# Patient Record
Sex: FEMALE | Race: WHITE | NOT HISPANIC OR LATINO | Employment: OTHER | URBAN - METROPOLITAN AREA
[De-identification: names, ages, dates, MRNs, and addresses within clinical notes are randomized per-mention and may not be internally consistent; named-entity substitution may affect disease eponyms.]

---

## 2018-04-18 ENCOUNTER — APPOINTMENT (OUTPATIENT)
Dept: GENERAL RADIOLOGY | Facility: HOSPITAL | Age: 83
End: 2018-04-18

## 2018-04-18 ENCOUNTER — HOSPITAL ENCOUNTER (EMERGENCY)
Facility: HOSPITAL | Age: 83
Discharge: HOME | End: 2018-04-18
Attending: FAMILY MEDICINE | Admitting: FAMILY MEDICINE

## 2018-04-18 VITALS
BODY MASS INDEX: 23.19 KG/M2 | SYSTOLIC BLOOD PRESSURE: 165 MMHG | OXYGEN SATURATION: 94 % | TEMPERATURE: 98.9 F | HEART RATE: 77 BPM | WEIGHT: 126 LBS | RESPIRATION RATE: 18 BRPM | DIASTOLIC BLOOD PRESSURE: 87 MMHG | HEIGHT: 62 IN

## 2018-04-18 DIAGNOSIS — F33.9 RECURRENT MAJOR DEPRESSIVE DISORDER, REMISSION STATUS UNSPECIFIED (HCC): Primary | ICD-10-CM

## 2018-04-18 LAB
ALBUMIN SERPL-MCNC: 4.2 G/DL (ref 3.5–5.2)
ALBUMIN/GLOB SERPL: 1.2 G/DL
ALP SERPL-CCNC: 88 U/L (ref 39–117)
ALT SERPL W P-5'-P-CCNC: 18 U/L (ref 1–33)
ANION GAP SERPL CALCULATED.3IONS-SCNC: 13.4 MMOL/L
AST SERPL-CCNC: 21 U/L (ref 1–32)
BACTERIA UR QL AUTO: ABNORMAL /HPF
BASOPHILS # BLD AUTO: 0.03 10*3/MM3 (ref 0–0.2)
BASOPHILS NFR BLD AUTO: 0.3 % (ref 0–1.5)
BILIRUB SERPL-MCNC: 0.3 MG/DL (ref 0.1–1.2)
BILIRUB UR QL STRIP: NEGATIVE
BUN BLD-MCNC: 15 MG/DL (ref 8–23)
BUN/CREAT SERPL: 13.8 (ref 7–25)
CALCIUM SPEC-SCNC: 10.2 MG/DL (ref 8.6–10.5)
CHLORIDE SERPL-SCNC: 97 MMOL/L (ref 98–107)
CLARITY UR: CLEAR
CO2 SERPL-SCNC: 26.6 MMOL/L (ref 22–29)
COLOR UR: YELLOW
CREAT BLD-MCNC: 1.09 MG/DL (ref 0.57–1)
DEPRECATED RDW RBC AUTO: 43.7 FL (ref 37–54)
EOSINOPHIL # BLD AUTO: 0.11 10*3/MM3 (ref 0–0.7)
EOSINOPHIL NFR BLD AUTO: 1.1 % (ref 0.3–6.2)
ERYTHROCYTE [DISTWIDTH] IN BLOOD BY AUTOMATED COUNT: 13.5 % (ref 11.7–13)
GFR SERPL CREATININE-BSD FRML MDRD: 48 ML/MIN/1.73
GLOBULIN UR ELPH-MCNC: 3.6 GM/DL
GLUCOSE BLD-MCNC: 165 MG/DL (ref 65–99)
GLUCOSE UR STRIP-MCNC: NEGATIVE MG/DL
HCT VFR BLD AUTO: 41 % (ref 35.6–45.5)
HGB BLD-MCNC: 13.7 G/DL (ref 11.9–15.5)
HGB UR QL STRIP.AUTO: NEGATIVE
HOLD SPECIMEN: NORMAL
HOLD SPECIMEN: NORMAL
HYALINE CASTS UR QL AUTO: ABNORMAL /LPF
IMM GRANULOCYTES # BLD: 0.03 10*3/MM3 (ref 0–0.03)
IMM GRANULOCYTES NFR BLD: 0.3 % (ref 0–0.5)
KETONES UR QL STRIP: NEGATIVE
LEUKOCYTE ESTERASE UR QL STRIP.AUTO: NEGATIVE
LYMPHOCYTES # BLD AUTO: 1.76 10*3/MM3 (ref 0.9–4.8)
LYMPHOCYTES NFR BLD AUTO: 17.5 % (ref 19.6–45.3)
MAGNESIUM SERPL-MCNC: 1.8 MG/DL (ref 1.6–2.4)
MCH RBC QN AUTO: 29.5 PG (ref 26.9–32)
MCHC RBC AUTO-ENTMCNC: 33.4 G/DL (ref 32.4–36.3)
MCV RBC AUTO: 88.4 FL (ref 80.5–98.2)
MONOCYTES # BLD AUTO: 0.7 10*3/MM3 (ref 0.2–1.2)
MONOCYTES NFR BLD AUTO: 7 % (ref 5–12)
NEUTROPHILS # BLD AUTO: 7.44 10*3/MM3 (ref 1.9–8.1)
NEUTROPHILS NFR BLD AUTO: 73.8 % (ref 42.7–76)
NITRITE UR QL STRIP: NEGATIVE
PH UR STRIP.AUTO: 6 [PH] (ref 5–8)
PLATELET # BLD AUTO: 465 10*3/MM3 (ref 140–500)
PMV BLD AUTO: 9.8 FL (ref 6–12)
POTASSIUM BLD-SCNC: 3.2 MMOL/L (ref 3.5–5.2)
PROT SERPL-MCNC: 7.8 G/DL (ref 6–8.5)
PROT UR QL STRIP: ABNORMAL
RBC # BLD AUTO: 4.64 10*6/MM3 (ref 3.9–5.2)
RBC # UR: ABNORMAL /HPF
REF LAB TEST METHOD: ABNORMAL
SODIUM BLD-SCNC: 137 MMOL/L (ref 136–145)
SP GR UR STRIP: 1.02 (ref 1–1.03)
SQUAMOUS #/AREA URNS HPF: ABNORMAL /HPF
TROPONIN T SERPL-MCNC: <0.01 NG/ML (ref 0–0.03)
UROBILINOGEN UR QL STRIP: ABNORMAL
WBC NRBC COR # BLD: 10.07 10*3/MM3 (ref 4.5–10.7)
WBC UR QL AUTO: ABNORMAL /HPF
WHOLE BLOOD HOLD SPECIMEN: NORMAL
WHOLE BLOOD HOLD SPECIMEN: NORMAL

## 2018-04-18 PROCEDURE — 90791 PSYCH DIAGNOSTIC EVALUATION: CPT | Performed by: SOCIAL WORKER

## 2018-04-18 PROCEDURE — 81001 URINALYSIS AUTO W/SCOPE: CPT

## 2018-04-18 PROCEDURE — 93005 ELECTROCARDIOGRAM TRACING: CPT

## 2018-04-18 PROCEDURE — 84484 ASSAY OF TROPONIN QUANT: CPT

## 2018-04-18 PROCEDURE — 36415 COLL VENOUS BLD VENIPUNCTURE: CPT

## 2018-04-18 PROCEDURE — 93010 ELECTROCARDIOGRAM REPORT: CPT | Performed by: INTERNAL MEDICINE

## 2018-04-18 PROCEDURE — 71045 X-RAY EXAM CHEST 1 VIEW: CPT

## 2018-04-18 PROCEDURE — 80053 COMPREHEN METABOLIC PANEL: CPT

## 2018-04-18 PROCEDURE — 99283 EMERGENCY DEPT VISIT LOW MDM: CPT

## 2018-04-18 PROCEDURE — 85025 COMPLETE CBC W/AUTO DIFF WBC: CPT

## 2018-04-18 PROCEDURE — 83735 ASSAY OF MAGNESIUM: CPT

## 2018-04-18 PROCEDURE — 93005 ELECTROCARDIOGRAM TRACING: CPT | Performed by: FAMILY MEDICINE

## 2018-04-18 RX ORDER — SODIUM CHLORIDE 0.9 % (FLUSH) 0.9 %
10 SYRINGE (ML) INJECTION AS NEEDED
Status: DISCONTINUED | OUTPATIENT
Start: 2018-04-18 | End: 2018-04-18 | Stop reason: HOSPADM
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
## 2018-04-18 NOTE — ED PROVIDER NOTES
Pt presents to the ED with complaints of generalized weakness for the past two weeks. Pt states a Hx anxiety and depression.     Physical Exam:  Lungs: CTAB  Cardiovascular: RRR    EKG           EKG time: 1328  Rhythm/Rate: sinus rhythm 79  P waves and CA: normal   QRS, axis: normal   ST and T waves: nonspecific ST changes      Interpreted Contemporaneously by me, independently viewed  unchanged compared to prior 1/26/04    Plan to have ACCESS see the pt.     MD ATTESTATION NOTE    The KENYATTA and I have discussed this patient's history, physical exam, and treatment plan.  I have reviewed the documentation and personally had a face to face interaction with the patient. I affirm the documentation and agree with the treatment and plan.  The attached note describes my personal findings.    Documentation assistance provided by clementina Keys for Dr Almaraz.  Information recorded by the scribe was done at my direction and has been verified and validated by me.              Aki Keys  04/18/18 1548       Fernando Almaraz MD  04/18/18 3444

## 2018-04-18 NOTE — ED TRIAGE NOTES
"Patient states \"For years I've been dealing with increased weakness. I can't cook for myself or take care of myself, I had to move in with my daughter.\"  "

## 2018-04-18 NOTE — ED PROVIDER NOTES
EMERGENCY DEPARTMENT ENCOUNTER    Room Number:  42/42  Date seen:  4/18/2018  Time seen: 1:59 PM  PCP: Madelaine Parisi MD   Psychiatry: Dr. Franco    HPI:  Chief complaint:Weakness  Context:Ivett Lacy is a 86 y.o. female who presents to the ED with c/o increased weakness as being unable to get around on her own for the past two weeks. Patient has been living with her daughter who helps the patient with her daily activities. Patient has had a decrease in her appetite and increased depression. Patient is on depression medication with no relief to her sx. Patient has not been admitted to a psychiatric facility.Patient denies SI, HI, hallucinations, or any other sx.     Timing:constant  Duration: two weeks  Location:General  Radiation:none  Quality:weakness  Intensity/Severity:moderate  Associated Symptoms:depression, decrease in appetite  Aggravating Factors:not stated  Alleviating Factors:not stated  Previous Episodes:h/o depression  Treatment before arrival:none    ALLERGIES  Review of patient's allergies indicates no known allergies.    PAST MEDICAL HISTORY  Active Ambulatory Problems     Diagnosis Date Noted   • No Active Ambulatory Problems     Resolved Ambulatory Problems     Diagnosis Date Noted   • No Resolved Ambulatory Problems     Past Medical History:   Diagnosis Date   • Depression    • Diabetes mellitus    • Disease of thyroid gland    • Hypertension        PAST SURGICAL HISTORY  Past Surgical History:   Procedure Laterality Date   • GALLBLADDER SURGERY     • HYSTERECTOMY         FAMILY HISTORY  Family History   Problem Relation Age of Onset   • Heart disease Mother    • Diabetes Mother    • Emphysema Father    • Heart disease Father    • Cancer Brother    • Diabetes Brother    • Heart disease Brother    • Colon polyps Son        SOCIAL HISTORY  Social History     Social History   • Marital status:      Spouse name: N/A   • Number of children: N/A   • Years of education: N/A     Occupational  History   • Not on file.     Social History Main Topics   • Smoking status: Never Smoker   • Smokeless tobacco: Never Used   • Alcohol use No   • Drug use: No   • Sexual activity: Defer     Other Topics Concern   • Not on file     Social History Narrative   • No narrative on file       REVIEW OF SYSTEMS  Review of Systems   Constitutional: Negative for activity change, appetite change, diaphoresis and fever.   HENT: Negative for trouble swallowing.    Eyes: Negative for visual disturbance.   Respiratory: Negative for cough, chest tightness, shortness of breath and wheezing.    Cardiovascular: Negative for chest pain, palpitations and leg swelling.   Gastrointestinal: Negative for abdominal pain, diarrhea, nausea and vomiting.   Genitourinary: Negative for dysuria.   Musculoskeletal: Negative for back pain.   Skin: Negative for rash.   Neurological: Positive for weakness. Negative for dizziness, speech difficulty and light-headedness.   Psychiatric/Behavioral:        Positive for depression       PHYSICAL EXAM  ED Triage Vitals   Temp Heart Rate Resp BP SpO2   04/18/18 1205 04/18/18 1154 04/18/18 1154 04/18/18 1203 04/18/18 1154   98.9 °F (37.2 °C) 84 18 170/84 98 %      Temp src Heart Rate Source Patient Position BP Location FiO2 (%)   04/18/18 1205 04/18/18 1334 04/18/18 1334 -- --   Oral Monitor Lying       Physical Exam   Constitutional: She is oriented to person, place, and time and well-developed, well-nourished, and in no distress. No distress.   HENT:   Head: Normocephalic and atraumatic.   Mouth/Throat: Uvula is midline and mucous membranes are normal.   Neck: Normal range of motion. Neck supple.   Cardiovascular: S1 normal, S2 normal and normal heart sounds.  Exam reveals no gallop and no friction rub.    No murmur heard.  Pulmonary/Chest: Effort normal and breath sounds normal. She has no decreased breath sounds. She has no wheezes. She has no rhonchi. She has no rales.   Abdominal: Soft. Normal  appearance. There is no rebound and no guarding.   Musculoskeletal: Normal range of motion.   Neurological: She is alert and oriented to person, place, and time.   Skin: Skin is warm, dry and intact.   Psychiatric: Affect and judgment normal.   Nursing note and vitals reviewed.      LAB RESULTS  Recent Results (from the past 24 hour(s))   Comprehensive Metabolic Panel    Collection Time: 04/18/18 12:19 PM   Result Value Ref Range    Glucose 165 (H) 65 - 99 mg/dL    BUN 15 8 - 23 mg/dL    Creatinine 1.09 (H) 0.57 - 1.00 mg/dL    Sodium 137 136 - 145 mmol/L    Potassium 3.2 (L) 3.5 - 5.2 mmol/L    Chloride 97 (L) 98 - 107 mmol/L    CO2 26.6 22.0 - 29.0 mmol/L    Calcium 10.2 8.6 - 10.5 mg/dL    Total Protein 7.8 6.0 - 8.5 g/dL    Albumin 4.20 3.50 - 5.20 g/dL    ALT (SGPT) 18 1 - 33 U/L    AST (SGOT) 21 1 - 32 U/L    Alkaline Phosphatase 88 39 - 117 U/L    Total Bilirubin 0.3 0.1 - 1.2 mg/dL    eGFR Non African Amer 48 (L) >60 mL/min/1.73    Globulin 3.6 gm/dL    A/G Ratio 1.2 g/dL    BUN/Creatinine Ratio 13.8 7.0 - 25.0    Anion Gap 13.4 mmol/L   Troponin    Collection Time: 04/18/18 12:19 PM   Result Value Ref Range    Troponin T <0.010 0.000 - 0.030 ng/mL   Magnesium    Collection Time: 04/18/18 12:19 PM   Result Value Ref Range    Magnesium 1.8 1.6 - 2.4 mg/dL   Light Blue Top    Collection Time: 04/18/18 12:19 PM   Result Value Ref Range    Extra Tube hold for add-on    Green Top (Gel)    Collection Time: 04/18/18 12:19 PM   Result Value Ref Range    Extra Tube Hold for add-ons.    Lavender Top    Collection Time: 04/18/18 12:19 PM   Result Value Ref Range    Extra Tube hold for add-on    Gold Top - SST    Collection Time: 04/18/18 12:19 PM   Result Value Ref Range    Extra Tube Hold for add-ons.    CBC Auto Differential    Collection Time: 04/18/18 12:19 PM   Result Value Ref Range    WBC 10.07 4.50 - 10.70 10*3/mm3    RBC 4.64 3.90 - 5.20 10*6/mm3    Hemoglobin 13.7 11.9 - 15.5 g/dL    Hematocrit 41.0 35.6 -  45.5 %    MCV 88.4 80.5 - 98.2 fL    MCH 29.5 26.9 - 32.0 pg    MCHC 33.4 32.4 - 36.3 g/dL    RDW 13.5 (H) 11.7 - 13.0 %    RDW-SD 43.7 37.0 - 54.0 fl    MPV 9.8 6.0 - 12.0 fL    Platelets 465 140 - 500 10*3/mm3    Neutrophil % 73.8 42.7 - 76.0 %    Lymphocyte % 17.5 (L) 19.6 - 45.3 %    Monocyte % 7.0 5.0 - 12.0 %    Eosinophil % 1.1 0.3 - 6.2 %    Basophil % 0.3 0.0 - 1.5 %    Immature Grans % 0.3 0.0 - 0.5 %    Neutrophils, Absolute 7.44 1.90 - 8.10 10*3/mm3    Lymphocytes, Absolute 1.76 0.90 - 4.80 10*3/mm3    Monocytes, Absolute 0.70 0.20 - 1.20 10*3/mm3    Eosinophils, Absolute 0.11 0.00 - 0.70 10*3/mm3    Basophils, Absolute 0.03 0.00 - 0.20 10*3/mm3    Immature Grans, Absolute 0.03 0.00 - 0.03 10*3/mm3   Urinalysis With / Culture If Indicated - Urine, Clean Catch    Collection Time: 04/18/18 12:20 PM   Result Value Ref Range    Color, UA Yellow Yellow, Straw    Appearance, UA Clear Clear    pH, UA 6.0 5.0 - 8.0    Specific Gravity, UA 1.016 1.005 - 1.030    Glucose, UA Negative Negative    Ketones, UA Negative Negative    Bilirubin, UA Negative Negative    Blood, UA Negative Negative    Protein, UA >=300 mg/dL (3+) (A) Negative    Leuk Esterase, UA Negative Negative    Nitrite, UA Negative Negative    Urobilinogen, UA 0.2 E.U./dL 0.2 - 1.0 E.U./dL   Urinalysis, Microscopic Only - Urine, Clean Catch    Collection Time: 04/18/18 12:20 PM   Result Value Ref Range    RBC, UA None Seen None Seen, 0-2 /HPF    WBC, UA 0-2 None Seen, 0-2 /HPF    Bacteria, UA None Seen None Seen /HPF    Squamous Epithelial Cells, UA 3-6 (A) None Seen, 0-2 /HPF    Hyaline Casts, UA 7-12 None Seen /LPF    Methodology Manual Light Microscopy        I ordered the above labs and reviewed the results    RADIOLOGY  XR Chest 1 View   Final Result   IMPRESSIONS: No evidence of acute disease within the chest.     This report was finalized on 4/18/2018 12:37 PM by Dr. Orville Allan MD.        I ordered the above noted radiological studies and  reviewed the images on the PACS system.    MEDICATIONS GIVEN IN ER  Medications   sodium chloride 0.9 % flush 10 mL (not administered)       EKG  Interpreted by ED Physician    PROCEDURES  Procedures    COURSE & MEDICAL DECISION MAKING  Pertinent Labs and Imaging studies that were ordered and reviewed are noted above.  Results were reviewed/discussed with the patient and they were also made aware of online assess.  Pt also made aware that some labs, such as cultures, will not be resulted during ER visit and follow up with PMD is necessary.     PROGRESS AND CONSULTS    Progress Notes:    ED Course     1400: I discussed lab results, EKG, and Chest Xray with the patient and need for ACCESS evaluation. Pt understands and agrees with the plan, all questions answered.    1505 Reviewed pt's history and workup with Dr. Fernando Almaraz.  After a bedside evaluation; Dr Almaraz agrees with the plan of care    1705: Access evaluated the patient who states that the patient and the is family agreeable to partial hospitalization services. Resources given by the ACCESS team.    The patient's history, physical exam, and lab findings were discussed with the physician, who also performed a face to face history and physical exam.  I discussed all results and noted any abnormalities with patient.  Discussed absoute need to recheck abnormalities with their family physician.  I answered any of the patient's questions.  Discussed plan for discharge, as there is no emergent indication for admission.  Pt is agreeable and understands need for follow up and repeat testing.  Pt is aware that discharge does not mean that nothing is wrong but it indicates no emergency is present and they must continue care with their family physician.  Pt is discharged with instructions to follow up with primary care doctor to have their blood pressure rechecked.     Disposition vitals:  /87   Pulse 77   Temp 98.9 °F (37.2 °C) (Oral)   Resp 18   Ht 157.5  "cm (62\")   Wt 57.2 kg (126 lb)   SpO2 94%   BMI 23.05 kg/m²       DIAGNOSIS  Final diagnoses:   Recurrent major depressive disorder, remission status unspecified       FOLLOW UP   Madelaine Parisi MD  [address]  [id number]  [place] [address] [phone]    Call       Please follow up with [facility] program            RX     Medication List      No changes were made to your prescriptions during this visit.       Documentation assistance provided by clementina Harris for CHRISTINA Pitts.  Information recorded by the clementina was done at my direction and has been verified and validated by me.  Electronically signed by Samara Harris on 4/18/2018 at time 5:11 PM       Samara Harris  04/18/18 1715       CHRISTINA Sotelo  04/18/18 1938    "
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
## 2018-05-02 ENCOUNTER — APPOINTMENT (OUTPATIENT)
Dept: CT IMAGING | Facility: HOSPITAL | Age: 83
End: 2018-05-02

## 2018-05-02 ENCOUNTER — HOSPITAL ENCOUNTER (INPATIENT)
Facility: HOSPITAL | Age: 83
LOS: 7 days | End: 2018-05-09
Attending: EMERGENCY MEDICINE | Admitting: HOSPITALIST

## 2018-05-02 ENCOUNTER — APPOINTMENT (OUTPATIENT)
Dept: GENERAL RADIOLOGY | Facility: HOSPITAL | Age: 83
End: 2018-05-02

## 2018-05-02 DIAGNOSIS — R26.89 DECREASED MOBILITY: ICD-10-CM

## 2018-05-02 DIAGNOSIS — N17.9 ACUTE RENAL FAILURE, UNSPECIFIED ACUTE RENAL FAILURE TYPE (HCC): Primary | ICD-10-CM

## 2018-05-02 DIAGNOSIS — G93.41 ACUTE METABOLIC ENCEPHALOPATHY: ICD-10-CM

## 2018-05-02 LAB
ALBUMIN SERPL-MCNC: 4.4 G/DL (ref 3.5–5.2)
ALBUMIN/GLOB SERPL: 1 G/DL
ALP SERPL-CCNC: 98 U/L (ref 39–117)
ALT SERPL W P-5'-P-CCNC: 16 U/L (ref 1–33)
AMORPH URATE CRY URNS QL MICRO: ABNORMAL /HPF
ANION GAP SERPL CALCULATED.3IONS-SCNC: 17.8 MMOL/L
AST SERPL-CCNC: 26 U/L (ref 1–32)
BACTERIA UR QL AUTO: ABNORMAL /HPF
BASOPHILS # BLD AUTO: 0.02 10*3/MM3 (ref 0–0.2)
BASOPHILS NFR BLD AUTO: 0.2 % (ref 0–1.5)
BILIRUB SERPL-MCNC: 0.5 MG/DL (ref 0.1–1.2)
BILIRUB UR QL STRIP: NEGATIVE
BUN BLD-MCNC: 69 MG/DL (ref 8–23)
BUN/CREAT SERPL: 40.4 (ref 7–25)
CALCIUM SPEC-SCNC: 10.5 MG/DL (ref 8.6–10.5)
CHLORIDE SERPL-SCNC: 95 MMOL/L (ref 98–107)
CLARITY UR: CLEAR
CO2 SERPL-SCNC: 23.2 MMOL/L (ref 22–29)
COLOR UR: YELLOW
CREAT BLD-MCNC: 1.71 MG/DL (ref 0.57–1)
D-LACTATE SERPL-SCNC: 1.6 MMOL/L (ref 0.5–2)
DEPRECATED RDW RBC AUTO: 44.5 FL (ref 37–54)
EOSINOPHIL # BLD AUTO: 0.09 10*3/MM3 (ref 0–0.7)
EOSINOPHIL NFR BLD AUTO: 0.8 % (ref 0.3–6.2)
ERYTHROCYTE [DISTWIDTH] IN BLOOD BY AUTOMATED COUNT: 13.7 % (ref 11.7–13)
GFR SERPL CREATININE-BSD FRML MDRD: 28 ML/MIN/1.73
GLOBULIN UR ELPH-MCNC: 4.2 GM/DL
GLUCOSE BLD-MCNC: 97 MG/DL (ref 65–99)
GLUCOSE UR STRIP-MCNC: NEGATIVE MG/DL
HCT VFR BLD AUTO: 42.2 % (ref 35.6–45.5)
HGB BLD-MCNC: 13.6 G/DL (ref 11.9–15.5)
HGB UR QL STRIP.AUTO: NEGATIVE
HOLD SPECIMEN: NORMAL
HOLD SPECIMEN: NORMAL
HYALINE CASTS UR QL AUTO: ABNORMAL /LPF
IMM GRANULOCYTES # BLD: 0.05 10*3/MM3 (ref 0–0.03)
IMM GRANULOCYTES NFR BLD: 0.4 % (ref 0–0.5)
KETONES UR QL STRIP: ABNORMAL
LEUKOCYTE ESTERASE UR QL STRIP.AUTO: NEGATIVE
LYMPHOCYTES # BLD AUTO: 1.73 10*3/MM3 (ref 0.9–4.8)
LYMPHOCYTES NFR BLD AUTO: 15.3 % (ref 19.6–45.3)
MAGNESIUM SERPL-MCNC: 2.5 MG/DL (ref 1.6–2.4)
MCH RBC QN AUTO: 28.6 PG (ref 26.9–32)
MCHC RBC AUTO-ENTMCNC: 32.2 G/DL (ref 32.4–36.3)
MCV RBC AUTO: 88.8 FL (ref 80.5–98.2)
MONOCYTES # BLD AUTO: 0.93 10*3/MM3 (ref 0.2–1.2)
MONOCYTES NFR BLD AUTO: 8.2 % (ref 5–12)
NEUTROPHILS # BLD AUTO: 8.51 10*3/MM3 (ref 1.9–8.1)
NEUTROPHILS NFR BLD AUTO: 75.1 % (ref 42.7–76)
NITRITE UR QL STRIP: NEGATIVE
NT-PROBNP SERPL-MCNC: 185.7 PG/ML (ref 0–1800)
PH UR STRIP.AUTO: <=5 [PH] (ref 5–8)
PLATELET # BLD AUTO: 564 10*3/MM3 (ref 140–500)
PMV BLD AUTO: 10 FL (ref 6–12)
POTASSIUM BLD-SCNC: 4 MMOL/L (ref 3.5–5.2)
PROCALCITONIN SERPL-MCNC: 0.05 NG/ML (ref 0.1–0.25)
PROT SERPL-MCNC: 8.6 G/DL (ref 6–8.5)
PROT UR QL STRIP: ABNORMAL
RBC # BLD AUTO: 4.75 10*6/MM3 (ref 3.9–5.2)
RBC # UR: ABNORMAL /HPF
REF LAB TEST METHOD: ABNORMAL
SODIUM BLD-SCNC: 136 MMOL/L (ref 136–145)
SP GR UR STRIP: 1.02 (ref 1–1.03)
SQUAMOUS #/AREA URNS HPF: ABNORMAL /HPF
T4 FREE SERPL-MCNC: 2.13 NG/DL (ref 0.93–1.7)
TROPONIN T SERPL-MCNC: <0.01 NG/ML (ref 0–0.03)
TSH SERPL DL<=0.05 MIU/L-ACNC: 0.71 MIU/ML (ref 0.27–4.2)
UROBILINOGEN UR QL STRIP: ABNORMAL
WBC NRBC COR # BLD: 11.33 10*3/MM3 (ref 4.5–10.7)
WBC UR QL AUTO: ABNORMAL /HPF
WHOLE BLOOD HOLD SPECIMEN: NORMAL
WHOLE BLOOD HOLD SPECIMEN: NORMAL

## 2018-05-02 PROCEDURE — 80053 COMPREHEN METABOLIC PANEL: CPT | Performed by: EMERGENCY MEDICINE

## 2018-05-02 PROCEDURE — 99285 EMERGENCY DEPT VISIT HI MDM: CPT

## 2018-05-02 PROCEDURE — 93005 ELECTROCARDIOGRAM TRACING: CPT | Performed by: EMERGENCY MEDICINE

## 2018-05-02 PROCEDURE — 93010 ELECTROCARDIOGRAM REPORT: CPT | Performed by: INTERNAL MEDICINE

## 2018-05-02 PROCEDURE — 70450 CT HEAD/BRAIN W/O DYE: CPT

## 2018-05-02 PROCEDURE — 84443 ASSAY THYROID STIM HORMONE: CPT | Performed by: EMERGENCY MEDICINE

## 2018-05-02 PROCEDURE — 81001 URINALYSIS AUTO W/SCOPE: CPT | Performed by: EMERGENCY MEDICINE

## 2018-05-02 PROCEDURE — 84145 PROCALCITONIN (PCT): CPT | Performed by: EMERGENCY MEDICINE

## 2018-05-02 PROCEDURE — 84484 ASSAY OF TROPONIN QUANT: CPT | Performed by: EMERGENCY MEDICINE

## 2018-05-02 PROCEDURE — 85025 COMPLETE CBC W/AUTO DIFF WBC: CPT | Performed by: EMERGENCY MEDICINE

## 2018-05-02 PROCEDURE — 87040 BLOOD CULTURE FOR BACTERIA: CPT | Performed by: EMERGENCY MEDICINE

## 2018-05-02 PROCEDURE — 36415 COLL VENOUS BLD VENIPUNCTURE: CPT

## 2018-05-02 PROCEDURE — 83735 ASSAY OF MAGNESIUM: CPT | Performed by: EMERGENCY MEDICINE

## 2018-05-02 PROCEDURE — 83605 ASSAY OF LACTIC ACID: CPT | Performed by: EMERGENCY MEDICINE

## 2018-05-02 PROCEDURE — 84439 ASSAY OF FREE THYROXINE: CPT | Performed by: EMERGENCY MEDICINE

## 2018-05-02 PROCEDURE — 83880 ASSAY OF NATRIURETIC PEPTIDE: CPT | Performed by: EMERGENCY MEDICINE

## 2018-05-02 PROCEDURE — 71045 X-RAY EXAM CHEST 1 VIEW: CPT

## 2018-05-02 RX ORDER — OMEPRAZOLE 20 MG/1
20 CAPSULE, DELAYED RELEASE ORAL DAILY
COMMUNITY

## 2018-05-02 RX ORDER — SODIUM CHLORIDE AND POTASSIUM CHLORIDE 150; 900 MG/100ML; MG/100ML
50 INJECTION, SOLUTION INTRAVENOUS CONTINUOUS
Status: DISCONTINUED | OUTPATIENT
Start: 2018-05-03 | End: 2018-05-06

## 2018-05-02 RX ORDER — LOPERAMIDE HYDROCHLORIDE 2 MG/1
2 CAPSULE ORAL 4 TIMES DAILY PRN
COMMUNITY
End: 2018-05-09 | Stop reason: HOSPADM

## 2018-05-02 RX ORDER — CALCIUM CARBONATE 200(500)MG
1 TABLET,CHEWABLE ORAL DAILY
COMMUNITY
End: 2018-05-09 | Stop reason: HOSPADM

## 2018-05-02 RX ORDER — TERAZOSIN 2 MG/1
2 CAPSULE ORAL 2 TIMES DAILY
COMMUNITY
End: 2018-05-09 | Stop reason: HOSPADM

## 2018-05-02 RX ORDER — ARIPIPRAZOLE 5 MG/1
5 TABLET ORAL NIGHTLY
COMMUNITY
End: 2018-05-29 | Stop reason: HOSPADM

## 2018-05-02 RX ORDER — SODIUM CHLORIDE 9 MG/ML
125 INJECTION, SOLUTION INTRAVENOUS CONTINUOUS
Status: DISCONTINUED | OUTPATIENT
Start: 2018-05-02 | End: 2018-05-03

## 2018-05-02 RX ORDER — ACETAMINOPHEN 325 MG/1
650 TABLET ORAL EVERY 6 HOURS PRN
COMMUNITY
End: 2018-05-09 | Stop reason: HOSPADM

## 2018-05-02 RX ORDER — PRAZOSIN HYDROCHLORIDE 2 MG/1
2 CAPSULE ORAL NIGHTLY
COMMUNITY
End: 2018-05-09 | Stop reason: HOSPADM

## 2018-05-02 RX ORDER — SODIUM CHLORIDE 0.9 % (FLUSH) 0.9 %
10 SYRINGE (ML) INJECTION AS NEEDED
Status: DISCONTINUED | OUTPATIENT
Start: 2018-05-02 | End: 2018-05-09

## 2018-05-02 RX ADMIN — SODIUM CHLORIDE 125 ML/HR: 9 INJECTION, SOLUTION INTRAVENOUS at 20:30

## 2018-05-02 RX ADMIN — SODIUM CHLORIDE 500 ML: 9 INJECTION, SOLUTION INTRAVENOUS at 19:42
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
## 2018-05-02 NOTE — ED NOTES
Decreased level of activity, not answering questions, not taking medications, not eating, patient normally A&)x4 and doing all of these things. Patient was last seen doing these things two days ago       Rebeca Campbell RN  05/02/18 3754

## 2018-05-03 ENCOUNTER — APPOINTMENT (OUTPATIENT)
Dept: CARDIOLOGY | Facility: HOSPITAL | Age: 83
End: 2018-05-03
Attending: HOSPITALIST

## 2018-05-03 ENCOUNTER — APPOINTMENT (OUTPATIENT)
Dept: ULTRASOUND IMAGING | Facility: HOSPITAL | Age: 83
End: 2018-05-03

## 2018-05-03 LAB
ANION GAP SERPL CALCULATED.3IONS-SCNC: 18.1 MMOL/L
AORTIC ARCH: 2.14 CM
AORTIC DIMENSIONLESS INDEX: 0.5 (DI)
ASCENDING AORTA: 3.1 CM
BACTERIA UR QL AUTO: NORMAL /HPF
BASOPHILS # BLD AUTO: 0.03 10*3/MM3 (ref 0–0.2)
BASOPHILS NFR BLD AUTO: 0.2 % (ref 0–1.5)
BH CV ECHO MEAS - ACS: 1.8 CM
BH CV ECHO MEAS - AI DEC SLOPE: 303 CM/SEC^2
BH CV ECHO MEAS - AI MAX PG: 56.6 MMHG
BH CV ECHO MEAS - AI MAX VEL: 376 CM/SEC
BH CV ECHO MEAS - AI P1/2T: 363.5 MSEC
BH CV ECHO MEAS - AO MAX PG (FULL): 10.5 MMHG
BH CV ECHO MEAS - AO MAX PG: 15.8 MMHG
BH CV ECHO MEAS - AO MEAN PG (FULL): 7 MMHG
BH CV ECHO MEAS - AO MEAN PG: 10 MMHG
BH CV ECHO MEAS - AO ROOT AREA (BSA CORRECTED): 1.8
BH CV ECHO MEAS - AO ROOT AREA: 6.2 CM^2
BH CV ECHO MEAS - AO ROOT DIAM: 2.8 CM
BH CV ECHO MEAS - AO V2 MAX: 199 CM/SEC
BH CV ECHO MEAS - AO V2 MEAN: 145 CM/SEC
BH CV ECHO MEAS - AO V2 VTI: 39.6 CM
BH CV ECHO MEAS - ASC AORTA: 3.1 CM
BH CV ECHO MEAS - AVA(I,A): 1.3 CM^2
BH CV ECHO MEAS - AVA(I,D): 1.3 CM^2
BH CV ECHO MEAS - AVA(V,A): 1.5 CM^2
BH CV ECHO MEAS - AVA(V,D): 1.5 CM^2
BH CV ECHO MEAS - BSA(HAYCOCK): 1.6 M^2
BH CV ECHO MEAS - BSA: 1.6 M^2
BH CV ECHO MEAS - BZI_BMI: 22.5 KILOGRAMS/M^2
BH CV ECHO MEAS - BZI_METRIC_HEIGHT: 157.5 CM
BH CV ECHO MEAS - BZI_METRIC_WEIGHT: 55.8 KG
BH CV ECHO MEAS - CONTRAST EF (2CH): 52.3 ML/M^2
BH CV ECHO MEAS - CONTRAST EF 4CH: 54.2 ML/M^2
BH CV ECHO MEAS - EDV(CUBED): 50.7 ML
BH CV ECHO MEAS - EDV(MOD-SP2): 44 ML
BH CV ECHO MEAS - EDV(MOD-SP4): 48 ML
BH CV ECHO MEAS - EDV(TEICH): 58.1 ML
BH CV ECHO MEAS - EF(CUBED): 72.7 %
BH CV ECHO MEAS - EF(MOD-BP): 54 %
BH CV ECHO MEAS - EF(MOD-SP2): 52.3 %
BH CV ECHO MEAS - EF(MOD-SP4): 54.2 %
BH CV ECHO MEAS - EF(TEICH): 65.3 %
BH CV ECHO MEAS - ESV(CUBED): 13.8 ML
BH CV ECHO MEAS - ESV(MOD-SP2): 21 ML
BH CV ECHO MEAS - ESV(MOD-SP4): 22 ML
BH CV ECHO MEAS - ESV(TEICH): 20.2 ML
BH CV ECHO MEAS - FS: 35.1 %
BH CV ECHO MEAS - IVS/LVPW: 1
BH CV ECHO MEAS - IVSD: 1 CM
BH CV ECHO MEAS - LAT PEAK E' VEL: 6 CM/SEC
BH CV ECHO MEAS - LV DIASTOLIC VOL/BSA (35-75): 30.9 ML/M^2
BH CV ECHO MEAS - LV MASS(C)D: 112.5 GRAMS
BH CV ECHO MEAS - LV MASS(C)DI: 72.4 GRAMS/M^2
BH CV ECHO MEAS - LV MAX PG: 5.4 MMHG
BH CV ECHO MEAS - LV MEAN PG: 3 MMHG
BH CV ECHO MEAS - LV SYSTOLIC VOL/BSA (12-30): 14.2 ML/M^2
BH CV ECHO MEAS - LV V1 MAX: 116 CM/SEC
BH CV ECHO MEAS - LV V1 MEAN: 86 CM/SEC
BH CV ECHO MEAS - LV V1 VTI: 20.6 CM
BH CV ECHO MEAS - LVIDD: 3.7 CM
BH CV ECHO MEAS - LVIDS: 2.4 CM
BH CV ECHO MEAS - LVLD AP2: 6.1 CM
BH CV ECHO MEAS - LVLD AP4: 6.3 CM
BH CV ECHO MEAS - LVLS AP2: 5.6 CM
BH CV ECHO MEAS - LVLS AP4: 5.7 CM
BH CV ECHO MEAS - LVOT AREA (M): 2.5 CM^2
BH CV ECHO MEAS - LVOT AREA: 2.5 CM^2
BH CV ECHO MEAS - LVOT DIAM: 1.8 CM
BH CV ECHO MEAS - LVPWD: 1 CM
BH CV ECHO MEAS - MED PEAK E' VEL: 6 CM/SEC
BH CV ECHO MEAS - MV A DUR: 0.11 SEC
BH CV ECHO MEAS - MV A MAX VEL: 124 CM/SEC
BH CV ECHO MEAS - MV DEC SLOPE: 591 CM/SEC^2
BH CV ECHO MEAS - MV DEC TIME: 0.18 SEC
BH CV ECHO MEAS - MV E MAX VEL: 80.8 CM/SEC
BH CV ECHO MEAS - MV E/A: 0.65
BH CV ECHO MEAS - MV MAX PG: 6.7 MMHG
BH CV ECHO MEAS - MV MEAN PG: 4 MMHG
BH CV ECHO MEAS - MV P1/2T MAX VEL: 96.4 CM/SEC
BH CV ECHO MEAS - MV P1/2T: 47.8 MSEC
BH CV ECHO MEAS - MV V2 MAX: 129 CM/SEC
BH CV ECHO MEAS - MV V2 MEAN: 88.3 CM/SEC
BH CV ECHO MEAS - MV V2 VTI: 29.2 CM
BH CV ECHO MEAS - MVA P1/2T LCG: 2.3 CM^2
BH CV ECHO MEAS - MVA(P1/2T): 4.6 CM^2
BH CV ECHO MEAS - MVA(VTI): 1.8 CM^2
BH CV ECHO MEAS - PA ACC TIME: 0.09 SEC
BH CV ECHO MEAS - PA MAX PG (FULL): 1.9 MMHG
BH CV ECHO MEAS - PA MAX PG: 3.9 MMHG
BH CV ECHO MEAS - PA PR(ACCEL): 40.8 MMHG
BH CV ECHO MEAS - PA V2 MAX: 99.1 CM/SEC
BH CV ECHO MEAS - PULM A REVS DUR: 0.11 SEC
BH CV ECHO MEAS - PULM A REVS VEL: 37.4 CM/SEC
BH CV ECHO MEAS - PULM DIAS VEL: 41.9 CM/SEC
BH CV ECHO MEAS - PULM S/D: 1.8
BH CV ECHO MEAS - PULM SYS VEL: 77.1 CM/SEC
BH CV ECHO MEAS - PVA(V,A): 2.3 CM^2
BH CV ECHO MEAS - PVA(V,D): 2.3 CM^2
BH CV ECHO MEAS - QP/QS: 1
BH CV ECHO MEAS - RAP SYSTOLE: 3 MMHG
BH CV ECHO MEAS - RV MAX PG: 2.1 MMHG
BH CV ECHO MEAS - RV MEAN PG: 1 MMHG
BH CV ECHO MEAS - RV V1 MAX: 71.7 CM/SEC
BH CV ECHO MEAS - RV V1 MEAN: 50.3 CM/SEC
BH CV ECHO MEAS - RV V1 VTI: 17.5 CM
BH CV ECHO MEAS - RVOT AREA: 3.1 CM^2
BH CV ECHO MEAS - RVOT DIAM: 2 CM
BH CV ECHO MEAS - RVSP: 14 MMHG
BH CV ECHO MEAS - SI(AO): 156.8 ML/M^2
BH CV ECHO MEAS - SI(CUBED): 23.7 ML/M^2
BH CV ECHO MEAS - SI(LVOT): 33.7 ML/M^2
BH CV ECHO MEAS - SI(MOD-SP2): 14.8 ML/M^2
BH CV ECHO MEAS - SI(MOD-SP4): 16.7 ML/M^2
BH CV ECHO MEAS - SI(TEICH): 24.4 ML/M^2
BH CV ECHO MEAS - SUP REN AO DIAM: 1.9 CM
BH CV ECHO MEAS - SV(AO): 243.8 ML
BH CV ECHO MEAS - SV(CUBED): 36.8 ML
BH CV ECHO MEAS - SV(LVOT): 52.4 ML
BH CV ECHO MEAS - SV(MOD-SP2): 23 ML
BH CV ECHO MEAS - SV(MOD-SP4): 26 ML
BH CV ECHO MEAS - SV(RVOT): 55 ML
BH CV ECHO MEAS - SV(TEICH): 38 ML
BH CV ECHO MEAS - TAPSE (>1.6): 3.1 CM2
BH CV ECHO MEAS - TR MAX VEL: 167 CM/SEC
BH CV ECHO MEASUREMENTS AVERAGE E/E' RATIO: 13.47
BH CV VAS BP RIGHT ARM: NORMAL MMHG
BH CV XLRA - RV BASE: 3.07 CM
BH CV XLRA - TDI S': 18.9 CM/SEC
BILIRUB UR QL STRIP: NEGATIVE
BUN BLD-MCNC: 58 MG/DL (ref 8–23)
BUN/CREAT SERPL: 41.1 (ref 7–25)
CALCIUM SPEC-SCNC: 9.9 MG/DL (ref 8.6–10.5)
CHLORIDE SERPL-SCNC: 102 MMOL/L (ref 98–107)
CHLORIDE UR-SCNC: 139 MMOL/L
CK SERPL-CCNC: 159 U/L (ref 20–180)
CLARITY UR: CLEAR
CO2 SERPL-SCNC: 19.9 MMOL/L (ref 22–29)
COLOR UR: YELLOW
CREAT BLD-MCNC: 1.41 MG/DL (ref 0.57–1)
CREAT UR-MCNC: 33.7 MG/DL
DEPRECATED RDW RBC AUTO: 45.3 FL (ref 37–54)
EOSINOPHIL # BLD AUTO: 0.09 10*3/MM3 (ref 0–0.7)
EOSINOPHIL NFR BLD AUTO: 0.7 % (ref 0.3–6.2)
EOSINOPHIL SPEC QL MICRO: 0 % EOS/100 CELLS (ref 0–0)
ERYTHROCYTE [DISTWIDTH] IN BLOOD BY AUTOMATED COUNT: 13.6 % (ref 11.7–13)
GFR SERPL CREATININE-BSD FRML MDRD: 35 ML/MIN/1.73
GLUCOSE BLD-MCNC: 74 MG/DL (ref 65–99)
GLUCOSE BLDC GLUCOMTR-MCNC: 78 MG/DL (ref 70–130)
GLUCOSE BLDC GLUCOMTR-MCNC: 80 MG/DL (ref 70–130)
GLUCOSE UR STRIP-MCNC: NEGATIVE MG/DL
HCT VFR BLD AUTO: 42.3 % (ref 35.6–45.5)
HGB BLD-MCNC: 13.5 G/DL (ref 11.9–15.5)
HGB UR QL STRIP.AUTO: NEGATIVE
HYALINE CASTS UR QL AUTO: NORMAL /LPF
IMM GRANULOCYTES # BLD: 0.05 10*3/MM3 (ref 0–0.03)
IMM GRANULOCYTES NFR BLD: 0.4 % (ref 0–0.5)
KETONES UR QL STRIP: ABNORMAL
LEFT ATRIUM VOLUME INDEX: 20 ML/M2
LEUKOCYTE ESTERASE UR QL STRIP.AUTO: NEGATIVE
LYMPHOCYTES # BLD AUTO: 1.42 10*3/MM3 (ref 0.9–4.8)
LYMPHOCYTES NFR BLD AUTO: 11.1 % (ref 19.6–45.3)
MAXIMAL PREDICTED HEART RATE: 134 BPM
MCH RBC QN AUTO: 28.7 PG (ref 26.9–32)
MCHC RBC AUTO-ENTMCNC: 31.9 G/DL (ref 32.4–36.3)
MCV RBC AUTO: 90 FL (ref 80.5–98.2)
MONOCYTES # BLD AUTO: 1.08 10*3/MM3 (ref 0.2–1.2)
MONOCYTES NFR BLD AUTO: 8.5 % (ref 5–12)
NEUTROPHILS # BLD AUTO: 10.07 10*3/MM3 (ref 1.9–8.1)
NEUTROPHILS NFR BLD AUTO: 79.1 % (ref 42.7–76)
NITRITE UR QL STRIP: NEGATIVE
PH UR STRIP.AUTO: 5.5 [PH] (ref 5–8)
PLATELET # BLD AUTO: 517 10*3/MM3 (ref 140–500)
PMV BLD AUTO: 9.8 FL (ref 6–12)
POTASSIUM BLD-SCNC: 4.2 MMOL/L (ref 3.5–5.2)
PROT UR QL STRIP: ABNORMAL
PROT UR-MCNC: 67 MG/DL
RBC # BLD AUTO: 4.7 10*6/MM3 (ref 3.9–5.2)
RBC # UR: NORMAL /HPF
REF LAB TEST METHOD: NORMAL
SODIUM BLD-SCNC: 140 MMOL/L (ref 136–145)
SODIUM UR-SCNC: 147 MMOL/L
SP GR UR STRIP: 1.01 (ref 1–1.03)
SQUAMOUS #/AREA URNS HPF: NORMAL /HPF
STRESS TARGET HR: 114 BPM
T3FREE SERPL-MCNC: 1.77 PG/ML (ref 2–4.4)
T4 FREE SERPL-MCNC: 1.93 NG/DL (ref 0.93–1.7)
T4 SERPL-MCNC: 11.22 MCG/DL (ref 4.5–11.7)
UROBILINOGEN UR QL STRIP: ABNORMAL
WBC NRBC COR # BLD: 12.74 10*3/MM3 (ref 4.5–10.7)
WBC UR QL AUTO: NORMAL /HPF

## 2018-05-03 PROCEDURE — 82962 GLUCOSE BLOOD TEST: CPT

## 2018-05-03 PROCEDURE — 82550 ASSAY OF CK (CPK): CPT | Performed by: INTERNAL MEDICINE

## 2018-05-03 PROCEDURE — 25810000003 SODIUM CHLORIDE 0.9 % WITH KCL 20 MEQ 20-0.9 MEQ/L-% SOLUTION: Performed by: HOSPITALIST

## 2018-05-03 PROCEDURE — 84439 ASSAY OF FREE THYROXINE: CPT | Performed by: HOSPITALIST

## 2018-05-03 PROCEDURE — 93306 TTE W/DOPPLER COMPLETE: CPT

## 2018-05-03 PROCEDURE — 82570 ASSAY OF URINE CREATININE: CPT | Performed by: INTERNAL MEDICINE

## 2018-05-03 PROCEDURE — 84481 FREE ASSAY (FT-3): CPT | Performed by: HOSPITALIST

## 2018-05-03 PROCEDURE — 81001 URINALYSIS AUTO W/SCOPE: CPT | Performed by: INTERNAL MEDICINE

## 2018-05-03 PROCEDURE — 80048 BASIC METABOLIC PNL TOTAL CA: CPT | Performed by: HOSPITALIST

## 2018-05-03 PROCEDURE — 84300 ASSAY OF URINE SODIUM: CPT | Performed by: INTERNAL MEDICINE

## 2018-05-03 PROCEDURE — 76775 US EXAM ABDO BACK WALL LIM: CPT

## 2018-05-03 PROCEDURE — 82436 ASSAY OF URINE CHLORIDE: CPT | Performed by: INTERNAL MEDICINE

## 2018-05-03 PROCEDURE — 84156 ASSAY OF PROTEIN URINE: CPT | Performed by: INTERNAL MEDICINE

## 2018-05-03 PROCEDURE — 87205 SMEAR GRAM STAIN: CPT | Performed by: INTERNAL MEDICINE

## 2018-05-03 PROCEDURE — 85025 COMPLETE CBC W/AUTO DIFF WBC: CPT | Performed by: HOSPITALIST

## 2018-05-03 PROCEDURE — 93306 TTE W/DOPPLER COMPLETE: CPT | Performed by: INTERNAL MEDICINE

## 2018-05-03 RX ORDER — ATORVASTATIN CALCIUM 20 MG/1
20 TABLET, FILM COATED ORAL DAILY
Status: DISCONTINUED | OUTPATIENT
Start: 2018-05-03 | End: 2018-05-03

## 2018-05-03 RX ORDER — LATANOPROST 50 UG/ML
1 SOLUTION/ DROPS OPHTHALMIC NIGHTLY
Status: DISCONTINUED | OUTPATIENT
Start: 2018-05-03 | End: 2018-05-09 | Stop reason: HOSPADM

## 2018-05-03 RX ORDER — LEVOTHYROXINE SODIUM 0.03 MG/1
25 TABLET ORAL EVERY MORNING
Status: DISCONTINUED | OUTPATIENT
Start: 2018-05-03 | End: 2018-05-09 | Stop reason: HOSPADM

## 2018-05-03 RX ORDER — PANTOPRAZOLE SODIUM 40 MG/1
40 TABLET, DELAYED RELEASE ORAL EVERY MORNING
Status: DISCONTINUED | OUTPATIENT
Start: 2018-05-03 | End: 2018-05-09 | Stop reason: HOSPADM

## 2018-05-03 RX ORDER — TIMOLOL MALEATE 5 MG/ML
1 SOLUTION/ DROPS OPHTHALMIC DAILY
Status: DISCONTINUED | OUTPATIENT
Start: 2018-05-03 | End: 2018-05-09 | Stop reason: HOSPADM

## 2018-05-03 RX ORDER — VENLAFAXINE 75 MG/1
75 TABLET ORAL DAILY
Status: DISCONTINUED | OUTPATIENT
Start: 2018-05-03 | End: 2018-05-09 | Stop reason: HOSPADM

## 2018-05-03 RX ORDER — AMLODIPINE BESYLATE 5 MG/1
5 TABLET ORAL DAILY
Status: DISCONTINUED | OUTPATIENT
Start: 2018-05-03 | End: 2018-05-03

## 2018-05-03 RX ORDER — ASPIRIN 81 MG/1
81 TABLET, CHEWABLE ORAL DAILY
Status: DISCONTINUED | OUTPATIENT
Start: 2018-05-03 | End: 2018-05-09 | Stop reason: HOSPADM

## 2018-05-03 RX ORDER — VENLAFAXINE 75 MG/1
75 TABLET ORAL 2 TIMES DAILY WITH MEALS
Status: DISCONTINUED | OUTPATIENT
Start: 2018-05-03 | End: 2018-05-03

## 2018-05-03 RX ORDER — ARIPIPRAZOLE 5 MG/1
5 TABLET ORAL NIGHTLY
Status: DISCONTINUED | OUTPATIENT
Start: 2018-05-03 | End: 2018-05-06

## 2018-05-03 RX ORDER — DIPHENOXYLATE HYDROCHLORIDE AND ATROPINE SULFATE 2.5; .025 MG/1; MG/1
1 TABLET ORAL DAILY
Status: DISCONTINUED | OUTPATIENT
Start: 2018-05-03 | End: 2018-05-09 | Stop reason: HOSPADM

## 2018-05-03 RX ORDER — AMLODIPINE BESYLATE 10 MG/1
10 TABLET ORAL DAILY
Status: DISCONTINUED | OUTPATIENT
Start: 2018-05-03 | End: 2018-05-09 | Stop reason: HOSPADM

## 2018-05-03 RX ORDER — ATORVASTATIN CALCIUM 10 MG/1
10 TABLET, FILM COATED ORAL DAILY
Status: DISCONTINUED | OUTPATIENT
Start: 2018-05-03 | End: 2018-05-09 | Stop reason: HOSPADM

## 2018-05-03 RX ORDER — LEVOTHYROXINE SODIUM 88 UG/1
88 TABLET ORAL DAILY
Status: DISCONTINUED | OUTPATIENT
Start: 2018-05-03 | End: 2018-05-03

## 2018-05-03 RX ORDER — CALCIUM CARBONATE 200(500)MG
1 TABLET,CHEWABLE ORAL DAILY
Status: DISCONTINUED | OUTPATIENT
Start: 2018-05-03 | End: 2018-05-03

## 2018-05-03 RX ADMIN — LATANOPROST 1 DROP: 50 SOLUTION OPHTHALMIC at 20:31

## 2018-05-03 RX ADMIN — POTASSIUM CHLORIDE AND SODIUM CHLORIDE 125 ML/HR: 900; 150 INJECTION, SOLUTION INTRAVENOUS at 01:36

## 2018-05-03 RX ADMIN — POTASSIUM CHLORIDE AND SODIUM CHLORIDE 125 ML/HR: 900; 150 INJECTION, SOLUTION INTRAVENOUS at 17:07

## 2018-05-03 RX ADMIN — POTASSIUM CHLORIDE AND SODIUM CHLORIDE 125 ML/HR: 900; 150 INJECTION, SOLUTION INTRAVENOUS at 09:28

## 2018-05-03 NOTE — PROGRESS NOTES
Discharge Planning Assessment   [facility]     Patient Name: Ivett Lacy  MRN: [id number]  Today's Date: 5/3/2018    Admit Date: 5/2/2018          Discharge Needs Assessment     Row Name 05/03/18 0922       Living Environment    Lives With alone    Current Living Arrangements home/apartment/condo    Potentially Unsafe Housing Conditions other (see comments)   no concerns     Primary Care Provided by self    Provides Primary Care For no one    Family Caregiver if Needed child(yelitza), adult    Family Caregiver Names David Jenaro/Jordan Eason     Quality of Family Relationships involved;helpful;supportive    Able to Return to Prior Arrangements yes       Resource/Environmental Concerns    Resource/Environmental Concerns none    Transportation Concerns car, none       Transition Planning    Patient/Family Anticipates Transition to other (see comments)   undetermined     Patient/Family Anticipated Services at Transition none    Transportation Anticipated family or friend will provide       Discharge Needs Assessment    Readmission Within the Last 30 Days no previous admission in last 30 days    Concerns to be Addressed adjustment to diagnosis/illness;cognitive/perceptual;care coordination/care conferences    Equipment Currently Used at Home none    Anticipated Changes Related to Illness inability to care for self            Discharge Plan     Row Name 05/03/18 0923       Plan    Plan Follow for hospital course     Patient/Family in Agreement with Plan yes    Plan Comments CCP met [facility] patient's son, Jordan Eason ([phone]) patient confused/non verbal at this time. CCP role explained and discharge planning discussed. Face sheet verified and IMM noted. Patient has living will on file. Lonny Hernandez is POA (no document on file). Patient lives by herself and prior to hospitalization; patient was independent with her ADLS and alert/oriented. Patient did  not use any medical equipment.  Patient arrived from the [facility]. Per  son; patient was at the [facility] for mediication management. Son is unsure of d/c needs at this time. CCP will follow for hospital course and pending treatments. Vandana CHAVEZ         Destination     No service coordination in this encounter.      Durable Medical Equipment     No service coordination in this encounter.      Dialysis/Infusion     No service coordination in this encounter.      Home Medical Care     No service coordination in this encounter.      Social Care     No service coordination in this encounter.                Demographic Summary     Row Name 05/03/18 0922       General Information    Admission Type inpatient    Arrived From emergency department    Required Notices Provided Important Message from Medicare    Referral Source admission list    Reason for Consult discharge planning            Functional Status     Row Name 05/03/18 0922       Functional Status    Usual Activity Tolerance good    Current Activity Tolerance poor       Functional Status, IADL    Medications independent    Meal Preparation independent    Housekeeping independent    Laundry independent    Shopping independent       Mental Status    General Appearance WDL ex       Mental Status Summary    Recent Changes in Mental Status/Cognitive Functioning unable to assess;memory (recent);mental status            Psychosocial    No documentation.           Abuse/Neglect    No documentation.           Legal    No documentation.           Substance Abuse    No documentation.           Patient Forms    No documentation.         SCOTT Robbins
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
## 2018-05-03 NOTE — CONSULTS
"Adult Nutrition  Assessment/PES    Patient Name:  Ivett Lacy  YOB: 1932  MRN: [id number]  Admit Date:  5/2/2018    Assessment Date:  5/3/2018    Comments:  Consult d/t MST score of 3 per nurse admission screen, bandar score < 13.  Possible weight loss and decreased appetite per nurse admission screen.  No family in room at time of visit and patient not responding to questions when asked.    Will continue to follow.          Adult Nutrition Assessment     Row Name 05/03/18 1318       Nutrition Prescription PO    Current PO Diet Regular       PO Evaluation    Number of Days PO Intake Evaluated Insufficient Data    Row Name 05/03/18 1317       Anthropometrics    Height 157.5 cm (62\")    Weight 55.8 kg (123 lb)       Ideal Body Weight (IBW)    Ideal Body Weight (IBW) (kg) 50.43    % Ideal Body Weight 110.63       Body Mass Index (BMI)    BMI (kg/m2) 22.54       IBW Adjustment, Para/Tetraplegia    5% Adjustment, Para (IBW) 47.91    10% Adjustment, Para (IBW) 45.39    10% Adjustment, Tetra (IBW) 45.39    15% Adjustment, Tetra (IBW) 42.87    Row Name 05/03/18 1316       Calculation Measurements    Weight Used For Calculations 55.9 kg (123 lb 3.8 oz)       Estimated/Assessed Needs    Additional Documentation KCAL/KG (Group);Protein Requirements (Group);Fluid Requirements (Group)       KCAL/KG    14 Kcal/Kg (kcal) 782.6    15 Kcal/Kg (kcal) 838.5    18 Kcal/Kg (kcal) 1006.2    20 Kcal/Kg (kcal) 1118    25 Kcal/Kg (kcal) 1397.5    30 Kcal/Kg (kcal) 1677    35 Kcal/Kg (kcal) 1956.5    40 Kcal/Kg (kcal) 2236    45 Kcal/Kg (kcal) 2515.5    50 Kcal/Kg (kcal) 2795    kcal/kg (Specify) --   5582-9283       [place]-St. Jeor Equation    RMR ([place]-St. Jeor Equation) 952.25       Protein Requirements    Est Protein Requirement Amount (gms/kg) 1.0 gm protein    Estimated Protein Requirements (gms/day) 55.9       Fluid Requirements    Estimated Fluid Requirements (mL/day) 1677    Estimated Fluid Requirement Method RDA " Method    RDA Method (mL) 5623    Moisés-Ender Method (over 20 kg) 2618    Row Name 05/03/18 1315       Labs/Procedures/Meds    Lab Results Reviewed reviewed, pertinent    Lab Results Comments creat 1.41, BUN 58, GFR 35; insulin, synthroid, MVI, protonix        Physical Findings    Overall Physical Appearance other (see comments)   nonverbal, just continues to blink rapidly when attempting to talk to her    Skin --   B=11, intact    Row Name 05/03/18 1314       Admit Weight    Admit Weight 55.9 kg (123 lb 3.8 oz)       Body Mass Index (BMI)    BMI Assessment BMI 18.5-24.9: normal    Row Name 05/03/18 1211       Reason for Assessment    Reason For Assessment nurse/nurse practitioner consult;identified at risk by screening criteria    Diagnosis psychosocial;diabetes diagnosis/complications;endocrine conditions;cardiac disease;renal disease   ARF    Identified At Risk by Screening Criteria unintentional loss of 10 lbs or more in the past 2 mos;reduced oral intake over the last month       Nutrition/Diet History    Typical Food/Fluid Intake decreased teddy and possible weight loss per nurse admission screen, no family at bedside, patient not responding to questions - just blinks rapidly          Problem/Interventions:        Problem 1     Row Name 05/03/18 1318       Nutrition Diagnoses Problem 1    Problem 1 Predicted Suboptimal Intake    Etiology (related to) Medical Diagnosis    Neurological AMS    Signs/Symptoms (evidenced by) Report/Observation                    Intervention Goal     Row Name 05/03/18 1318       Intervention Goal    General Maintain nutrition;Disease management/therapy;Reduce/improve symptoms;Meet nutritional needs for age/condition    PO Establish PO;Tolerate PO;PO intake (%)    PO Intake % 80 %    Weight Maintain weight            Nutrition Intervention     Row Name 05/03/18 1319       Nutrition Intervention    RD/Tech Action Follow Tx progress;Care plan reviewd              Education/Evaluation      Row Name 05/03/18 1319       Education    Education Will Instruct as appropriate       Monitor/Evaluation    Monitor Per protocol;PO intake;Pertinent labs;Weight;Skin status;Symptoms    Education Follow-up Reinforce PRN        Electronically signed by:  Lois Gonzalez RD  05/03/18 1:19 PM

## 2018-05-03 NOTE — PLAN OF CARE
Problem: Patient Care Overview  Goal: Plan of Care Review  Outcome: Ongoing (interventions implemented as appropriate)   05/03/18 1811   Coping/Psychosocial   Plan of Care Reviewed With patient;family   Plan of Care Review   Progress no change   OTHER   Outcome Summary Pt continues to be nonverbal. Pt does shake her head yes and no at times. Celis removed today and pure wick placed. Pt tolerating well. VSS. No insulin coverage needed. Pt not eating or drinking despite multiple attempts. Pt remains on IVF. ECHO and US of kidneys completed. Will continue to monitor.        Problem: Skin Injury Risk (Adult)  Goal: Identify Related Risk Factors and Signs and Symptoms  Outcome: Ongoing (interventions implemented as appropriate)   05/03/18 1811   Skin Injury Risk (Adult)   Related Risk Factors (Skin Injury Risk) advanced age;cognitive impairment;mobility impaired     Goal: Skin Health and Integrity  Outcome: Ongoing (interventions implemented as appropriate)   05/03/18 1811   Skin Injury Risk (Adult)   Skin Health and Integrity making progress toward outcome       Problem: Confusion, Acute (Adult)  Goal: Identify Related Risk Factors and Signs and Symptoms  Outcome: Ongoing (interventions implemented as appropriate)   05/03/18 1811   Confusion, Acute (Adult)   Related Risk Factors (Acute Confusion) cognitive impairment;malnutrition;dehydration;metabolic abnormalities   Signs and Symptoms (Acute Confusion) acute onset of symptoms;disorientation;emotional/behavioral disturbances;memory disturbed;perceptions disturbed;reasoning/planning disturbed;thought process diminished/disorganized;understanding disturbed     Goal: Cognitive/Functional Impairments Minimized  Outcome: Ongoing (interventions implemented as appropriate)   05/03/18 1811   Confusion, Acute (Adult)   Cognitive/Functional Impairments Minimized making progress toward outcome     Goal: Safety  Outcome: Ongoing (interventions implemented as appropriate)   05/03/18  1811   Confusion, Acute (Adult)   Safety making progress toward outcome       Problem: Renal Failure/Kidney Injury, Acute (Adult)  Goal: Signs and Symptoms of Listed Potential Problems Will be Absent, Minimized or Managed (Renal Failure/Kidney Injury, Acute)  Outcome: Ongoing (interventions implemented as appropriate)   05/03/18 1811   Goal/Outcome Evaluation   Problems Assessed (Acute Renal Failure/Kidney Injury) all   Problems Present (ARF/Kidney Injury) electrolyte imbalance       Problem: Fall Risk (Adult)  Goal: Identify Related Risk Factors and Signs and Symptoms  Outcome: Ongoing (interventions implemented as appropriate)   05/03/18 1811   Fall Risk (Adult)   Related Risk Factors (Fall Risk) age-related changes;confusion/agitation;environment unfamiliar   Signs and Symptoms (Fall Risk) presence of risk factors     Goal: Absence of Fall  Outcome: Ongoing (interventions implemented as appropriate)   05/03/18 1811   Fall Risk (Adult)   Absence of Fall making progress toward outcome
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
## 2018-05-03 NOTE — NURSING NOTE
Upon admission, patient unable to follow commands or respond verbally. Patient's son (POA) and daughter-in-law at bedside and able to answer some questions.     [facility] called at this time to fax over medication list.     JONI Briceno

## 2018-05-03 NOTE — PLAN OF CARE
Problem: Patient Care Overview  Goal: Plan of Care Review  Outcome: Ongoing (interventions implemented as appropriate)   05/03/18 0351   Coping/Psychosocial   Plan of Care Reviewed With patient   Plan of Care Review   Progress no change   OTHER   Outcome Summary Pt admittded from [facility] after several days of increased confusion, decreaed activity and appetite. Pt not talking or moving at this time. Pt admitted for acute renal fx and dehydration. Celis inserted 5/2 for accurate I&O. Nephrology consulted. Will continue to monitor.      Goal: Individualization and Mutuality  Outcome: Ongoing (interventions implemented as appropriate)    Goal: Discharge Needs Assessment  Outcome: Ongoing (interventions implemented as appropriate)    Goal: Interprofessional Rounds/Family Conf  Outcome: Ongoing (interventions implemented as appropriate)      Problem: Skin Injury Risk (Adult)  Goal: Identify Related Risk Factors and Signs and Symptoms  Outcome: Ongoing (interventions implemented as appropriate)    Goal: Skin Health and Integrity  Outcome: Ongoing (interventions implemented as appropriate)      Problem: Confusion, Acute (Adult)  Goal: Identify Related Risk Factors and Signs and Symptoms  Outcome: Ongoing (interventions implemented as appropriate)    Goal: Cognitive/Functional Impairments Minimized  Outcome: Ongoing (interventions implemented as appropriate)    Goal: Safety  Outcome: Ongoing (interventions implemented as appropriate)      Problem: Renal Failure/Kidney Injury, Acute (Adult)  Goal: Signs and Symptoms of Listed Potential Problems Will be Absent, Minimized or Managed (Renal Failure/Kidney Injury, Acute)  Outcome: Ongoing (interventions implemented as appropriate)

## 2018-05-03 NOTE — PROGRESS NOTES
Clinical Pharmacy Services: Medication History    Ivett Lacy is a 86 y.o. female presenting to [facility] for Acute renal failure, unspecified acute renal failure type [N17.9]  Acute metabolic encephalopathy [G93.41]    She  has a past medical history of Depression; Diabetes mellitus; Disease of thyroid gland; Glaucoma; Hyperlipidemia; Hypertension; Insomnia; and Mood disorder.    Allergies as of 05/02/2018 - Reviewed 05/02/2018   Allergen Reaction Noted   • Prozac [fluoxetine hcl] Unknown (See Comments) 05/02/2018     Medication information was obtained from: [facility] [phone] and    Prior to Admission Medications     Prescriptions Last Dose Informant Patient Reported? Taking?    alendronate (FOSAMAX) 70 MG tablet   Yes Yes    Take 70 mg by mouth every 7 days.    amLODIPine (NORVASC) 5 MG tablet 5/2/2018  Yes Yes    Take 5 mg by mouth daily.    ARIPiprazole (ABILIFY) 5 MG tablet 5/1/2018  Yes Yes    Take 5 mg by mouth Every Night.    atorvastatin (LIPITOR) 20 MG tablet 5/2/2018  Yes Yes    Take 20 mg by mouth daily.    Cholecalciferol (VITAMIN D3) 5000 UNITS capsule capsule   Yes Yes    Take 5,000 Units by mouth daily.    DICLOFENAC PO  Pharmacy Yes Yes    Take 50 mg by mouth 2 (Two) Times a Day. PRN    latanoprost (XALATAN) 0.005 % ophthalmic solution 5/1/2018 Nursing Home Yes Yes    Administer 1 drop to both eyes Every Night.    levothyroxine (SYNTHROID, LEVOTHROID) 88 MCG tablet 5/2/2018  Yes Yes    Take 88 mcg by mouth Every Morning.    lisinopril-hydrochlorothiazide (PRINZIDE,ZESTORETIC) 20-12.5 MG per tablet 5/2/2018 Nursing Home Yes Yes    Take 1 tablet by mouth 2 (Two) Times a Day.    loperamide (IMODIUM) 2 MG capsule 5/1/2018  Yes Yes    Take 2 mg by mouth 4 (Four) Times a Day As Needed for Diarrhea.    LORazepam (ATIVAN) 1 MG tablet Past Month  Yes Yes    Take 0.5 mg by mouth Every 8 (Eight) Hours As Needed for Anxiety.    metFORMIN (GLUCOPHAGE) 500 MG tablet 5/2/2018 Nursing  Home Yes Yes    Take 500 mg by mouth Daily With Breakfast.    omeprazole (priLOSEC) 20 MG capsule 5/2/2018  Yes Yes    Take 20 mg by mouth Daily.    terazosin (HYTRIN) 2 MG capsule 5/1/2018  Yes Yes    Take 2 mg by mouth 2 (Two) Times a Day.    timolol (TIMOPTIC) 0.5 % ophthalmic solution 5/2/2018  Yes Yes    1 drop 2 (two) times a day.    venlafaxine (EFFEXOR) 75 MG tablet 5/2/2018 Self Yes Yes    Take 75 mg by mouth Daily.    acetaminophen (TYLENOL) 325 MG tablet More than a month  Yes No    Take 650 mg by mouth Every 6 (Six) Hours As Needed for Mild Pain .    benzocaine-menthol (CEPACOL SORE THROAT) 15-2.6 MG lozenge lozenge Unknown  Yes No    Dissolve  in the mouth Every 2 (Two) Hours As Needed.    calcium carbonate (TUMS) 500 MG chewable tablet Unknown  Yes No    Chew 1 tablet Daily.    gabapentin (NEURONTIN) 100 MG capsule   Yes No    Take 100 mg by mouth 3 (three) times a day.    hydrocortisone (ANUSOL-HC) 2.5 % rectal cream Unknown  Yes No    Insert  into the rectum 2 (two) times a day.    hypromellose (ISOPTO TEARS) 0.5 % solution ophthalmic solution Unknown  Yes No    3 (three) times a day as needed.    ibuprofen (ADVIL,MOTRIN) 800 MG tablet Unknown  Yes No    Take 800 mg by mouth every 6 (six) hours as needed for mild pain (1-3).    magnesium hydroxide (MILK OF MAGNESIA) 2400 MG/10ML suspension suspension Unknown  Yes No    Take 10 mL by mouth Daily As Needed.    MULTIPLE VITAMIN PO Unknown Pharmacy Yes No    Take  by mouth Daily.    prazosin (MINIPRESS) 2 MG capsule Unknown Nursing Home Yes No    Take 2 mg by mouth Every Night.        Medication notes: Of note: [facility] didn't have recent records of the following medications:  Alendronate, Gabapentin, Hydrocortisone 2.5% rectal cream, PRN Ibuprofen 800mg, Terazosin 2mg BID    Additionally, Metformin is 500mg qday, Lisinopril/HCTZ 20/12.5mg is BID, Effexor is 75mg IR qday    Of note: both Prazosin and Terazosin are on the med rec from [facility].       This medication list is complete to the best of my knowledge as of 5/3/2018    Please call if questions.    Thanks, Oscar Ramos, KelliD, BCPS  5/3/2018 3:18 PM

## 2018-05-03 NOTE — SIGNIFICANT NOTE
05/03/18 1544   Rehab Time/Intention   Evaluation Not Performed other (see comments)  (Pt unable to be awoken. Does not respond to verbal and tactile stimuli. RN aware. Will follow up tomorrow. )   Rehab Treatment   Discipline physical therapist   Recommendation   PT - Next Appointment 05/04/18

## 2018-05-03 NOTE — SIGNIFICANT NOTE
05/03/18 1244   Rehab Time/Intention   Evaluation Not Performed patient unavailable for evaluation  (cardio 1244)   Rehab Treatment   Discipline occupational therapist

## 2018-05-04 LAB
ALBUMIN SERPL-MCNC: 3.8 G/DL (ref 3.5–5.2)
ALBUMIN/GLOB SERPL: 1.2 G/DL
ALP SERPL-CCNC: 88 U/L (ref 39–117)
ALT SERPL W P-5'-P-CCNC: 16 U/L (ref 1–33)
ANION GAP SERPL CALCULATED.3IONS-SCNC: 18.2 MMOL/L
AST SERPL-CCNC: 27 U/L (ref 1–32)
BASOPHILS # BLD AUTO: 0.02 10*3/MM3 (ref 0–0.2)
BASOPHILS NFR BLD AUTO: 0.2 % (ref 0–1.5)
BILIRUB SERPL-MCNC: 0.5 MG/DL (ref 0.1–1.2)
BUN BLD-MCNC: 29 MG/DL (ref 8–23)
BUN/CREAT SERPL: 33.7 (ref 7–25)
CALCIUM SPEC-SCNC: 9.2 MG/DL (ref 8.6–10.5)
CHLORIDE SERPL-SCNC: 106 MMOL/L (ref 98–107)
CHOLEST SERPL-MCNC: 91 MG/DL (ref 0–200)
CO2 SERPL-SCNC: 16.8 MMOL/L (ref 22–29)
CREAT BLD-MCNC: 0.86 MG/DL (ref 0.57–1)
DEPRECATED RDW RBC AUTO: 45.6 FL (ref 37–54)
EOSINOPHIL # BLD AUTO: 0.04 10*3/MM3 (ref 0–0.7)
EOSINOPHIL NFR BLD AUTO: 0.3 % (ref 0.3–6.2)
ERYTHROCYTE [DISTWIDTH] IN BLOOD BY AUTOMATED COUNT: 13.9 % (ref 11.7–13)
GFR SERPL CREATININE-BSD FRML MDRD: 63 ML/MIN/1.73
GLOBULIN UR ELPH-MCNC: 3.3 GM/DL
GLUCOSE BLD-MCNC: 87 MG/DL (ref 65–99)
GLUCOSE BLDC GLUCOMTR-MCNC: 110 MG/DL (ref 70–130)
GLUCOSE BLDC GLUCOMTR-MCNC: 130 MG/DL (ref 70–130)
GLUCOSE BLDC GLUCOMTR-MCNC: 85 MG/DL (ref 70–130)
GLUCOSE BLDC GLUCOMTR-MCNC: 94 MG/DL (ref 70–130)
HBA1C MFR BLD: 6.2 % (ref 4.8–5.6)
HCT VFR BLD AUTO: 38.7 % (ref 35.6–45.5)
HDLC SERPL-MCNC: 32 MG/DL (ref 40–60)
HGB BLD-MCNC: 12.8 G/DL (ref 11.9–15.5)
IMM GRANULOCYTES # BLD: 0.03 10*3/MM3 (ref 0–0.03)
IMM GRANULOCYTES NFR BLD: 0.3 % (ref 0–0.5)
LDLC SERPL CALC-MCNC: 40 MG/DL (ref 0–100)
LDLC/HDLC SERPL: 1.26 {RATIO}
LYMPHOCYTES # BLD AUTO: 1.35 10*3/MM3 (ref 0.9–4.8)
LYMPHOCYTES NFR BLD AUTO: 11.3 % (ref 19.6–45.3)
MCH RBC QN AUTO: 29.6 PG (ref 26.9–32)
MCHC RBC AUTO-ENTMCNC: 33.1 G/DL (ref 32.4–36.3)
MCV RBC AUTO: 89.4 FL (ref 80.5–98.2)
MONOCYTES # BLD AUTO: 0.87 10*3/MM3 (ref 0.2–1.2)
MONOCYTES NFR BLD AUTO: 7.3 % (ref 5–12)
NEUTROPHILS # BLD AUTO: 9.7 10*3/MM3 (ref 1.9–8.1)
NEUTROPHILS NFR BLD AUTO: 80.9 % (ref 42.7–76)
NT-PROBNP SERPL-MCNC: 514 PG/ML (ref 0–1800)
PHOSPHATE SERPL-MCNC: 1.9 MG/DL (ref 2.5–4.5)
PLATELET # BLD AUTO: 563 10*3/MM3 (ref 140–500)
PMV BLD AUTO: 10.1 FL (ref 6–12)
POTASSIUM BLD-SCNC: 4.4 MMOL/L (ref 3.5–5.2)
PROT SERPL-MCNC: 7.1 G/DL (ref 6–8.5)
RBC # BLD AUTO: 4.33 10*6/MM3 (ref 3.9–5.2)
SODIUM BLD-SCNC: 141 MMOL/L (ref 136–145)
TRIGL SERPL-MCNC: 93 MG/DL (ref 0–150)
TSH SERPL DL<=0.05 MIU/L-ACNC: 0.34 MIU/ML (ref 0.27–4.2)
URATE SERPL-MCNC: 8.3 MG/DL (ref 2.4–5.7)
VLDLC SERPL-MCNC: 18.6 MG/DL (ref 5–40)
WBC NRBC COR # BLD: 11.98 10*3/MM3 (ref 4.5–10.7)

## 2018-05-04 PROCEDURE — 84550 ASSAY OF BLOOD/URIC ACID: CPT | Performed by: INTERNAL MEDICINE

## 2018-05-04 PROCEDURE — 97165 OT EVAL LOW COMPLEX 30 MIN: CPT

## 2018-05-04 PROCEDURE — 83036 HEMOGLOBIN GLYCOSYLATED A1C: CPT | Performed by: HOSPITALIST

## 2018-05-04 PROCEDURE — 80053 COMPREHEN METABOLIC PANEL: CPT | Performed by: HOSPITALIST

## 2018-05-04 PROCEDURE — 92610 EVALUATE SWALLOWING FUNCTION: CPT | Performed by: SPEECH-LANGUAGE PATHOLOGIST

## 2018-05-04 PROCEDURE — 84443 ASSAY THYROID STIM HORMONE: CPT | Performed by: HOSPITALIST

## 2018-05-04 PROCEDURE — 80061 LIPID PANEL: CPT | Performed by: HOSPITALIST

## 2018-05-04 PROCEDURE — 83880 ASSAY OF NATRIURETIC PEPTIDE: CPT | Performed by: HOSPITALIST

## 2018-05-04 PROCEDURE — 97162 PT EVAL MOD COMPLEX 30 MIN: CPT

## 2018-05-04 PROCEDURE — 25810000003 SODIUM CHLORIDE 0.9 % WITH KCL 20 MEQ 20-0.9 MEQ/L-% SOLUTION: Performed by: HOSPITALIST

## 2018-05-04 PROCEDURE — 97110 THERAPEUTIC EXERCISES: CPT

## 2018-05-04 PROCEDURE — 82962 GLUCOSE BLOOD TEST: CPT

## 2018-05-04 PROCEDURE — 85025 COMPLETE CBC W/AUTO DIFF WBC: CPT | Performed by: HOSPITALIST

## 2018-05-04 PROCEDURE — 84100 ASSAY OF PHOSPHORUS: CPT | Performed by: INTERNAL MEDICINE

## 2018-05-04 RX ORDER — LISINOPRIL 10 MG/1
10 TABLET ORAL
Status: DISCONTINUED | OUTPATIENT
Start: 2018-05-04 | End: 2018-05-06

## 2018-05-04 RX ADMIN — POTASSIUM CHLORIDE AND SODIUM CHLORIDE 125 ML/HR: 900; 150 INJECTION, SOLUTION INTRAVENOUS at 09:29

## 2018-05-04 RX ADMIN — VENLAFAXINE HYDROCHLORIDE 75 MG: 75 TABLET ORAL at 09:28

## 2018-05-04 RX ADMIN — Medication 1 TABLET: at 09:28

## 2018-05-04 RX ADMIN — ATORVASTATIN CALCIUM 10 MG: 10 TABLET, FILM COATED ORAL at 09:28

## 2018-05-04 RX ADMIN — AMLODIPINE BESYLATE 10 MG: 10 TABLET ORAL at 09:28

## 2018-05-04 RX ADMIN — ASPIRIN 81 MG: 81 TABLET, CHEWABLE ORAL at 09:28

## 2018-05-04 RX ADMIN — LISINOPRIL 10 MG: 10 TABLET ORAL at 17:01

## 2018-05-04 RX ADMIN — PANTOPRAZOLE SODIUM 40 MG: 40 TABLET, DELAYED RELEASE ORAL at 09:29

## 2018-05-04 RX ADMIN — LEVOTHYROXINE SODIUM 25 MCG: 25 TABLET ORAL at 09:28

## 2018-05-04 RX ADMIN — TIMOLOL MALEATE 1 DROP: 5 SOLUTION/ DROPS OPHTHALMIC at 09:29

## 2018-05-04 RX ADMIN — POTASSIUM CHLORIDE AND SODIUM CHLORIDE 125 ML/HR: 900; 150 INJECTION, SOLUTION INTRAVENOUS at 00:31

## 2018-05-04 RX ADMIN — LATANOPROST 1 DROP: 50 SOLUTION OPHTHALMIC at 20:41

## 2018-05-04 NOTE — PLAN OF CARE
Problem: Patient Care Overview  Goal: Plan of Care Review   05/04/18 1016   Coping/Psychosocial   Plan of Care Reviewed With patient   OTHER   Outcome Summary Pt presents with acute renal failure, dehydration, metabolic encephalopathy. Upon exam, pt demonstrates impaired cognition, generalized weakness, impaired balance, and decreased endurance limiting mobility. Pt currently requiring min A for mobility; granddaughter states pt is very independent w/mobility at baseline. Pt would benefit from continued PT to address impairments and assist w/return to PLOF.

## 2018-05-04 NOTE — THERAPY EVALUATION
Acute Care - Speech Language Pathology   Swallow Initial Evaluation  [facility]     Patient Name: Ivett Lacy  : 1932  MRN: [id number]  Today's Date: 2018  Onset of Illness/Injury or Date of Surgery: 18     Referring Physician: Nimo      Admit Date: 2018    Visit Dx:     ICD-10-CM ICD-9-CM   1. Acute renal failure, unspecified acute renal failure type N17.9 584.9   2. Acute metabolic encephalopathy G93.41 348.31   3. Decreased mobility R26.89 781.99     Patient Active Problem List   Diagnosis   • Acute renal failure     Past Medical History:   Diagnosis Date   • Depression    • Diabetes mellitus     TYPE 2   • Disease of thyroid gland     HYPOTHYROID   • Glaucoma    • Hyperlipidemia    • Hypertension    • Insomnia    • Mood disorder      Past Surgical History:   Procedure Laterality Date   • GALLBLADDER SURGERY     • HYSTERECTOMY            SWALLOW EVALUATION (last 72 hours)      SLP Adult Swallow Evaluation     Row Name 18 1100                   Rehab Evaluation    Document Type evaluation  -KA        Subjective Information no complaints  -KA        Patient Observations alert;cooperative  -KA        Patient Effort good  -KA        Symptoms Noted During/After Treatment none  -KA           General Information    Patient Profile Reviewed yes  -KA        Pertinent History Of Current Problem Patient admitted with acute renal failure and metabolic encephalopathy. Family reports pt is not at baseline with signficiant decline in mental status. Pt previously at [facility], and family reports currrently pt is very paranoid. Pt Did did vocalize x2 to SLP and ate with grandaughter feeding her  -KA        Current Method of Nutrition NPO  -KA        Precautions/Limitations, Hearing WFL;for purposes of eval  -KA        Prior Level of Function-Communication cognitive-linguistic impairment  -KA        Prior Level of Function-Swallowing safe, efficient swallowing in all situations;no diet  consistency restrictions   per family  -KA        Plans/Goals Discussed with patient and family  -KA        Barriers to Rehab cognitive status  -KA        Patient's Goals for Discharge patient did not state  -KA        Family Goals for Discharge patient able to return to PO diet  -KA           Oral Motor and Function    Dentition Assessment natural, present and adequate  -KA        Secretion Management WNL/WFL  -KA           Oral Musculature and Cranial Nerve Assessment    Oral Motor General Assessment generalized oral motor weakness  -KA           General Eating/Swallowing Observations    Respiratory Support Currently in Use room air  -KA        Eating/Swallowing Skills fed by staff/caregiver  -KA        Positioning During Eating upright in bed  -KA        Utensils Used spoon;straw  -KA        Consistencies Trialed soft textures;thin liquids;pureed  -KA           Clinical Swallow Eval    Oral Prep Phase WFL  -KA        Oral Transit impaired  -KA        Oral Residue WFL  -KA        Pharyngeal Phase no overt signs/symptoms of pharyngeal impairment  -KA        Esophageal Phase unremarkable  -KA        Clinical Swallow Evaluation Summary No overt s/s of pen/asp with thins via straw, and pills via straw with thins, puree, and mechanical soft, slow mastication but functional and no oral residue  -KA           Oral Transit Concerns    Oral Transit Concerns increased oral transit time  -KA        Increased Oral Transit Time mechanical soft  -KA           Clinical Impression    SLP Swallowing Diagnosis mild;oral dysfunction;functional pharyngeal phase  -KA        Functional Impact no impact on function  -KA        Criteria for Skilled Therapeutic Interventions Met no problems identified which require skilled intervention  -KA           Recommendations    Therapy Frequency (Swallow) evaluation only  -KA        SLP Diet Recommendation soft textures;chopped;thin liquids  -KA        Recommended Precautions and Strategies  upright posture during/after eating;small bites of food and sips of liquid  -KA        SLP Rec. for Method of Medication Administration meds whole;with thin liquids  -KA        Monitor for Signs of Aspiration yes;notify SLP if any concerns;throat clearing;fever;upper respiratory;pneumonia;right lower lobe infiltrates  -KA        Anticipated Dischage Disposition unknown  -          User Key  (r) = Recorded By, (t) = Taken By, (c) = Cosigned By    Initials Name Effective Dates    MAHENDRA Adam MA,CCC-SLP 04/13/15 -         EDUCATION  The patient has been educated in the following areas:   Dysphagia (Swallowing Impairment).    SLP Recommendation and Plan  SLP Swallowing Diagnosis: mild, oral dysfunction, functional pharyngeal phase  SLP Diet Recommendation: soft textures, chopped, thin liquids  Recommended Precautions and Strategies: upright posture during/after eating, small bites of food and sips of liquid     Monitor for Signs of Aspiration: yes, notify SLP if any concerns, throat clearing, fever, upper respiratory, pneumonia, right lower lobe infiltrates     Criteria for Skilled Therapeutic Interventions Met: no problems identified which require skilled intervention  Anticipated Dischage Disposition: unknown     Therapy Frequency (Swallow): evaluation only          Plan of Care Reviewed With: patient, grandchild(yelitza)  Plan of Care Review  Plan of Care Reviewed With: patient, grandchild(yelitza)  Outcome Summary: SLP completed swallow eval, family present and granddaughter fed pt with no overt s/s  ofpen/asp with thins via straw, pills, puree and mechanical soft. Slightly slow mastication however functional recommend mechanical soft chopped meats and thin liquids           SLP Outcome Measures (last 72 hours)      SLP Outcome Measures     Row Name 05/04/18 1100             SLP Outcome Measures    Outcome Measure Used? Adult NOMS  -KA         FCM Scores    FCM Chosen Swallowing  -KA      Swallowing FCM Score 5  -KA         User Key  (r) = Recorded By, (t) = Taken By, (c) = Cosigned By    Initials Name Effective Dates    MAHENDRA Adam MA,CCC-SLP 04/13/15 -            Time Calculation:         Time Calculation- SLP     Row Name 05/04/18 1141             Time Calculation- SLP    SLP Start Time 1100  -KA      SLP Received On 05/04/18  -        User Key  (r) = Recorded By, (t) = Taken By, (c) = Cosigned By    Initials Name Provider Type    MAHENDRA Adam MA,CCC-SLP Speech and Language Pathologist          Therapy Charges for Today     Code Description Service Date Service Provider Modifiers Qty    77009708886 HC ST EVAL ORAL PHARYNG SWALLOW 3 5/4/2018 Leroy Adam MA,CCC-SLP GN 1               Leroy Adam MA,CCC-SLP  5/4/2018

## 2018-05-04 NOTE — PLAN OF CARE
Problem: Patient Care Overview  Goal: Plan of Care Review   05/04/18 1140   Coping/Psychosocial   Plan of Care Reviewed With patient;grandchild(yelitza)   OTHER   Outcome Summary SLP completed swallow eval, family present and granddaughter fed pt with no overt s/s ofpen/asp with thins via straw, pills, puree and mechanical soft. Slightly slow mastication however functional recommend mechanical soft chopped meats and thin liquids

## 2018-05-04 NOTE — THERAPY EVALUATION
Acute Care - Occupational Therapy Initial Evaluation   [facility]     Patient Name: Ivett Lacy  : 1932  MRN: [id number]  Today's Date: 2018  Onset of Illness/Injury or Date of Surgery: 18     Referring Physician: Nimo    Admit Date: 2018       ICD-10-CM ICD-9-CM   1. Acute renal failure, unspecified acute renal failure type N17.9 584.9   2. Acute metabolic encephalopathy G93.41 348.31   3. Decreased mobility R26.89 781.99     Patient Active Problem List   Diagnosis   • Acute renal failure     Past Medical History:   Diagnosis Date   • Depression    • Diabetes mellitus     TYPE 2   • Disease of thyroid gland     HYPOTHYROID   • Glaucoma    • Hyperlipidemia    • Hypertension    • Insomnia    • Mood disorder      Past Surgical History:   Procedure Laterality Date   • GALLBLADDER SURGERY     • HYSTERECTOMY            OT ASSESSMENT FLOWSHEET (last 72 hours)      Occupational Therapy Evaluation     Row Name 18 1350                   OT Evaluation Time/Intention    Document Type evaluation  -SG        Mode of Treatment occupational therapy  -SG           General Information    Patient Profile Reviewed? yes  -SG        General Observations of Patient pt sitting in bed, granddgtr getting ready to assist with breakfast  -SG        Prior Level of Function independent:;ADL's  -SG        Existing Precautions/Restrictions fall  -SG           Cognitive Assessment/Intervention- PT/OT    Orientation Status (Cognition) oriented to;person  -SG        Follows Commands (Cognition) follows one step commands;25-49% accuracy  -SG           ADL Assessment/Intervention    BADL Assessment/Intervention --   unable to assess due to confusion and about to eat breakfast  -SG           General ROM    GENERAL ROM COMMENTS UE ROM WFL AROM  -SG           Positioning and Restraints    Pre-Treatment Position in bed  -SG        Post Treatment Position bed  -SG        In Bed sitting;call light within reach;encouraged to  call for assist;exit alarm on;with family/caregiver  -          User Key  (r) = Recorded By, (t) = Taken By, (c) = Cosigned By    Initials Name Effective Dates     JESUS Estrada 03/07/18 -            Occupational Therapy Education     Title: PT OT SLP Therapies (Active)     Topic: Occupational Therapy (Active)     Point: ADL training (Active)     Description: Instruct learner(s) on proper safety adaptation and remediation techniques during self care or transfers.   Instruct in proper use of assistive devices.   Learning Progress Summary     Learner Status Readiness Method Response Comment Documented by    Patient Active Acceptance E NR   05/04/18 1354                      User Key     Initials Effective Dates Name Provider Type Discipline     03/07/18 -  JESUS Estrada Occupational Therapist OT                  OT Recommendation and Plan                Outcome Measures     Row Name 05/04/18 1354 05/04/18 1000          How much help from another person do you currently need...    Turning from your back to your side while in flat bed without using bedrails?  -- 3  -EE     Moving from lying on back to sitting on the side of a flat bed without bedrails?  -- 3  -EE     Moving to and from a bed to a chair (including a wheelchair)?  -- 3  -EE     Standing up from a chair using your arms (e.g., wheelchair, bedside chair)?  -- 3  -EE     Climbing 3-5 steps with a railing?  -- 2  -EE     To walk in hospital room?  -- 3  -EE     AM-PAC 6 Clicks Score  -- 17  -EE        How much help from another is currently needed...    Putting on and taking off regular lower body clothing? 1  -SG  --     Bathing (including washing, rinsing, and drying) 1  -SG  --     Toileting (which includes using toilet bed pan or urinal) 1  -SG  --     Putting on and taking off regular upper body clothing 1  -SG  --     Taking care of personal grooming (such as brushing teeth) 1  -SG  --     Eating meals 2  -SG  --     Score 7  -SG   --        Functional Assessment    Outcome Measure Options AM-PAC 6 Clicks Daily Activity (OT)  -SG AM-PAC 6 Clicks Basic Mobility (PT)  -EE       User Key  (r) = Recorded By, (t) = Taken By, (c) = Cosigned By    Initials Name Provider Type    MARY Payton, OTR Occupational Therapist    CHICA Bradley PT Physical Therapist          Time Calculation:   OT Start Time: 0916  OT Stop Time: 0927  OT Time Calculation (min): 11 min    Therapy Charges for Today     Code Description Service Date Service Provider Modifiers Qty    [id number]  OT EVAL LOW COMPLEXITY 2 5/4/2018 Daisy Payton, OTR GO 1               Daisy Payton OTR  5/4/2018

## 2018-05-04 NOTE — PLAN OF CARE
Problem: Patient Care Overview  Goal: Plan of Care Review  Outcome: Ongoing (interventions implemented as appropriate)   05/04/18 0405   Coping/Psychosocial   Plan of Care Reviewed With patient   Plan of Care Review   Progress no change   OTHER   Outcome Summary PT SLEEPING COMFORTABLY THIS SHIFT, NO S/SX OF DISTRESS OR DISCOMFORT. VSS. VOIDED XI THUS FAR SINCE 2000 5/4. IVF CONTINOUS. SITE ASYMPTOMATIC. NAD.     Goal: Individualization and Mutuality  Outcome: Ongoing (interventions implemented as appropriate)    Goal: Discharge Needs Assessment  Outcome: Ongoing (interventions implemented as appropriate)    Goal: Interprofessional Rounds/Family Conf  Outcome: Ongoing (interventions implemented as appropriate)      Problem: Skin Injury Risk (Adult)  Goal: Identify Related Risk Factors and Signs and Symptoms  Outcome: Ongoing (interventions implemented as appropriate)    Goal: Skin Health and Integrity  Outcome: Ongoing (interventions implemented as appropriate)      Problem: Confusion, Acute (Adult)  Goal: Identify Related Risk Factors and Signs and Symptoms  Outcome: Ongoing (interventions implemented as appropriate)    Goal: Cognitive/Functional Impairments Minimized  Outcome: Ongoing (interventions implemented as appropriate)    Goal: Safety  Outcome: Ongoing (interventions implemented as appropriate)      Problem: Renal Failure/Kidney Injury, Acute (Adult)  Goal: Signs and Symptoms of Listed Potential Problems Will be Absent, Minimized or Managed (Renal Failure/Kidney Injury, Acute)  Outcome: Ongoing (interventions implemented as appropriate)      Problem: Fall Risk (Adult)  Goal: Identify Related Risk Factors and Signs and Symptoms  Outcome: Ongoing (interventions implemented as appropriate)    Goal: Absence of Fall  Outcome: Ongoing (interventions implemented as appropriate)         grasps breast, tongue down, lips flanged, rhythmic sucking

## 2018-05-04 NOTE — PROGRESS NOTES
"Daily progress note    Chief complaint  More alert  Following some commands  Failed swallow study    History of present illness  86-year-old white female with multiple medical problems including diabetes mellitus mellitus hypertension hyperlipidemia anxiety disorder and depression hypothyroidism mood disorder glaucoma brought to the emergency room by the family with altered mental status going on for last 1 week.  Patient lives by herself and pretty independent.  No fever chills no chest pain shortness of breath abdominal pain nausea vomiting diarrhea.  Patient evaluated in ER found to be in acute kidney injury admitted for management.     REVIEW OF SYSTEMS  Review of Systems   Unable to perform ROS: Mental status change      PHYSICAL EXAM  Blood pressure 169/89, pulse 92, temperature 97.4 °F (36.3 °C), temperature source Oral, resp. rate 19, height 157.5 cm (62\"), weight 55.8 kg (123 lb), SpO2 96 %.    Constitutional: She is oriented to person, place, and time and well-developed, well-nourished, and in no distress. No distress.   Flat affect.  Doesn't speak unless asked a direct question.  Very limited history obtained from patient.     Head: Normocephalic and atraumatic.   Eyes: EOM are normal. Pupils are equal, round, and reactive to light.   Excessive blinking   Family states this is abnormal for pt    Neck: Normal range of motion. Neck supple.   Cardiovascular: Normal rate, regular rhythm, normal heart sounds and intact distal pulses.    Pulmonary/Chest: Effort normal. No respiratory distress.   Crackles at the R base    Abdominal: Soft. There is no tenderness. There is no rebound and no guarding.   Musculoskeletal: Normal range of motion. She exhibits no edema.   Neurological: She is alert and oriented to person, place, and time. She has normal sensation and normal strength.   Pt is oriented x2 on exam  PT is slow to respond, but answers questions appropriately and follows commands    Skin: Skin is warm and " dry. No rash noted.   Psychiatric: She has a flat affect.     LAB RESULTS  Lab Results (last 24 hours)     Procedure Component Value Units Date/Time    TSH [[id number]]  (Normal) Collected:  05/04/18 0659    Specimen:  Blood Updated:  05/04/18 0833     TSH 0.337 mIU/mL     BNP [[id number]]  (Normal) Collected:  05/04/18 0659    Specimen:  Blood Updated:  05/04/18 0833     proBNP 514.0 pg/mL     Narrative:       Among patients with dyspnea, NT-proBNP is highly sensitive for the detection of acute congestive heart failure. In addition NT-proBNP of <300 pg/ml effectively rules out acute congestive heart failure with 99% negative predictive value.    Comprehensive Metabolic Panel [[id number]]  (Abnormal) Collected:  05/04/18 0659    Specimen:  Blood Updated:  05/04/18 0825     Glucose 87 mg/dL      BUN 29 (H) mg/dL      Creatinine 0.86 mg/dL      Sodium 141 mmol/L      Potassium 4.4 mmol/L      Chloride 106 mmol/L      CO2 16.8 (L) mmol/L      Calcium 9.2 mg/dL      Total Protein 7.1 g/dL      Albumin 3.80 g/dL      ALT (SGPT) 16 U/L      AST (SGOT) 27 U/L      Alkaline Phosphatase 88 U/L      Total Bilirubin 0.5 mg/dL      eGFR Non African Amer 63 mL/min/1.73      Globulin 3.3 gm/dL      A/G Ratio 1.2 g/dL      BUN/Creatinine Ratio 33.7 (H)     Anion Gap 18.2 mmol/L     Narrative:       The MDRD GFR formula is only valid for adults with stable renal function between ages 18 and 70.    Uric Acid [[id number]]  (Abnormal) Collected:  05/04/18 0659    Specimen:  Blood Updated:  05/04/18 0824     Uric Acid 8.3 (H) mg/dL     Lipid Panel [[id number]]  (Abnormal) Collected:  05/04/18 0659    Specimen:  Blood Updated:  05/04/18 0824     Total Cholesterol 91 mg/dL      Triglycerides 93 mg/dL      HDL Cholesterol 32 (L) mg/dL      LDL Cholesterol  40 mg/dL      VLDL Cholesterol 18.6 mg/dL      LDL/HDL Ratio 1.26    Narrative:       Cholesterol Reference Ranges  (U.S[place] Department of Health and Human Services ATP III  Classifications)    Desirable          <200 mg/dL  Borderline High    200-239 mg/dL  High Risk          >240 mg/dL      Triglyceride Reference Ranges  (U.S. Department of Health and Human Services ATP III Classifications)    Normal           <150 mg/dL  Borderline High  150-199 mg/dL  High             200-499 mg/dL  Very High        >500 mg/dL    HDL Reference Ranges  (U.S. Department of Health and Human Services ATP III Classifcations)    Low     <40 mg/dl (major risk factor for CHD)  High    >60 mg/dl ('negative' risk factor for CHD)        LDL Reference Ranges  (U.S. Department of Health and Human Services ATP III Classifcations)    Optimal          <100 mg/dL  Near Optimal     100-129 mg/dL  Borderline High  130-159 mg/dL  High             160-189 mg/dL  Very High        >189 mg/dL    Phosphorus [[id number]]  (Abnormal) Collected:  05/04/18 0659    Specimen:  Blood Updated:  05/04/18 0824     Phosphorus 1.9 (L) mg/dL     Hemoglobin A1c [[id number]]  (Abnormal) Collected:  05/04/18 0659    Specimen:  Blood Updated:  05/04/18 0808     Hemoglobin A1C 6.20 (H) %     Narrative:       Hemoglobin A1C Ranges:    Increased Risk for Diabetes  5.7% to 6.4%  Diabetes                     >= 6.5%  Diabetic Goal                < 7.0%    CBC & Differential [[id number]] Collected:  05/04/18 0659    Specimen:  Blood Updated:  05/04/18 0755    Narrative:       The following orders were created for panel order CBC & Differential.  Procedure                               Abnormality         Status                     ---------                               -----------         ------                     CBC Auto Differential[[id number]]        Abnormal            Final result                 Please view results for these tests on the individual orders.    CBC Auto Differential [[id number]]  (Abnormal) Collected:  05/04/18 0659    Specimen:  Blood Updated:  05/04/18 0755     WBC 11.98 (H) 10*3/mm3      RBC 4.33 10*6/mm3      Hemoglobin  12.8 g/dL      Hematocrit 38.7 %      MCV 89.4 fL      MCH 29.6 pg      MCHC 33.1 g/dL      RDW 13.9 (H) %      RDW-SD 45.6 fl      MPV 10.1 fL      Platelets 563 (H) 10*3/mm3      Neutrophil % 80.9 (H) %      Lymphocyte % 11.3 (L) %      Monocyte % 7.3 %      Eosinophil % 0.3 %      Basophil % 0.2 %      Immature Grans % 0.3 %      Neutrophils, Absolute 9.70 (H) 10*3/mm3      Lymphocytes, Absolute 1.35 10*3/mm3      Monocytes, Absolute 0.87 10*3/mm3      Eosinophils, Absolute 0.04 10*3/mm3      Basophils, Absolute 0.02 10*3/mm3      Immature Grans, Absolute 0.03 10*3/mm3     POC Glucose Once [[id number]]  (Normal) Collected:  05/04/18 0727    Specimen:  Blood Updated:  05/04/18 0728     Glucose 85 mg/dL     Narrative:       Meter: [id number] : [id number] Ryann KRAFT    POC Glucose Once [[id number]]  (Normal) Collected:  05/03/18 2028    Specimen:  Blood Updated:  05/03/18 2029     Glucose 80 mg/dL     Narrative:       Meter: [id number] : [id number] Taz KRAFT    Blood Culture - Blood, [[id number]]  (Normal) Collected:  05/02/18 1939    Specimen:  Blood from Arm, Right Updated:  05/03/18 2000     Blood Culture No growth at 24 hours    Blood Culture - Blood, [[id number]]  (Normal) Collected:  05/02/18 1904    Specimen:  Blood from Arm, Left Updated:  05/03/18 1915     Blood Culture No growth at 24 hours    POC Glucose Once [[id number]]  (Normal) Collected:  05/03/18 1659    Specimen:  Blood Updated:  05/03/18 1701     Glucose 78 mg/dL     Narrative:       Meter: [id number] : [id number] [place] KRAFT    Eosinophil Smear - Urine, Urine, Clean Catch [[id number]]  (Normal) Collected:  05/03/18 1515    Specimen:  Urine from Urine, Clean Catch Updated:  05/03/18 1638     Eosinophil Smear 0 % EOS/100 Cells     CK [[id number]]  (Normal) Collected:  05/03/18 0643    Specimen:  Blood Updated:  05/03/18 1612     Creatine Kinase 159 U/L     Chloride, Urine, Random - [[id number]] Collected:  05/03/18 1517     Specimen:  Urine Updated:  05/03/18 1600     Chloride, Urine 139 mmol/L     Narrative:       Reference intervals for random urine have not been established.  Clinical usage is dependent upon physician's interpretation in combination with other laboratory tests.     Creatinine, Urine, Random - [[id number]] Collected:  05/03/18 1515    Specimen:  Urine Updated:  05/03/18 1600     Creatinine, Urine 33.7 mg/dL     Narrative:       Reference intervals for random urine have not been established.  Clinical usage is dependent upon physician's interpretation in combination with other laboratory tests.     Sodium, Urine, Random - [[id number]] Collected:  05/03/18 1515    Specimen:  Urine Updated:  05/03/18 1600     Sodium, Urine 147 mmol/L     Narrative:       Reference intervals for random urine have not been established.  Clinical usage is dependent upon physician's interpretation in combination with other laboratory tests.     Protein, Urine, Random - [[id number]] Collected:  05/03/18 1515    Specimen:  Urine Updated:  05/03/18 1600     Total Protein, Urine 67.0 mg/dL     Narrative:       Reference intervals for random urine have not been established.  Clinical usage is dependent upon physician's interpretation in combination with other laboratory tests.     Urinalysis With / Microscopic If Indicated - [[id number]]  (Abnormal) Collected:  05/03/18 1515    Specimen:  Urine Updated:  05/03/18 1529     Color, UA Yellow     Appearance, UA Clear     pH, UA 5.5     Specific Gravity, UA 1.015     Glucose, UA Negative     Ketones, UA 15 mg/dL (1+) (A)     Bilirubin, UA Negative     Blood, UA Negative     Protein,  mg/dL (2+) (A)     Leuk Esterase, UA Negative     Nitrite, UA Negative     Urobilinogen, UA 0.2 E.U./dL    Urinalysis, Microscopic Only - Urine, Clean Catch [[id number]] Collected:  05/03/18 1515    Specimen:  Urine from Urine, Clean Catch Updated:  05/03/18 1529     RBC, UA 0-2 /HPF      WBC, UA 0-2 /HPF      Bacteria,  UA None Seen /HPF      Squamous Epithelial Cells, UA 0-2 /HPF      Hyaline Casts, UA 0-2 /LPF      Methodology Automated Microscopy    T3, Free [[id number]]  (Abnormal) Collected:  05/03/18 0643    Specimen:  Blood Updated:  05/03/18 1223     T3, Free 1.77 (L) pg/mL     T4, Free [[id number]]  (Abnormal) Collected:  05/03/18 0643    Specimen:  Blood Updated:  05/03/18 1223     Free T4 1.93 (H) ng/dL     T4 [[id number]]  (Normal) Collected:  05/03/18 0643    Specimen:  Blood Updated:  05/03/18 1223     T4, Total 11.22 mcg/dL     Narrative:       The concentration of Total T4 in samples from pregnant women is erroneously low (20%) when measured using the access Total T4 Assay.  Erroneously low results could mask hyperthyroidism.  Do not use the Access Total T4 assay as the only marker for evaluating pregnant patients for thyroid disorders.        Imaging Results (last 24 hours)     Procedure Component Value Units Date/Time    US Renal Bilateral [[id number]] Collected:  05/03/18 1731     Updated:  05/03/18 1946    Narrative:       RENAL SONOGRAM     HISTORY: Acute renal failure.     TECHNIQUE: Bilateral renal sonogram was performed in standard fashion.     FINDINGS: Both kidneys demonstrate normal echotexture. The right kidney  measures 9.9 x 4.9 x 4.8 cm and the left kidney measures 9 x 4.7 x 4.3  cm. A 7 mm diameter echogenic focus with posterior shadowing is observed  in the left renal collecting system and suspicious for calculus. There  is no hydronephrosis. The urinary bladder appears normal.       Impression:       Suspected left renal calculus. Otherwise negative renal  sonogram. There is no evidence of obstruction.     This report was finalized on 5/3/2018 7:43 PM by Dr. Deacon Santiago M.D.           EKG                              Rhythm/Rate: sinus rhythm, 82  P waves and GA: normal  QRS, axis: normal QRS, borderline prolonged QT   ST and T waves: Nonspecific        Current Facility-Administered Medications:    •  amLODIPine (NORVASC) tablet 10 mg, 10 mg, Oral, Daily, Misha Suero MD, 10 mg at 05/04/18 0928  •  ARIPiprazole (ABILIFY) tablet 5 mg, 5 mg, Oral, Nightly, Misha Suero MD  •  aspirin chewable tablet 81 mg, 81 mg, Oral, Daily, Misha Suero MD, 81 mg at 05/04/18 0928  •  atorvastatin (LIPITOR) tablet 10 mg, 10 mg, Oral, Daily, Misha Suero MD, 10 mg at 05/04/18 0928  •  insulin aspart (novoLOG) injection 0-7 Units, 0-7 Units, Subcutaneous, 4x Daily With Meals & Nightly, Misha Suero MD  •  latanoprost (XALATAN) 0.005 % ophthalmic solution 1 drop, 1 drop, Both Eyes, Nightly, Misha Suero MD, 1 drop at 05/03/18 2031  •  levothyroxine (SYNTHROID, LEVOTHROID) tablet 25 mcg, 25 mcg, Oral, QAM, Misha Suero MD, 25 mcg at 05/04/18 0928  •  multivitamin (THERAGRAN) tablet 1 tablet, 1 tablet, Oral, Daily, Misha Suero MD, 1 tablet at 05/04/18 0928  •  pantoprazole (PROTONIX) EC tablet 40 mg, 40 mg, Oral, QAM, Misha Suero MD, 40 mg at 05/04/18 0929  •  sodium chloride 0.9 % flush 10 mL, 10 mL, Intravenous, PRN, Obinna Fairbanks MD  •  sodium chloride 0.9 % with KCl 20 mEq/L infusion, 125 mL/hr, Intravenous, Continuous, Misha Suero MD, Last Rate: 125 mL/hr at 05/04/18 0929, 125 mL/hr at 05/04/18 0929  •  timolol (TIMOPTIC) 0.5 % ophthalmic solution 1 drop, 1 drop, Both Eyes, Daily, Misha Suero MD, 1 drop at 05/04/18 0929  •  venlafaxine (EFFEXOR) tablet 75 mg, 75 mg, Oral, Daily, Misha Suero MD, 75 mg at 05/04/18 0928     ASSESSMENT  Acute kidney injury  Chronic kidney disease stage III  Metabolic encephalopathic  Diabetes mellitus  Hypertension  Hyperlipidemia  Hypothyroidism  Mood disorder  Anxiety disorder  Depression  Gastroesophageal reflux disease    PLAN  CPM  IV fluid  Stop all nephrotoxic agents including ACE inhibitors Motrin Glucophage  Nephrology consult appreciated  Accu-Chek with sliding scale insulin  Celis catheter  Supportive care  Stress ulcer DVT prophylaxis  PTOT  Discussed with family  Follow closely  further recommendation according to hospital course    JONAH PIMENTEL MD

## 2018-05-04 NOTE — THERAPY EVALUATION
Acute Care - Physical Therapy Initial Evaluation   [facility]     Patient Name: Ivett Lacy  : 1932  MRN: [id number]  Today's Date: 2018   Onset of Illness/Injury or Date of Surgery: 18     Referring Physician: Nimo      Admit Date: 2018    Visit Dx:     ICD-10-CM ICD-9-CM   1. Acute renal failure, unspecified acute renal failure type N17.9 584.9   2. Acute metabolic encephalopathy G93.41 348.31   3. Decreased mobility R26.89 781.99     Patient Active Problem List   Diagnosis   • Acute renal failure     Past Medical History:   Diagnosis Date   • Depression    • Diabetes mellitus     TYPE 2   • Disease of thyroid gland     HYPOTHYROID   • Glaucoma    • Hyperlipidemia    • Hypertension    • Insomnia    • Mood disorder      Past Surgical History:   Procedure Laterality Date   • GALLBLADDER SURGERY     • HYSTERECTOMY          PT ASSESSMENT (last 12 hours)      Physical Therapy Evaluation     Row Name 18 0935          PT Evaluation Time/Intention    Subjective Information no complaints  -EE     Document Type evaluation  -EE     Patient Effort good  -EE     Symptoms Noted During/After Treatment fatigue  -EE     Comment Minimal verbalizations; answers questions with one-word responses. Granddaughter at bedside assisted with providing history.  -EE     Row Name 18 0935          General Information    Onset of Illness/Injury or Date of Surgery 18  -EE     Referring Physician Nimo  -CHICA     Patient Observations alert;cooperative;agree to therapy  -EE     Patient/Family Observations Pt supine in bed in no acute distress  -EE     Prior Level of Function independent:;all household mobility;community mobility  -EE     Equipment Currently Used at Home none  -EE     Pertinent History of Current Functional Problem acute renal failure, dehydration, metabolic encephalopathy  -EE     Existing Precautions/Restrictions fall  -EE     Barriers to Rehab none identified  -EE     Row Name 18  0935          Relationship/Environment    Lives With alone;other (see comments)   may DC home w/granddaughter  -EE     Row Name 05/04/18 0935          Resource/Environmental Concerns    Current Living Arrangements home/apartment/condo  -EE     Row Name 05/04/18 0935          Home Main Entrance    Number of Stairs, Main Entrance two;other (see comments)   granddaughter states she has ramp at her home  -EE     Row Name 05/04/18 0935          Cognitive Assessment/Interventions    Additional Documentation Cognitive Assessment/Intervention (Group)  -EE     Row Name 05/04/18 0935          Cognitive Assessment/Intervention- PT/OT    Affect/Mental Status (Cognitive) flat/blunted affect;low arousal/lethargic  -EE     Orientation Status (Cognition) oriented to;person  -EE     Follows Commands (Cognition) over 90% accuracy;follows one step commands;increased processing time needed;repetition of directions required;verbal cues/prompting required  -EE     Personal Safety Interventions fall prevention program maintained;gait belt;muscle strengthening facilitated;nonskid shoes/slippers when out of bed;supervised activity  -EE     Row Name 05/04/18 0935          Safety Issues, Functional Mobility    Safety Issues Affecting Function (Mobility) safety precaution awareness  -EE     Impairments Affecting Function (Mobility) balance;cognition;endurance/activity tolerance;strength  -EE     Row Name 05/04/18 0935          Bed Mobility Assessment/Treatment    Bed Mobility Assessment/Treatment supine-sit  -EE     Supine-Sit [place] (Bed Mobility) minimum assist (75% patient effort)  -EE     Assistive Device (Bed Mobility) bed rails;head of bed elevated  -EE     Row Name 05/04/18 0935          Transfer Assessment/Treatment    Transfer Assessment/Treatment sit-stand transfer;stand-sit transfer  -EE     Sit-Stand [place] (Transfers) minimum assist (75% patient effort);verbal cues  -EE     Stand-Sit [place] (Transfers) contact  guard;verbal cues  -EE     Row Name 05/04/18 0935          Sit-Stand Transfer    Assistive Device (Sit-Stand Transfers) walker, front-wheeled  -EE     Row Name 05/04/18 0935          Gait/Stairs Assessment/Training    Coats Level (Gait) contact guard;minimum assist (75% patient effort);verbal cues  -EE     Assistive Device (Gait) walker, front-wheeled  -EE     Distance in Feet (Gait) 200  -EE     Deviations/Abnormal Patterns (Gait) base of support, narrow;mando decreased;stride length decreased  -EE     Comment (Gait/Stairs) Min A to guide rwx around turns; also ambulated short distance in room w/HHA. Pt does not typically use walker at baseline.  -EE     Row Name 05/04/18 0935          General ROM    GENERAL ROM COMMENTS B LEs grossly WFL  -EE     Row Name 05/04/18 0935          General Assessment (Manual Muscle Testing)    General Manual Muscle Testing (MMT) Assessment lower extremity strength deficits identified  -EE     Row Name 05/04/18 0935          Lower Extremity (Manual Muscle Testing)    Comment, MMT: Lower Extremity generalized weakness observed functionally; B LEs grossly 4-/5  -EE     Row Name 05/04/18 0935          Pain Assessment    Additional Documentation Pain Scale: Numbers Pre/Post-Treatment (Group)  -EE     Row Name 05/04/18 0935          Pain Scale: Numbers Pre/Post-Treatment    Pain Scale: Numbers, Pretreatment 0/10 - no pain  -EE     Row Name 05/04/18 0935          Plan of Care Review    Plan of Care Reviewed With patient;family  -EE     Row Name 05/04/18 0935          Physical Therapy Clinical Impression    Patient/Family Goals Statement (PT Clinical Impression) Pt did not verbalize; granddaughter expressed desire for pt to go home  -EE     Criteria for Skilled Interventions Met (PT Clinical Impression) yes;treatment indicated  -EE     Pathology/Pathophysiology Noted (Describe Specifically for Each System) musculoskeletal  -EE     Impairments Found (describe specific impairments)  gait, locomotion, and balance;aerobic capacity/endurance  -EE     Rehab Potential (PT Clinical Summary) good, to achieve stated therapy goals  -EE     Row Name 05/04/18 0935          Physical Therapy Goals    Bed Mobility Goal Selection (PT) bed mobility, PT goal 1  -EE     Transfer Goal Selection (PT) transfer, PT goal 1  -EE     Gait Training Goal Selection (PT) gait training, PT goal 1  -EE     Row Name 05/04/18 0935          Bed Mobility Goal 1 (PT)    Activity/Assistive Device (Bed Mobility Goal 1, PT) bed mobility activities, all  -EE     Warren Level/Cues Needed (Bed Mobility Goal 1, PT) conditional independence  -EE     Time Frame (Bed Mobility Goal 1, PT) 1 week  -EE     Progress/Outcomes (Bed Mobility Goal 1, PT) goal ongoing  -EE     Row Name 05/04/18 0935          Transfer Goal 1 (PT)    Activity/Assistive Device (Transfer Goal 1, PT) transfers, all   with least restrictive AD  -EE     Warren Level/Cues Needed (Transfer Goal 1, PT) supervision required  -EE     Time Frame (Transfer Goal 1, PT) 1 week  -EE     Progress/Outcome (Transfer Goal 1, PT) goal ongoing  -EE     Row Name 05/04/18 0935          Gait Training Goal 1 (PT)    Activity/Assistive Device (Gait Training Goal 1, PT) gait (walking locomotion)   with least restrictive AD  -EE     Warren Level (Gait Training Goal 1, PT) standby assist  -EE     Distance (Gait Goal 1, PT) 200  -EE     Time Frame (Gait Training Goal 1, PT) 1 week  -EE     Progress/Outcome (Gait Training Goal 1, PT) goal ongoing  -EE     Row Name 05/04/18 0935          Patient Education Goal (PT)    Activity (Patient Education Goal, PT) HEP for LE strengthening  -EE     Warren/Cues/Accuracy (Memory Goal 2, PT) demonstrates adequately;verbalizes understanding  -EE     Time Frame (Patient Education Goal, PT) 1 week  -EE     Progress/Outcome (Patient Education Goal, PT) goal ongoing  -EE     Row Name 05/04/18 0935          Positioning and Restraints     Pre-Treatment Position in bed  -EE     Post Treatment Position chair  -EE     In Chair sitting;call light within reach;encouraged to call for assist;with family/caregiver;notified nsg  -EE     Row Name 05/04/18 0935          Living Environment    Home Accessibility stairs to enter home  -EE       User Key  (r) = Recorded By, (t) = Taken By, (c) = Cosigned By    Initials Name Provider Type    EE Ana Bradley PT Physical Therapist          Physical Therapy Education     Title: PT OT SLP Therapies (Active)     Topic: Physical Therapy (Active)     Point: Mobility training (Active)    Learning Progress Summary     Learner Status Readiness Method Response Comment Documented by    Patient Active Acceptance E,TB NR  EE 05/04/18 1015          Point: Body mechanics (Active)    Learning Progress Summary     Learner Status Readiness Method Response Comment Documented by    Patient Active Acceptance E,TB NR  EE 05/04/18 1015                      User Key     Initials Effective Dates Name Provider Type Discipline    EE 04/03/18 -  Ana Bradley PT Physical Therapist PT                PT Recommendation and Plan  Anticipated Discharge Disposition (PT): home with home health care  Planned Therapy Interventions (PT Eval): balance training, bed mobility training, gait training, home exercise program, patient/family education, strengthening, stair training, transfer training  Therapy Frequency (PT Clinical Impression): daily  Outcome Summary/Treatment Plan (PT)  Anticipated Discharge Disposition (PT): home with home health care  Plan of Care Reviewed With: patient  Outcome Summary: Pt presents with acute renal failure, dehydration, metabolic encephalopathy. Upon exam, pt demonstrates impaired cognition, generalized weakness, impaired balance, and decreased endurance limiting mobility. Pt currently requiring min A for mobility; granddaughter states pt is very independent w/mobility at baseline. Pt would benefit from continued PT to  address impairments and assist w/return to PLOF.           Outcome Measures     Row Name 05/04/18 1000             How much help from another person do you currently need...    Turning from your back to your side while in flat bed without using bedrails? 3  -EE      Moving from lying on back to sitting on the side of a flat bed without bedrails? 3  -EE      Moving to and from a bed to a chair (including a wheelchair)? 3  -EE      Standing up from a chair using your arms (e.g., wheelchair, bedside chair)? 3  -EE      Climbing 3-5 steps with a railing? 2  -EE      To walk in hospital room? 3  -EE      AM-PAC 6 Clicks Score 17  -EE         Functional Assessment    Outcome Measure Options AM-PAC 6 Clicks Basic Mobility (PT)  -EE        User Key  (r) = Recorded By, (t) = Taken By, (c) = Cosigned By    Initials Name Provider Type    EE Ana Bradley PT Physical Therapist           Time Calculation:         PT Charges     Row Name 05/04/18 1017             Time Calculation    Start Time 0935  -EE      Stop Time 1000  -EE      Time Calculation (min) 25 min  -EE      PT Received On 05/04/18  -EE      PT - Next Appointment 05/05/18  -EE      PT Goal Re-Cert Due Date 05/11/18  -EE        User Key  (r) = Recorded By, (t) = Taken By, (c) = Cosigned By    Initials Name Provider Type    EE Ana Bradley PT Physical Therapist          Therapy Charges for Today     Code Description Service Date Service Provider Modifiers Qty    36884224655 HC PT EVAL MOD COMPLEXITY 2 5/4/2018 Ana Bradley, PT GP 1    58037602110 HC PT THER PROC EA 15 MIN 5/4/2018 Ana Bradley PT GP 1    32657922482 HC PT THER SUPP EA 15 MIN 5/4/2018 Ana Bradley, PT GP 1          PT G-Codes  Outcome Measure Options: AM-PAC 6 Clicks Basic Mobility (PT)      Ana Bradley PT  5/4/2018

## 2018-05-04 NOTE — PROGRESS NOTES
Continued Stay Note   [facility]     Patient Name: Ivett Lacy  MRN: [id number]  Today's Date: 5/4/2018    Admit Date: 5/2/2018          Discharge Plan     Row Name 05/04/18 1437       Plan    Plan assisted living at [facility] with [facility]     Patient/Family in Agreement with Plan yes    Plan Comments CCP met with patient's family, David and Daisy Eason ([phone]) and granddaughter Andrew. CCP discussed discharge options for patient. Patient has confusion. Patient's son, stated they have been looking at assisted living and have deposit down to [facility]. CCP discussed private pay at SNF due to patient walking 200 feet. Patient's family agreeable to home health at [facility] assisted living. Patient's family agreeable to [facility]; CCP left voicemail for [facility]. CCP will follow for any needs to arise. aVndana CHAVEZ               Discharge Codes    No documentation.           SCOTT Robbins

## 2018-05-04 NOTE — DISCHARGE PLACEMENT REQUEST
"Sharon Ovalle (86 y.o. Female)     Date of Birth Social Security Number Address Home Phone MRN    02/07/1932  [address]  [facility] [id number] [phone] [id number]    Nondenominational Marital Status          Moravian        Admission Date Admission Type Admitting Provider Attending Provider Department, Room/Bed    5/2/18 Emergency Misha Suero MD Ahmed, Aftab, MD [address], 659/1    Discharge Date Discharge Disposition Discharge Destination                       Attending Provider:  Misha Suero MD    Allergies:  Prozac [Fluoxetine Hcl]    Isolation:  None   Infection:  None   Code Status:  FULL    Ht:  157.5 cm (62\")   Wt:  55.8 kg (123 lb)    Admission Cmt:  None   Principal Problem:  None                Active Insurance as of 5/2/2018     Primary Coverage     Payor Plan Insurance Group Employer/Plan Group    MEDICARE MEDICARE A ONLY      Payor Plan Address Payor Plan Phone Number Effective From Effective To    [address] [phone] 2/1/1997     [place] [address]       Subscriber Name Subscriber Birth Date Member ID       SHARON OVALLE 2/7/1932 [id number]           Secondary Coverage     Payor Plan Insurance Group Employer/Plan Group    [id number]     Payor Plan Address Payor Plan Phone Number Effective From Effective To    [address]  1/1/2006     [place] [address]       Subscriber Name Subscriber Birth Date Member ID       SHARON OVALLE 2/7/1932 [id number]                 Emergency Contacts      (Rel.) Home Phone Work Phone Mobile Phone    David Eason (Son) [phone] -- --              "

## 2018-05-04 NOTE — PROGRESS NOTES
"   LOS: 2 days   Patient Care Team:  Madelaine Parisi MD as PCP - General (Family Medicine)    Chief Complaint/ Reason for encounter: Acute renal failure, severe dehydration  Chief Complaint   Patient presents with   • Altered Mental Status         Subjective     History of Present Illness    Subjective:  Symptoms:  Improved.  She reports weakness.  No shortness of breath or chest pain.  (She seems to be doing better today  More alert, awake, following commands, improved eye contact).    Diet:  Poor intake.    Activity level: Impaired due to weakness.    Pain:  She reports no pain.          History taken from: Patient and chart    Objective     Vital Signs  Temp:  [97.3 °F (36.3 °C)-98.8 °F (37.1 °C)] 97.4 °F (36.3 °C)  Heart Rate:  [83-92] 92  Resp:  [16-19] 19  BP: (169-175)/(81-92) 169/89       Wt Readings from Last 1 Encounters:   05/03/18 1317 55.8 kg (123 lb)   05/02/18 2230 55.9 kg (123 lb 4.8 oz)   05/02/18 1737 65.8 kg (145 lb)       Objective:  General Appearance:  Comfortable, in no acute distress and not in pain (Chronically ill-appearing).    Vital signs: (most recent): Blood pressure 169/89, pulse 92, temperature 97.4 °F (36.3 °C), temperature source Oral, resp. rate 19, height 157.5 cm (62\"), weight 55.8 kg (123 lb), SpO2 96 %.  Vital signs are normal.  No fever.    Output: Producing urine.    HEENT: Normal HEENT exam.    Lungs:  Normal effort and normal respiratory rate.  Breath sounds clear to auscultation.  She is not in respiratory distress.  No decreased breath sounds.    Heart: Normal rate.  Regular rhythm.  S1 normal.  No murmur.   Abdomen: Abdomen is soft and non-distended.  Bowel sounds are normal.   There is no abdominal tenderness.     Extremities: Normal range of motion.  There is no deformity or dependent edema.    Pulses: Distal pulses are intact.    Neurological: Patient is alert.    Skin:  Warm and dry.  No rash or cyanosis.             Results Review:    Past Medical History: " reviewed  Past Medical History:   Diagnosis Date   • Depression    • Diabetes mellitus     TYPE 2   • Disease of thyroid gland     HYPOTHYROID   • Glaucoma    • Hyperlipidemia    • Hypertension    • Insomnia    • Mood disorder          Allergies:  Allergies   Allergen Reactions   • Prozac [Fluoxetine Hcl] Unknown (See Comments)     Off of nursing home paperwork       Intake/Output:     Intake/Output Summary (Last 24 hours) at 05/04/18 1318  Last data filed at 05/04/18 0625   Gross per 24 hour   Intake             1000 ml   Output                1 ml   Net              999 ml         DATA:  Radiology and Labs:  The following labs independently reviewed by me.  Interval notes, chart personally reviewed by me.   Old records independently reviewed showing Normal prior renal function, no chronic kidney disease  The following radiologic studies independently viewed by me, findings renal ultrasound with no obstruction, left renal calculus  New problems include metabolic acidosis  Discussed with her family members at bedside and provided additional history, past medical history        Labs:   Recent Results (from the past 24 hour(s))   Chloride, Urine, Random -    Collection Time: 05/03/18  3:15 PM   Result Value Ref Range    Chloride, Urine 139 mmol/L   Creatinine, Urine, Random -    Collection Time: 05/03/18  3:15 PM   Result Value Ref Range    Creatinine, Urine 33.7 mg/dL   Eosinophil Smear - Urine, Urine, Clean Catch    Collection Time: 05/03/18  3:15 PM   Result Value Ref Range    Eosinophil Smear 0 0 - 0 % EOS/100 Cells   Urinalysis With / Microscopic If Indicated -    Collection Time: 05/03/18  3:15 PM   Result Value Ref Range    Color, UA Yellow Yellow, Straw    Appearance, UA Clear Clear    pH, UA 5.5 5.0 - 8.0    Specific Gravity, UA 1.015 1.005 - 1.030    Glucose, UA Negative Negative    Ketones, UA 15 mg/dL (1+) (A) Negative    Bilirubin, UA Negative Negative    Blood, UA Negative Negative    Protein,   mg/dL (2+) (A) Negative    Leuk Esterase, UA Negative Negative    Nitrite, UA Negative Negative    Urobilinogen, UA 0.2 E.U./dL 0.2 - 1.0 E.U./dL   Sodium, Urine, Random -    Collection Time: 05/03/18  3:15 PM   Result Value Ref Range    Sodium, Urine 147 mmol/L   Protein, Urine, Random -    Collection Time: 05/03/18  3:15 PM   Result Value Ref Range    Total Protein, Urine 67.0 mg/dL   Urinalysis, Microscopic Only - Urine, Clean Catch    Collection Time: 05/03/18  3:15 PM   Result Value Ref Range    RBC, UA 0-2 None Seen, 0-2 /HPF    WBC, UA 0-2 None Seen, 0-2 /HPF    Bacteria, UA None Seen None Seen /HPF    Squamous Epithelial Cells, UA 0-2 None Seen, 0-2 /HPF    Hyaline Casts, UA 0-2 None Seen /LPF    Methodology Automated Microscopy    POC Glucose Once    Collection Time: 05/03/18  4:59 PM   Result Value Ref Range    Glucose 78 70 - 130 mg/dL   POC Glucose Once    Collection Time: 05/03/18  8:28 PM   Result Value Ref Range    Glucose 80 70 - 130 mg/dL   Comprehensive Metabolic Panel    Collection Time: 05/04/18  6:59 AM   Result Value Ref Range    Glucose 87 65 - 99 mg/dL    BUN 29 (H) 8 - 23 mg/dL    Creatinine 0.86 0.57 - 1.00 mg/dL    Sodium 141 136 - 145 mmol/L    Potassium 4.4 3.5 - 5.2 mmol/L    Chloride 106 98 - 107 mmol/L    CO2 16.8 (L) 22.0 - 29.0 mmol/L    Calcium 9.2 8.6 - 10.5 mg/dL    Total Protein 7.1 6.0 - 8.5 g/dL    Albumin 3.80 3.50 - 5.20 g/dL    ALT (SGPT) 16 1 - 33 U/L    AST (SGOT) 27 1 - 32 U/L    Alkaline Phosphatase 88 39 - 117 U/L    Total Bilirubin 0.5 0.1 - 1.2 mg/dL    eGFR Non African Amer 63 >60 mL/min/1.73    Globulin 3.3 gm/dL    A/G Ratio 1.2 g/dL    BUN/Creatinine Ratio 33.7 (H) 7.0 - 25.0    Anion Gap 18.2 mmol/L   BNP    Collection Time: 05/04/18  6:59 AM   Result Value Ref Range    proBNP 514.0 0.0 - 1,800.0 pg/mL   TSH    Collection Time: 05/04/18  6:59 AM   Result Value Ref Range    TSH 0.337 0.270 - 4.200 mIU/mL   Lipid Panel    Collection Time: 05/04/18  6:59 AM    Result Value Ref Range    Total Cholesterol 91 0 - 200 mg/dL    Triglycerides 93 0 - 150 mg/dL    HDL Cholesterol 32 (L) 40 - 60 mg/dL    LDL Cholesterol  40 0 - 100 mg/dL    VLDL Cholesterol 18.6 5 - 40 mg/dL    LDL/HDL Ratio 1.26    Hemoglobin A1c    Collection Time: 05/04/18  6:59 AM   Result Value Ref Range    Hemoglobin A1C 6.20 (H) 4.80 - 5.60 %   Uric Acid    Collection Time: 05/04/18  6:59 AM   Result Value Ref Range    Uric Acid 8.3 (H) 2.4 - 5.7 mg/dL   CBC Auto Differential    Collection Time: 05/04/18  6:59 AM   Result Value Ref Range    WBC 11.98 (H) 4.50 - 10.70 10*3/mm3    RBC 4.33 3.90 - 5.20 10*6/mm3    Hemoglobin 12.8 11.9 - 15.5 g/dL    Hematocrit 38.7 35.6 - 45.5 %    MCV 89.4 80.5 - 98.2 fL    MCH 29.6 26.9 - 32.0 pg    MCHC 33.1 32.4 - 36.3 g/dL    RDW 13.9 (H) 11.7 - 13.0 %    RDW-SD 45.6 37.0 - 54.0 fl    MPV 10.1 6.0 - 12.0 fL    Platelets 563 (H) 140 - 500 10*3/mm3    Neutrophil % 80.9 (H) 42.7 - 76.0 %    Lymphocyte % 11.3 (L) 19.6 - 45.3 %    Monocyte % 7.3 5.0 - 12.0 %    Eosinophil % 0.3 0.3 - 6.2 %    Basophil % 0.2 0.0 - 1.5 %    Immature Grans % 0.3 0.0 - 0.5 %    Neutrophils, Absolute 9.70 (H) 1.90 - 8.10 10*3/mm3    Lymphocytes, Absolute 1.35 0.90 - 4.80 10*3/mm3    Monocytes, Absolute 0.87 0.20 - 1.20 10*3/mm3    Eosinophils, Absolute 0.04 0.00 - 0.70 10*3/mm3    Basophils, Absolute 0.02 0.00 - 0.20 10*3/mm3    Immature Grans, Absolute 0.03 0.00 - 0.03 10*3/mm3   Phosphorus    Collection Time: 05/04/18  6:59 AM   Result Value Ref Range    Phosphorus 1.9 (L) 2.5 - 4.5 mg/dL   POC Glucose Once    Collection Time: 05/04/18  7:27 AM   Result Value Ref Range    Glucose 85 70 - 130 mg/dL   POC Glucose Once    Collection Time: 05/04/18 11:51 AM   Result Value Ref Range    Glucose 94 70 - 130 mg/dL       Radiology:  Imaging Results (last 24 hours)     Procedure Component Value Units Date/Time    US Renal Bilateral [[id number]] Collected:  05/03/18 1731     Updated:  05/03/18 1946     Narrative:       RENAL SONOGRAM     HISTORY: Acute renal failure.     TECHNIQUE: Bilateral renal sonogram was performed in standard fashion.     FINDINGS: Both kidneys demonstrate normal echotexture. The right kidney  measures 9.9 x 4.9 x 4.8 cm and the left kidney measures 9 x 4.7 x 4.3  cm. A 7 mm diameter echogenic focus with posterior shadowing is observed  in the left renal collecting system and suspicious for calculus. There  is no hydronephrosis. The urinary bladder appears normal.       Impression:       Suspected left renal calculus. Otherwise negative renal  sonogram. There is no evidence of obstruction.     This report was finalized on 5/3/2018 7:43 PM by Dr. Deacon Santiago M.D.                Medications have been reviewed:  Current Facility-Administered Medications   Medication Dose Route Frequency Provider Last Rate Last Dose   • amLODIPine (NORVASC) tablet 10 mg  10 mg Oral Daily Misha Suero MD   10 mg at 05/04/18 0928   • ARIPiprazole (ABILIFY) tablet 5 mg  5 mg Oral Nightly Misha Suero MD       • aspirin chewable tablet 81 mg  81 mg Oral Daily Misha Suero MD   81 mg at 05/04/18 0928   • atorvastatin (LIPITOR) tablet 10 mg  10 mg Oral Daily Misha Suero MD   10 mg at 05/04/18 0928   • insulin aspart (novoLOG) injection 0-7 Units  0-7 Units Subcutaneous 4x Daily With Meals & Nightly Misha Suero MD       • latanoprost (XALATAN) 0.005 % ophthalmic solution 1 drop  1 drop Both Eyes Nightly Misha Suero MD   1 drop at 05/03/18 2031   • levothyroxine (SYNTHROID, LEVOTHROID) tablet 25 mcg  25 mcg Oral QAM Misha Suero MD   25 mcg at 05/04/18 0928   • multivitamin (THERAGRAN) tablet 1 tablet  1 tablet Oral Daily Misha Suero MD   1 tablet at 05/04/18 0928   • pantoprazole (PROTONIX) EC tablet 40 mg  40 mg Oral QAM Misha Suero MD   40 mg at 05/04/18 0929   • sodium chloride 0.9 % flush 10 mL  10 mL Intravenous PRN Obinna Fairbanks MD       • sodium chloride 0.9 % with KCl 20 mEq/L infusion  75 mL/hr  Intravenous Continuous Misha Suero  mL/hr at 05/04/18 0929 125 mL/hr at 05/04/18 0929   • timolol (TIMOPTIC) 0.5 % ophthalmic solution 1 drop  1 drop Both Eyes Daily Misha Suero MD   1 drop at 05/04/18 0929   • venlafaxine (EFFEXOR) tablet 75 mg  75 mg Oral Daily Misha Suero MD   75 mg at 05/04/18 0928       ASSESSMENT:  Acute renal failure, improved since admission, near baseline  dehydration  Mild leukocytosis  new metabolic acidosis, possibly from isotonic fluids, a bit worse today  Proteinuria, around 2 g per quantification, likely from hypertension  Chronic hypertension, worsening control  Type 2 diabetes mellitus  Probable stage II to 3 chronic kidney disease     Plan:   Continue IV fluids another 24 hours but decrease rate to 50 cc/h  Continue to encourage oral fluid intake  Add sodium bicarbonate  Blood pressure control bit worse today, okay to restart low-dose lisinopril    Continue to hold NSAIDs  , diuretics  Check labs in a.m.  Discussed with family at bedside    Continue to monitor renal function, electroytes and volume closely   Please call me with any questions or concerns    Christopher Fuller MD   [facility]   Office phone number: [phone]  Answering service phone number: [phone]    05/04/18  1:18 PM      Dictation performed using Dragon dictation software

## 2018-05-04 NOTE — PLAN OF CARE
Problem: Patient Care Overview  Goal: Plan of Care Review  Outcome: Ongoing (interventions implemented as appropriate)      Problem: Confusion, Acute (Adult)  Goal: Cognitive/Functional Impairments Minimized  Outcome: Ongoing (interventions implemented as appropriate)      Problem: Renal Failure/Kidney Injury, Acute (Adult)  Goal: Signs and Symptoms of Listed Potential Problems Will be Absent, Minimized or Managed (Renal Failure/Kidney Injury, Acute)  Outcome: Ongoing (interventions implemented as appropriate)      Problem: Fall Risk (Adult)  Goal: Absence of Fall  Outcome: Ongoing (interventions implemented as appropriate)

## 2018-05-05 LAB
ANION GAP SERPL CALCULATED.3IONS-SCNC: 14.8 MMOL/L
BASOPHILS # BLD AUTO: 0.04 10*3/MM3 (ref 0–0.2)
BASOPHILS NFR BLD AUTO: 0.3 % (ref 0–1.5)
BUN BLD-MCNC: 21 MG/DL (ref 8–23)
BUN/CREAT SERPL: 23.3 (ref 7–25)
CALCIUM SPEC-SCNC: 9.2 MG/DL (ref 8.6–10.5)
CHLORIDE SERPL-SCNC: 108 MMOL/L (ref 98–107)
CO2 SERPL-SCNC: 17.2 MMOL/L (ref 22–29)
CREAT BLD-MCNC: 0.9 MG/DL (ref 0.57–1)
DEPRECATED RDW RBC AUTO: 46.4 FL (ref 37–54)
EOSINOPHIL # BLD AUTO: 0.14 10*3/MM3 (ref 0–0.7)
EOSINOPHIL NFR BLD AUTO: 1.1 % (ref 0.3–6.2)
ERYTHROCYTE [DISTWIDTH] IN BLOOD BY AUTOMATED COUNT: 14 % (ref 11.7–13)
GFR SERPL CREATININE-BSD FRML MDRD: 59 ML/MIN/1.73
GLUCOSE BLD-MCNC: 100 MG/DL (ref 65–99)
GLUCOSE BLDC GLUCOMTR-MCNC: 100 MG/DL (ref 70–130)
GLUCOSE BLDC GLUCOMTR-MCNC: 105 MG/DL (ref 70–130)
GLUCOSE BLDC GLUCOMTR-MCNC: 110 MG/DL (ref 70–130)
GLUCOSE BLDC GLUCOMTR-MCNC: 95 MG/DL (ref 70–130)
HCT VFR BLD AUTO: 40 % (ref 35.6–45.5)
HGB BLD-MCNC: 12.6 G/DL (ref 11.9–15.5)
IMM GRANULOCYTES # BLD: 0.05 10*3/MM3 (ref 0–0.03)
IMM GRANULOCYTES NFR BLD: 0.4 % (ref 0–0.5)
LYMPHOCYTES # BLD AUTO: 2.03 10*3/MM3 (ref 0.9–4.8)
LYMPHOCYTES NFR BLD AUTO: 16 % (ref 19.6–45.3)
MCH RBC QN AUTO: 28.8 PG (ref 26.9–32)
MCHC RBC AUTO-ENTMCNC: 31.5 G/DL (ref 32.4–36.3)
MCV RBC AUTO: 91.5 FL (ref 80.5–98.2)
MONOCYTES # BLD AUTO: 1.3 10*3/MM3 (ref 0.2–1.2)
MONOCYTES NFR BLD AUTO: 10.2 % (ref 5–12)
NEUTROPHILS # BLD AUTO: 9.15 10*3/MM3 (ref 1.9–8.1)
NEUTROPHILS NFR BLD AUTO: 72 % (ref 42.7–76)
PLATELET # BLD AUTO: 419 10*3/MM3 (ref 140–500)
PMV BLD AUTO: 9.8 FL (ref 6–12)
POTASSIUM BLD-SCNC: 4.1 MMOL/L (ref 3.5–5.2)
RBC # BLD AUTO: 4.37 10*6/MM3 (ref 3.9–5.2)
SODIUM BLD-SCNC: 140 MMOL/L (ref 136–145)
WBC NRBC COR # BLD: 12.71 10*3/MM3 (ref 4.5–10.7)

## 2018-05-05 PROCEDURE — 82962 GLUCOSE BLOOD TEST: CPT

## 2018-05-05 PROCEDURE — 80048 BASIC METABOLIC PNL TOTAL CA: CPT | Performed by: HOSPITALIST

## 2018-05-05 PROCEDURE — 85025 COMPLETE CBC W/AUTO DIFF WBC: CPT | Performed by: HOSPITALIST

## 2018-05-05 PROCEDURE — 97110 THERAPEUTIC EXERCISES: CPT

## 2018-05-05 RX ADMIN — VENLAFAXINE HYDROCHLORIDE 75 MG: 75 TABLET ORAL at 12:13

## 2018-05-05 RX ADMIN — Medication 1 TABLET: at 12:14

## 2018-05-05 RX ADMIN — ATORVASTATIN CALCIUM 10 MG: 10 TABLET, FILM COATED ORAL at 12:13

## 2018-05-05 RX ADMIN — ASPIRIN 81 MG: 81 TABLET, CHEWABLE ORAL at 12:13

## 2018-05-05 RX ADMIN — TIMOLOL MALEATE 1 DROP: 5 SOLUTION/ DROPS OPHTHALMIC at 12:15

## 2018-05-05 RX ADMIN — LEVOTHYROXINE SODIUM 25 MCG: 25 TABLET ORAL at 12:14

## 2018-05-05 RX ADMIN — LISINOPRIL 10 MG: 10 TABLET ORAL at 12:13

## 2018-05-05 RX ADMIN — AMLODIPINE BESYLATE 10 MG: 10 TABLET ORAL at 12:13

## 2018-05-05 RX ADMIN — PANTOPRAZOLE SODIUM 40 MG: 40 TABLET, DELAYED RELEASE ORAL at 12:15

## 2018-05-05 NOTE — PLAN OF CARE
Problem: Patient Care Overview  Goal: Plan of Care Review  Outcome: Ongoing (interventions implemented as appropriate)   05/05/18 0318   Coping/Psychosocial   Plan of Care Reviewed With patient   Plan of Care Review   Progress improving   OTHER   Outcome Summary pt much more alert and oriented this shift. denies pain. cooperative. able to communicate needs. up with minimal assist to bsc. will continue to monitor.      Goal: Individualization and Mutuality  Outcome: Ongoing (interventions implemented as appropriate)    Goal: Discharge Needs Assessment  Outcome: Ongoing (interventions implemented as appropriate)      Problem: Skin Injury Risk (Adult)  Goal: Identify Related Risk Factors and Signs and Symptoms  Outcome: Outcome(s) achieved Date Met: 05/05/18    Goal: Skin Health and Integrity  Outcome: Ongoing (interventions implemented as appropriate)      Problem: Confusion, Acute (Adult)  Goal: Identify Related Risk Factors and Signs and Symptoms  Outcome: Outcome(s) achieved Date Met: 05/05/18    Goal: Cognitive/Functional Impairments Minimized  Outcome: Ongoing (interventions implemented as appropriate)    Goal: Safety  Outcome: Ongoing (interventions implemented as appropriate)      Problem: Renal Failure/Kidney Injury, Acute (Adult)  Goal: Signs and Symptoms of Listed Potential Problems Will be Absent, Minimized or Managed (Renal Failure/Kidney Injury, Acute)  Outcome: Ongoing (interventions implemented as appropriate)      Problem: Fall Risk (Adult)  Goal: Identify Related Risk Factors and Signs and Symptoms  Outcome: Outcome(s) achieved Date Met: 05/05/18    Goal: Absence of Fall  Outcome: Ongoing (interventions implemented as appropriate)

## 2018-05-05 NOTE — PLAN OF CARE
Problem: Patient Care Overview  Goal: Plan of Care Review  Outcome: Ongoing (interventions implemented as appropriate)   05/05/18 1240   Coping/Psychosocial   Plan of Care Reviewed With patient   OTHER   Outcome Summary Pt able to ambulate w/ PT. Posterior lean upon standing. Almost no verbalization w/ PT, but would nod yes or no inconsistently.

## 2018-05-05 NOTE — THERAPY TREATMENT NOTE
Acute Care - Physical Therapy Treatment Note   [facility]     Patient Name: Ivett Lacy  : 1932  MRN: [id number]  Today's Date: 2018  Onset of Illness/Injury or Date of Surgery: 18     Referring Physician: Nimo    Admit Date: 2018    Visit Dx:    ICD-10-CM ICD-9-CM   1. Acute renal failure, unspecified acute renal failure type N17.9 584.9   2. Acute metabolic encephalopathy G93.41 348.31   3. Decreased mobility R26.89 781.99     Patient Active Problem List   Diagnosis   • Acute renal failure       Therapy Treatment          Rehabilitation Treatment Summary     Row Name 18 1155             Treatment Time/Intention    Discipline physical therapy assistant  -RW      Document Type therapy note (daily note)  -RW      Subjective Information no complaints  -RW      Patient Effort good  -RW      Comment Very little verbalization  -RW      Existing Precautions/Restrictions fall  -RW      Recorded by [RW] Maria Guadalupe Mendez, PTA 18 1248 18 1248      Row Name 18 1155             Cognitive Assessment/Intervention- PT/OT    Affect/Mental Status (Cognitive) flat/blunted affect  -RW      Orientation Status (Cognition) oriented to;person  -RW      Follows Commands (Cognition) follows one step commands;75-90% accuracy;verbal cues/prompting required;physical/tactile prompts required  -RW      Personal Safety Interventions fall prevention program maintained;gait belt;nonskid shoes/slippers when out of bed  -RW      Recorded by [RW] Maria Guadalupe Mendez, PTA 18 1248 18 1248      Row Name 18 1155             Safety Issues, Functional Mobility    Safety Issues Affecting Function (Mobility) safety precaution awareness  -RW      Impairments Affecting Function (Mobility) balance;cognition;endurance/activity tolerance;strength  -RW      Recorded by [RW] Maria Guadalupe Mendez, VERÓNICA 18 1248 18 1248      Row Name 18 1155             Bed Mobility Assessment/Treatment    Bed  Mobility Assessment/Treatment supine-sit;sit-supine  -RW      Supine-Sit Pratt (Bed Mobility) minimum assist (75% patient effort);verbal cues;nonverbal cues (demo/gesture)  -RW      Sit-Supine Pratt (Bed Mobility) not tested   Pt up in chair  -RW      Assistive Device (Bed Mobility) bed rails;head of bed elevated  -RW      Comment (Bed Mobility) Cueing to participate and gett OOB  -RW      Recorded by [RW] Maria Guadalupe Mendez, PTA 05/05/18 1248 05/05/18 1248      Row Name 05/05/18 1155             Transfer Assessment/Treatment    Transfer Assessment/Treatment sit-stand transfer;stand-sit transfer;toilet transfer  -RW      Comment (Transfers) Pt assisted to [facility] prior to ambulating in [place]. Posterior lean upon standing  -RW      Sit-Stand Pratt (Transfers) minimum assist (75% patient effort);2 person assist;verbal cues;nonverbal cues (demo/gesture)  -RW      Stand-Sit Pratt (Transfers) minimum assist (75% patient effort);verbal cues  -RW      Pratt Level (Toilet Transfer) minimum assist (75% patient effort);verbal cues;nonverbal cues (demo/gesture)  -RW      Assistive Device (Toilet Transfer) commode, 3-in-1;walker, front-wheeled  -RW      Recorded by [RW] Maria Guadalupe Mendez, PTA 05/05/18 1248 05/05/18 1248      Row Name 05/05/18 1155             Sit-Stand Transfer    Assistive Device (Sit-Stand Transfers) walker, front-wheeled  -RW      Recorded by [RW] Maria Guadalupe Mendez, PTA 05/05/18 1248 05/05/18 1248      Row Name 05/05/18 1155             Stand-Sit Transfer    Assistive Device (Stand-Sit Transfers) walker, front-wheeled  -RW      Recorded by [RW] Maria Guadalupe Mendez, PTA 05/05/18 1248 05/05/18 1248      Row Name 05/05/18 1155             Toilet Transfer    Type (Toilet Transfer) sit-stand;stand-sit  -RW      Recorded by [RW] Maria Guadalupe Mendez, PTA 05/05/18 1248 05/05/18 1248      Row Name 05/05/18 1155             Gait/Stairs Assessment/Training    Pratt Level (Gait) contact guard;1  person assist;1 person to manage equipment;verbal cues  -RW      Assistive Device (Gait) walker, front-wheeled  -RW      Distance in Feet (Gait) 165  -RW      Pattern (Gait) step-through  -RW      Deviations/Abnormal Patterns (Gait) base of support, narrow;mando decreased;gait speed decreased;stride length decreased  -RW      Recorded by [RW] Maria Guadalupe EGAN Andrea, [facility] 05/05/18 1248 05/05/18 1248      Row Name 05/05/18 1155             Positioning and Restraints    Pre-Treatment Position in bed  -RW      Post Treatment Position chair  -RW      In Chair sitting;call light within reach;encouraged to call for assist;exit alarm on;with family/caregiver  -RW      Recorded by [RW] Maria Guadalupe Mendez, [facility] 05/05/18 1248 05/05/18 1248      Row Name 05/05/18 1155             Pain Scale: Numbers Pre/Post-Treatment    Pain Scale: Numbers, Pretreatment 0/10 - no pain  -RW      Recorded by [RW] Maria Guadalupe JIGNESH Andrea, [facility] 05/05/18 1248 05/05/18 1248      Row Name 05/05/18 1155             Outcome Summary/Treatment Plan (PT)    Anticipated Discharge Disposition (PT) home with home health care  -RW      Recorded by [RW] Maria Guadalupe L Andrea, [facility] 05/05/18 1248 05/05/18 1248        User Key  (r) = Recorded By, (t) = Taken By, (c) = Cosigned By    Initials Name Effective Dates Discipline     Maria Guadalupe Mendez, [facility] 03/07/18 -  PT                     Physical Therapy Education     Title: PT OT SLP Therapies (Active)     Topic: Physical Therapy (Active)     Point: Mobility training (Active)    Learning Progress Summary     Learner Status Readiness Method Response Comment Documented by    Patient Active Acceptance E,D NR   05/05/18 1244     Active Acceptance E,TB NR   05/04/18 1015          Point: Body mechanics (Active)    Learning Progress Summary     Learner Status Readiness Method Response Comment Documented by    Patient Active Acceptance E,D NR   05/05/18 1244     Active Acceptance E,TB NR  EE 05/04/18 1015          Point: Precautions (Active)     Learning Progress Summary     Learner Status Readiness Method Response Comment Documented by    Patient Active Acceptance E,D NR   05/05/18 1244                      User Key     Initials Effective Dates Name Provider Type Discipline    EE 04/03/18 -  Ana Bradley, PT Physical Therapist PT     03/07/18 -  Maria Guadalupe Mendez PTA Physical Therapy Assistant PT                    PT Recommendation and Plan  Anticipated Discharge Disposition (PT): home with home health care  Outcome Summary/Treatment Plan (PT)  Anticipated Discharge Disposition (PT): home with home health care  Plan of Care Reviewed With: patient  Outcome Summary: Pt able to ambulate w/ PT. Posterior lean upon standing. Almost no verbalization w/ PT, but would nod yes or no inconsistently.          Outcome Measures     Row Name 05/05/18 1155 05/04/18 1354 05/04/18 1000       How much help from another person do you currently need...    Turning from your back to your side while in flat bed without using bedrails? 3  -RW  -- 3  -EE    Moving from lying on back to sitting on the side of a flat bed without bedrails? 3  -RW  -- 3  -EE    Moving to and from a bed to a chair (including a wheelchair)? 3  -RW  -- 3  -EE    Standing up from a chair using your arms (e.g., wheelchair, bedside chair)? 3  -RW  -- 3  -EE    Climbing 3-5 steps with a railing? 2  -RW  -- 2  -EE    To walk in hospital room? 3  -RW  -- 3  -EE    AM-PAC 6 Clicks Score 17  -RW  -- 17  -EE       How much help from another is currently needed...    Putting on and taking off regular lower body clothing?  -- 1  -SG  --    Bathing (including washing, rinsing, and drying)  -- 1  -SG  --    Toileting (which includes using toilet bed pan or urinal)  -- 1  -SG  --    Putting on and taking off regular upper body clothing  -- 1  -SG  --    Taking care of personal grooming (such as brushing teeth)  -- 1  -SG  --    Eating meals  -- 2  -SG  --    Score  -- 7  -SG  --       Functional Assessment     Outcome Measure Options AM-PAC 6 Clicks Basic Mobility (PT)  -RW AM-PAC 6 Clicks Daily Activity (OT)  -SG AM-PAC 6 Clicks Basic Mobility (PT)  -EE      User Key  (r) = Recorded By, (t) = Taken By, (c) = Cosigned By    Initials Name Provider Type    SG Daisy Payton, OTR Occupational Therapist    EE Ana Bradley, PT Physical Therapist    ALEM Mendez PTA Physical Therapy Assistant           Time Calculation:         PT Charges     Row Name 05/05/18 1208             Time Calculation    Start Time 1144  -RW      Stop Time 1200  -RW      Time Calculation (min) 16 min  -RW      PT Received On 05/05/18  -RW      PT - Next Appointment 05/06/18  -RW        User Key  (r) = Recorded By, (t) = Taken By, (c) = Cosigned By    Initials Name Provider Type     Maria Guadalupe Mendez PTA Physical Therapy Assistant          Therapy Charges for Today     Code Description Service Date Service Provider Modifiers Qty    53128794232 HC PT THER PROC EA 15 MIN 5/5/2018 Maria Guadalupe Mendez PTA GP 1    12535667352 HC PT THER SUPP EA 15 MIN 5/5/2018 Maria Guadalupe Mendez PTA GP 1          PT G-Codes  Outcome Measure Options: AM-PAC 6 Clicks Basic Mobility (PT)    Maria Guadalupe Mendez PTA  5/5/2018

## 2018-05-05 NOTE — PROGRESS NOTES
: the pt. Feel better, the chart reviewed    Vital Signs  Vitals:    05/05/18 1342   BP: 164/95   Pulse: 89   Resp: 16   Temp: 97.4 °F (36.3 °C)   SpO2: 98%     I/O this shift:  In: 120 [P.O.:120]  Out: -   I/O last 3 completed shifts:  In: 1200 [P.O.:200; I.V.:1000]  Out: 701 [Urine:700; Stool:1]      Physical Exam:    General:   HEENT:  Normo cephalic, Atraumatic, EOMI, PERRLA, NO icterus,  Neck:  Supple, No JVD, No Carotid artery bruit, No lymphadenopathy  Chest: Clear to auscultation bilaterally,   Cardiovascular: Regular rate and rhythm. Positive S1 & S2, No rub, murmur or gallop.  Abdominal: Soft, non tender, no palpable organomegaly, no abdominal bruit, positive bowel sounds  Musculoskeletal: No joint tenderness or swelling, good range of motion, Adequate muscle strength on both sides, no cyanosis, clubbing or edema on lower extremities.  Neuro: Alert oriented x3, CN1 - 12 intact, No focal sensory or motor deficit.      Review of Systems: can not be obtained , pt. Quiet and confused  Current Labs  [unfilled]  reviewed  Current Radiology    Current Meds    Current Facility-Administered Medications:   •  amLODIPine (NORVASC) tablet 10 mg, 10 mg, Oral, Daily, Misha Suero MD, 10 mg at 05/05/18 1213  •  ARIPiprazole (ABILIFY) tablet 5 mg, 5 mg, Oral, Nightly, Misha Suero MD  •  aspirin chewable tablet 81 mg, 81 mg, Oral, Daily, Misha Suero MD, 81 mg at 05/05/18 1213  •  atorvastatin (LIPITOR) tablet 10 mg, 10 mg, Oral, Daily, Misha Suero MD, 10 mg at 05/05/18 1213  •  insulin aspart (novoLOG) injection 0-7 Units, 0-7 Units, Subcutaneous, 4x Daily With Meals & Nightly, Misha Suero MD  •  latanoprost (XALATAN) 0.005 % ophthalmic solution 1 drop, 1 drop, Both Eyes, Nightly, Misha Suero MD, 1 drop at 05/04/18 2041  •  levothyroxine (SYNTHROID, LEVOTHROID) tablet 25 mcg, 25 mcg, Oral, Misha VILLEGAS MD, 25 mcg at 05/05/18 1214  •  lisinopril (PRINIVIL,ZESTRIL) tablet 10 mg, 10 mg, Oral, Q24H, Christopher Moore  MD Jonny, 10 mg at 05/05/18 1213  •  multivitamin (THERAGRAN) tablet 1 tablet, 1 tablet, Oral, Daily, Misha Suero MD, 1 tablet at 05/05/18 1214  •  pantoprazole (PROTONIX) EC tablet 40 mg, 40 mg, Oral, QAM, Misha Suero MD, 40 mg at 05/05/18 1215  •  sodium chloride 0.9 % flush 10 mL, 10 mL, Intravenous, PRN, Obinna Fairbanks MD  •  sodium chloride 0.9 % with KCl 20 mEq/L infusion, 50 mL/hr, Intravenous, Continuous, Christopher Fuller MD, Last Rate: 50 mL/hr at 05/04/18 1549, 50 mL/hr at 05/04/18 1549  •  timolol (TIMOPTIC) 0.5 % ophthalmic solution 1 drop, 1 drop, Both Eyes, Daily, Misha Suero MD, 1 drop at 05/05/18 1215  •  venlafaxine (EFFEXOR) tablet 75 mg, 75 mg, Oral, Daily, Misha Suero MD, 75 mg at 05/05/18 1213              Patient Active Problem List   Diagnosis   • Acute renal failure   acute kidney injury resolving, continue I/v fluids for today        Scott Bryant MD FACP, FASN.  05/05/18  1:56 PM

## 2018-05-05 NOTE — PROGRESS NOTES
"DAILY PROGRESS NOTE  [facility]    Patient Identification:  Name: Ivett Lacy  Age: 86 y.o.  Sex: female  :  1932  MRN: [id number]         Primary Care Physician: Madelaine Parisi MD    Subjective: patient is confused; has been somewhat paranoid and not answering questions; family is at the bedside  Interval History: follow up for anxiety disorder, diabetes, hypertension, mood disorder, acute renal failure, ckd    Objective:    Scheduled Meds:  amLODIPine 10 mg Oral Daily   ARIPiprazole 5 mg Oral Nightly   aspirin 81 mg Oral Daily   atorvastatin 10 mg Oral Daily   insulin aspart 0-7 Units Subcutaneous 4x Daily With Meals & Nightly   latanoprost 1 drop Both Eyes Nightly   levothyroxine 25 mcg Oral QAM   lisinopril 10 mg Oral Q24H   multivitamin 1 tablet Oral Daily   pantoprazole 40 mg Oral QAM   timolol 1 drop Both Eyes Daily   venlafaxine 75 mg Oral Daily     Continuous Infusions:  sodium chloride 0.9 % with KCl 20 mEq 50 mL/hr Last Rate: 50 mL/hr (18 1549)       Vital signs in last 24 hours:  Temp:  [97.5 °F (36.4 °C)-98.9 °F (37.2 °C)] 98.9 °F (37.2 °C)  Heart Rate:  [80-91] 91  Resp:  [14-16] 14  BP: (159-172)/(86-93) 166/92    Intake/Output:    Intake/Output Summary (Last 24 hours) at 18 1225  Last data filed at 18 0222   Gross per 24 hour   Intake              200 ml   Output              700 ml   Net             -500 ml       Exam:  /92 (BP Location: Left arm, Patient Position: Lying)   Pulse 91   Temp 98.9 °F (37.2 °C) (Oral)   Resp 14   Ht 157.5 cm (62\")   Wt 55.8 kg (123 lb)   SpO2 97%   BMI 22.50 kg/m²     General Appearance:    Alert, cooperative, no distress, AAOx3                          Head:    Normocephalic, without obvious abnormality, bitemporal wastng                           Eyes:    PERRL, conjunctiva/corneas clear, EOM's intact, both eyes                         Throat:   Lips, tongue, gums normal; oral mucosa pink and moist              "              Neck:   Supple, symmetrical, trachea midline, no JVD                         Lungs:    Decreased breath sounds bilaterally, respirations unlabored                 Chest Wall:    No tenderness or deformity                          Heart:    Regular rate and rhythm, S1 and S2 normal, no murmur,no  Rub                                      or gallop                  Abdomen:     Soft, non-tender, bowel sounds active, no masses, no                                                        organomegaly                  Extremities:   Extremities normal, atraumatic, no cyanosis or edema                        Pulses:   Pulses palpable in all extremities                            Skin:   Skin is warm and dry,  no rashes or palpable lesions                Data Review:  Lab Results   Component Value Date    WBC 12.71 (H) 05/05/2018    HGB 12.6 05/05/2018    HCT 40.0 05/05/2018     05/05/2018     Lab Results   Component Value Date     05/05/2018    K 4.1 05/05/2018     (H) 05/05/2018    CO2 17.2 (L) 05/05/2018    BUN 21 05/05/2018    CREATININE 0.90 05/05/2018    GLUCOSE 100 (H) 05/05/2018     Lab Results   Component Value Date    CALCIUM 9.2 05/05/2018    MG 2.5 (H) 05/02/2018    PHOS 1.9 (L) 05/04/2018     Lab Results   Component Value Date    AST 27 05/04/2018    ALT 16 05/04/2018    ALKPHOS 88 05/04/2018     Patient Active Problem List   Diagnosis Code   • Acute renal failure N17.9       Assessment:  Active Problems:    Acute renal failure  metabolic acidosis  Mood disorder  hypertension    Plan:  Will continue fluids for now  Still with mild azotemia  Continues to be paranoid  Still with metabolic acidosis  Appreciate input from nephrology  Assisted living with home health is planned once ready for discharge    Verena Topete MD  5/5/2018  12:25 PM

## 2018-05-06 LAB
ANION GAP SERPL CALCULATED.3IONS-SCNC: 14.9 MMOL/L
BUN BLD-MCNC: 22 MG/DL (ref 8–23)
BUN/CREAT SERPL: 25.6 (ref 7–25)
CALCIUM SPEC-SCNC: 9.8 MG/DL (ref 8.6–10.5)
CHLORIDE SERPL-SCNC: 106 MMOL/L (ref 98–107)
CO2 SERPL-SCNC: 20.1 MMOL/L (ref 22–29)
CREAT BLD-MCNC: 0.86 MG/DL (ref 0.57–1)
DEPRECATED RDW RBC AUTO: 45.5 FL (ref 37–54)
ERYTHROCYTE [DISTWIDTH] IN BLOOD BY AUTOMATED COUNT: 14 % (ref 11.7–13)
GFR SERPL CREATININE-BSD FRML MDRD: 63 ML/MIN/1.73
GLUCOSE BLD-MCNC: 109 MG/DL (ref 65–99)
GLUCOSE BLDC GLUCOMTR-MCNC: 100 MG/DL (ref 70–130)
GLUCOSE BLDC GLUCOMTR-MCNC: 113 MG/DL (ref 70–130)
GLUCOSE BLDC GLUCOMTR-MCNC: 134 MG/DL (ref 70–130)
GLUCOSE BLDC GLUCOMTR-MCNC: 94 MG/DL (ref 70–130)
HCT VFR BLD AUTO: 40.1 % (ref 35.6–45.5)
HGB BLD-MCNC: 13.1 G/DL (ref 11.9–15.5)
MAGNESIUM SERPL-MCNC: 2 MG/DL (ref 1.6–2.4)
MCH RBC QN AUTO: 28.9 PG (ref 26.9–32)
MCHC RBC AUTO-ENTMCNC: 32.7 G/DL (ref 32.4–36.3)
MCV RBC AUTO: 88.5 FL (ref 80.5–98.2)
PLATELET # BLD AUTO: 478 10*3/MM3 (ref 140–500)
PMV BLD AUTO: 10 FL (ref 6–12)
POTASSIUM BLD-SCNC: 4.2 MMOL/L (ref 3.5–5.2)
RBC # BLD AUTO: 4.53 10*6/MM3 (ref 3.9–5.2)
SODIUM BLD-SCNC: 141 MMOL/L (ref 136–145)
WBC NRBC COR # BLD: 11.69 10*3/MM3 (ref 4.5–10.7)

## 2018-05-06 PROCEDURE — 97110 THERAPEUTIC EXERCISES: CPT

## 2018-05-06 PROCEDURE — 80048 BASIC METABOLIC PNL TOTAL CA: CPT | Performed by: INTERNAL MEDICINE

## 2018-05-06 PROCEDURE — 85027 COMPLETE CBC AUTOMATED: CPT | Performed by: INTERNAL MEDICINE

## 2018-05-06 PROCEDURE — 82962 GLUCOSE BLOOD TEST: CPT

## 2018-05-06 PROCEDURE — 25810000003 SODIUM CHLORIDE 0.9 % WITH KCL 20 MEQ 20-0.9 MEQ/L-% SOLUTION: Performed by: INTERNAL MEDICINE

## 2018-05-06 PROCEDURE — 83735 ASSAY OF MAGNESIUM: CPT | Performed by: INTERNAL MEDICINE

## 2018-05-06 RX ORDER — SODIUM BICARBONATE 650 MG/1
650 TABLET ORAL 2 TIMES DAILY
Status: DISCONTINUED | OUTPATIENT
Start: 2018-05-06 | End: 2018-05-07

## 2018-05-06 RX ORDER — LISINOPRIL 20 MG/1
20 TABLET ORAL
Status: DISCONTINUED | OUTPATIENT
Start: 2018-05-07 | End: 2018-05-09 | Stop reason: HOSPADM

## 2018-05-06 RX ORDER — QUETIAPINE FUMARATE 25 MG/1
25 TABLET, FILM COATED ORAL 4 TIMES DAILY PRN
Status: DISCONTINUED | OUTPATIENT
Start: 2018-05-06 | End: 2018-05-09 | Stop reason: HOSPADM

## 2018-05-06 RX ADMIN — AMLODIPINE BESYLATE 10 MG: 10 TABLET ORAL at 10:06

## 2018-05-06 RX ADMIN — LEVOTHYROXINE SODIUM 25 MCG: 25 TABLET ORAL at 10:06

## 2018-05-06 RX ADMIN — VENLAFAXINE HYDROCHLORIDE 75 MG: 75 TABLET ORAL at 10:05

## 2018-05-06 RX ADMIN — LISINOPRIL 10 MG: 10 TABLET ORAL at 10:06

## 2018-05-06 RX ADMIN — PANTOPRAZOLE SODIUM 40 MG: 40 TABLET, DELAYED RELEASE ORAL at 10:05

## 2018-05-06 RX ADMIN — ATORVASTATIN CALCIUM 10 MG: 10 TABLET, FILM COATED ORAL at 10:06

## 2018-05-06 RX ADMIN — SODIUM BICARBONATE 650 MG: 650 TABLET ORAL at 21:29

## 2018-05-06 RX ADMIN — POTASSIUM CHLORIDE AND SODIUM CHLORIDE 50 ML/HR: 900; 150 INJECTION, SOLUTION INTRAVENOUS at 02:39

## 2018-05-06 RX ADMIN — TIMOLOL MALEATE 1 DROP: 5 SOLUTION/ DROPS OPHTHALMIC at 10:07

## 2018-05-06 RX ADMIN — ASPIRIN 81 MG: 81 TABLET, CHEWABLE ORAL at 10:06

## 2018-05-06 RX ADMIN — LATANOPROST 1 DROP: 50 SOLUTION OPHTHALMIC at 21:30

## 2018-05-06 RX ADMIN — Medication 1 TABLET: at 10:07

## 2018-05-06 NOTE — PLAN OF CARE
Problem: Patient Care Overview  Goal: Plan of Care Review  Outcome: Ongoing (interventions implemented as appropriate)   05/06/18 0356   Coping/Psychosocial   Plan of Care Reviewed With patient   Plan of Care Review   Progress no change   OTHER   Outcome Summary pt resting in bed quietly. nonverbal this shift. nods yes or no inconsistently. will continue to monitor.      Goal: Individualization and Mutuality  Outcome: Ongoing (interventions implemented as appropriate)    Goal: Discharge Needs Assessment  Outcome: Ongoing (interventions implemented as appropriate)      Problem: Skin Injury Risk (Adult)  Goal: Skin Health and Integrity  Outcome: Ongoing (interventions implemented as appropriate)      Problem: Confusion, Acute (Adult)  Goal: Cognitive/Functional Impairments Minimized  Outcome: Ongoing (interventions implemented as appropriate)    Goal: Safety  Outcome: Ongoing (interventions implemented as appropriate)      Problem: Renal Failure/Kidney Injury, Acute (Adult)  Goal: Signs and Symptoms of Listed Potential Problems Will be Absent, Minimized or Managed (Renal Failure/Kidney Injury, Acute)  Outcome: Ongoing (interventions implemented as appropriate)      Problem: Fall Risk (Adult)  Goal: Absence of Fall  Outcome: Ongoing (interventions implemented as appropriate)

## 2018-05-06 NOTE — PLAN OF CARE
Problem: Patient Care Overview  Goal: Plan of Care Review  Outcome: Ongoing (interventions implemented as appropriate)   05/06/18 1111   Coping/Psychosocial   Plan of Care Reviewed With patient   OTHER   Outcome Summary Pt tolerated PT well. Still not verbalizing, but would nod yes or no more today. Encouragement to participate w/ PT from family.

## 2018-05-06 NOTE — PROGRESS NOTES
"DAILY PROGRESS NOTE  [facility]    Patient Identification:  Name: Ivett Lacy  Age: 86 y.o.  Sex: female  :  1932  MRN: [id number]         Primary Care Physician: Madelaine Parisi MD    Subjective: patient is sitting up; still paranoid;   Interval History: follow up for hypertension, hypothyroidism, glaucoma, diabees    Objective:    Scheduled Meds:  amLODIPine 10 mg Oral Daily   ARIPiprazole 5 mg Oral Nightly   aspirin 81 mg Oral Daily   atorvastatin 10 mg Oral Daily   insulin aspart 0-7 Units Subcutaneous 4x Daily With Meals & Nightly   latanoprost 1 drop Both Eyes Nightly   levothyroxine 25 mcg Oral QAM   lisinopril 10 mg Oral Q24H   multivitamin 1 tablet Oral Daily   pantoprazole 40 mg Oral QAM   sodium bicarbonate 650 mg Oral BID   timolol 1 drop Both Eyes Daily   venlafaxine 75 mg Oral Daily     Continuous Infusions:     Vital signs in last 24 hours:  Temp:  [97.4 °F (36.3 °C)-97.8 °F (36.6 °C)] 97.6 °F (36.4 °C)  Heart Rate:  [65-89] 65  Resp:  [14-16] 14  BP: (164-182)/(89-95) 182/93    Intake/Output:    Intake/Output Summary (Last 24 hours) at 18 1234  Last data filed at 18 1103   Gross per 24 hour   Intake                0 ml   Output              750 ml   Net             -750 ml       Exam:  BP (!) 182/93 (BP Location: Left arm, Patient Position: Lying)   Pulse 65   Temp 97.6 °F (36.4 °C) (Oral)   Resp 14   Ht 157.5 cm (62\")   Wt 55.8 kg (123 lb)   SpO2 97%   BMI 22.50 kg/m²     General Appearance:    Alert, cooperative, no distress                           Head:    Normocephalic, without obvious abnormality, bitemporal wasting                           Eyes:    PERRL, conjunctiva/corneas clear, EOM's intact, both eyes                         Throat:   Lips, tongue, gums normal; oral mucosa pink and moist                           Neck:   Supple, symmetrical, trachea midline, no JVD                         Lungs:    Decreased breath sounds bilaterally, " respirations unlabored                 Chest Wall:    No tenderness or deformity                          Heart:    Regular rate and rhythm, S1 and S2 normal, no murmur,no  Rub                                      or gallop                  Abdomen:     Soft, non-tender, bowel sounds active, no masses, no                                                        organomegaly                  Extremities:   Extremities normal, atraumatic, no cyanosis or edema                        Pulses:   Pulses palpable in all extremities                            Skin:   Skin is warm and dry,  no rashes or palpable lesions                      Data Review:  Lab Results   Component Value Date    WBC 11.69 (H) 05/06/2018    HGB 13.1 05/06/2018    HCT 40.1 05/06/2018     05/06/2018     Lab Results   Component Value Date     05/06/2018    K 4.2 05/06/2018     05/06/2018    CO2 20.1 (L) 05/06/2018    BUN 22 05/06/2018    CREATININE 0.86 05/06/2018    GLUCOSE 109 (H) 05/06/2018     Lab Results   Component Value Date    CALCIUM 9.8 05/06/2018    MG 2.0 05/06/2018    PHOS 1.9 (L) 05/04/2018     Lab Results   Component Value Date    AST 27 05/04/2018    ALT 16 05/04/2018    ALKPHOS 88 05/04/2018     Patient Active Problem List   Diagnosis Code   • Acute renal failure N17.9       Assessment:  Active Problems:    Acute renal failure  leukocytosis  Hyperglycemia  Hypertension      Plan:  Will increase lisinopril due to high blood pressure  She did not take abilify  Change to seroquel instead  Assisted living is coming for an evaluation tomorrow  Plan is to go there with home health to follow  Verena Topete MD  5/6/2018  12:34 PM

## 2018-05-06 NOTE — THERAPY TREATMENT NOTE
Acute Care - Physical Therapy Treatment Note   [facility]     Patient Name: Ivett Lacy  : 1932  MRN: [id number]  Today's Date: 2018  Onset of Illness/Injury or Date of Surgery: 18     Referring Physician: Nimo    Admit Date: 2018    Visit Dx:    ICD-10-CM ICD-9-CM   1. Acute renal failure, unspecified acute renal failure type N17.9 584.9   2. Acute metabolic encephalopathy G93.41 348.31   3. Decreased mobility R26.89 781.99     Patient Active Problem List   Diagnosis   • Acute renal failure       Therapy Treatment          Rehabilitation Treatment Summary     Row Name 18 1044             Treatment Time/Intention    Discipline physical therapy assistant  -RW      Document Type therapy note (daily note)  -RW      Subjective Information no complaints  -RW      Patient Effort good  -RW      Existing Precautions/Restrictions fall  -RW      Recorded by [RW] Maria Guadalupe Mendez, VERÓNICA 18 1049 18 1049      Row Name 18 1044             Cognitive Assessment/Intervention- PT/OT    Affect/Mental Status (Cognitive) flat/blunted affect  -RW      Orientation Status (Cognition) oriented to;person  -RW      Follows Commands (Cognition) follows one step commands;75-90% accuracy;verbal cues/prompting required;physical/tactile prompts required  -RW      Personal Safety Interventions fall prevention program maintained;gait belt;nonskid shoes/slippers when out of bed  -RW      Recorded by [RW] Maria Guadalupe Mendez, VERÓNICA 18 1114 18 1114      Row Name 18 1044             Safety Issues, Functional Mobility    Safety Issues Affecting Function (Mobility) at risk behavior observed;awareness of need for assistance;safety precaution awareness  -RW      Impairments Affecting Function (Mobility) balance;coordination;endurance/activity tolerance;strength  -RW      Recorded by [RW] Maria Guadalupe Mendez, VERÓNICA 18 1114 18 1114      Row Name 18 1044             Bed Mobility  Assessment/Treatment    Supine-Sit Elliott (Bed Mobility) not tested  -RW      Sit-Supine Elliott (Bed Mobility) not tested  -RW      Comment (Bed Mobility) Pt up in chair  -RW      Recorded by [RW] Maria Guadalupe Mendez, [facility] 05/06/18 1114 05/06/18 1114      Row Name 05/06/18 1044             Transfer Assessment/Treatment    Transfer Assessment/Treatment sit-stand transfer;stand-sit transfer;toilet transfer  -RW      Comment (Transfers) Cueing required for hand placement  -RW      Sit-Stand Elliott (Transfers) contact guard;verbal cues  -RW      Stand-Sit Elliott (Transfers) contact guard;verbal cues  -RW      Elliott Level (Toilet Transfer) contact guard;verbal cues  -RW      Assistive Device (Toilet Transfer) commode, 3-in-1;walker, front-wheeled  -RW      Recorded by [RW] Maria Gaudalupe Mendez, VERÓNICA 05/06/18 1114 05/06/18 1114      Row Name 05/06/18 1044             Sit-Stand Transfer    Assistive Device (Sit-Stand Transfers) walker, front-wheeled  -RW      Recorded by [RW] Maria Guadalupe Mendez, VERÓNICA 05/06/18 1114 05/06/18 1114      Row Name 05/06/18 1044             Stand-Sit Transfer    Assistive Device (Stand-Sit Transfers) walker, front-wheeled  -RW      Recorded by [RW] Maria Guadalupe Mendez, [facility] 05/06/18 1114 05/06/18 1114      Row Name 05/06/18 1044             Toilet Transfer    Type (Toilet Transfer) sit-stand;stand-sit  -RW      Recorded by [RW] Maria Guadalupe Mendez, VERÓNICA 05/06/18 1114 05/06/18 1114      Row Name 05/06/18 1044             Gait/Stairs Assessment/Training    Elliott Level (Gait) contact guard;1 person assist;1 person to manage equipment;verbal cues  -RW      Assistive Device (Gait) walker, front-wheeled  -RW      Distance in Feet (Gait) 165  -RW      Pattern (Gait) step-through  -RW      Deviations/Abnormal Patterns (Gait) base of support, narrow;mando decreased;gait speed decreased;stride length decreased  -RW      Bilateral Gait Deviations heel strike decreased  -RW      Comment  (Gait/Stairs) verbal cueing for RW guidance today  -RW      Recorded by [RW] Maria Guadalupe Mendez, PTA 05/06/18 1114 05/06/18 1114      Row Name 05/06/18 1044             Positioning and Restraints    Pre-Treatment Position sitting in chair/recliner  -RW      Post Treatment Position chair  -RW2      In Chair sitting;call light within reach;encouraged to call for assist;exit alarm on;with family/caregiver  -RW2      Recorded by [RW] Maria Guadalupe Mendez, [facility] 05/06/18 1049 05/06/18 1049  [RW2] Maria Guadalupe EGAN Andrea, PTA 05/06/18 1114 05/06/18 1114      Row Name 05/06/18 1044             Pain Scale: Numbers Pre/Post-Treatment    Pain Scale: Numbers, Pretreatment 0/10 - no pain  -RW      Recorded by [RW] Maria Guadalupe JIGNESH Andrea, PTA 05/06/18 1114 05/06/18 1114      Row Name 05/06/18 1044             Outcome Summary/Treatment Plan (PT)    Anticipated Discharge Disposition (PT) home with home health care  -RW      Recorded by [RW] Maria Guadalupe JIGNESH Andrea, [facility] 05/06/18 1114 05/06/18 1114        User Key  (r) = Recorded By, (t) = Taken By, (c) = Cosigned By    Initials Name Effective Dates Discipline    RW Maria Guadalupe Mendez, [facility] 03/07/18 -  PT                     Physical Therapy Education     Title: PT OT SLP Therapies (Active)     Topic: Physical Therapy (Active)     Point: Mobility training (Active)    Learning Progress Summary     Learner Status Readiness Method Response Comment Documented by    Patient Active Acceptance E,D NR   05/06/18 1112     Active Acceptance E,D NR   05/05/18 1244     Active Acceptance E,TB NR   05/04/18 1015    Family Active Acceptance E,D NR   05/06/18 1112          Point: Body mechanics (Active)    Learning Progress Summary     Learner Status Readiness Method Response Comment Documented by    Patient Active Acceptance E,D NR   05/06/18 1112     Active Acceptance E,D NR   05/05/18 1244     Active Acceptance E,TB NR   05/04/18 1015    Family Active Acceptance E,D NR   05/06/18 1112          Point: Precautions  (Active)    Learning Progress Summary     Learner Status Readiness Method Response Comment Documented by    Patient Active Acceptance E,D NR   05/06/18 1112     Active Acceptance E,D NR  RW 05/05/18 1244    Family Active Acceptance E,D NR   05/06/18 1112                      User Key     Initials Effective Dates Name Provider Type Discipline    EE 04/03/18 -  Ana Bradley, PT Physical Therapist PT     03/07/18 -  Maria Guadalupe Mendez, PTA Physical Therapy Assistant PT                    PT Recommendation and Plan  Anticipated Discharge Disposition (PT): home with home health care  Outcome Summary/Treatment Plan (PT)  Anticipated Discharge Disposition (PT): home with home health care  Plan of Care Reviewed With: patient  Outcome Summary: Pt tolerated PT well. Still not verbalizing, but would nod yes or no more today. Encouragement to participate w/ PT from family.          Outcome Measures     Row Name 05/06/18 1055 05/05/18 1155 05/04/18 1354       How much help from another person do you currently need...    Turning from your back to your side while in flat bed without using bedrails? 3  -RW 3  -RW  --    Moving from lying on back to sitting on the side of a flat bed without bedrails? 3  -RW 3  -RW  --    Moving to and from a bed to a chair (including a wheelchair)? 3  -RW 3  -RW  --    Standing up from a chair using your arms (e.g., wheelchair, bedside chair)? 3  -RW 3  -RW  --    Climbing 3-5 steps with a railing? 2  -RW 2  -RW  --    To walk in hospital room? 3  -RW 3  -RW  --    AM-PAC 6 Clicks Score 17  -RW 17  -RW  --       How much help from another is currently needed...    Putting on and taking off regular lower body clothing?  --  -- 1  -SG    Bathing (including washing, rinsing, and drying)  --  -- 1  -SG    Toileting (which includes using toilet bed pan or urinal)  --  -- 1  -SG    Putting on and taking off regular upper body clothing  --  -- 1  -SG    Taking care of personal grooming (such as brushing  teeth)  --  -- 1  -SG    Eating meals  --  -- 2  -SG    Score  --  -- 7  -SG       Functional Assessment    Outcome Measure Options AM-PAC 6 Clicks Basic Mobility (PT)  -RW AM-PAC 6 Clicks Basic Mobility (PT)  -RW AM-PAC 6 Clicks Daily Activity (OT)  -SG    Row Name 05/04/18 1000             How much help from another person do you currently need...    Turning from your back to your side while in flat bed without using bedrails? 3  -EE      Moving from lying on back to sitting on the side of a flat bed without bedrails? 3  -EE      Moving to and from a bed to a chair (including a wheelchair)? 3  -EE      Standing up from a chair using your arms (e.g., wheelchair, bedside chair)? 3  -EE      Climbing 3-5 steps with a railing? 2  -EE      To walk in hospital room? 3  -EE      AM-PAC 6 Clicks Score 17  -EE         Functional Assessment    Outcome Measure Options AM-PAC 6 Clicks Basic Mobility (PT)  -EE        User Key  (r) = Recorded By, (t) = Taken By, (c) = Cosigned By    Initials Name Provider Type     Daisy Payton, OTR Occupational Therapist    EE Ana Bradley, PT Physical Therapist    RW Maria Guadalupe Mendez PTA Physical Therapy Assistant           Time Calculation:         PT Charges     Row Name 05/06/18 1044             Time Calculation    Start Time 1042  -RW      Stop Time 1057  -RW      Time Calculation (min) 15 min  -RW      PT Received On 05/06/18  -RW      PT - Next Appointment 05/07/18  -        User Key  (r) = Recorded By, (t) = Taken By, (c) = Cosigned By    Initials Name Provider Type     Maria Guadalupe Mendez PTA Physical Therapy Assistant          Therapy Charges for Today     Code Description Service Date Service Provider Modifiers Qty    13202328743 HC PT THER PROC EA 15 MIN 5/5/2018 Maria Guadalupe Mendez PTA GP 1    06019941483 HC PT THER SUPP EA 15 MIN 5/5/2018 Maria Guadalupe Mendez PTA GP 1    87729404089 HC PT THER PROC EA 15 MIN 5/6/2018 Maria Guadalupe Mendez PTA GP 1    69011171612 HC PT THER SUPP EA 15 MIN  5/6/2018 Maria Guadalupe Mendez, PTA GP 1          PT G-Codes  Outcome Measure Options: AM-PAC 6 Clicks Basic Mobility (PT)    Maria Guadalupe Mendez, PTA  5/6/2018

## 2018-05-06 NOTE — PROGRESS NOTES
: the  Pt. Feeling better , denies complains    Vital Signs  Vitals:    05/06/18 0752   BP: (!) 182/93   Pulse: 65   Resp: 14   Temp: 97.6 °F (36.4 °C)   SpO2: 97%     I/O this shift:  In: -   Out: 750 [Urine:750]  I/O last 3 completed shifts:  In: 200 [P.O.:200]  Out: 700 [Urine:700]      Physical Exam:    General: nad  HEENT:  Normo cephalic, Atraumatic, EOMI, PERRLA, NO icterus,  Neck:  Supple, No JVD, No Carotid artery bruit, No lymphadenopathy  Chest: Clear to auscultation bilaterally,  Cardiovascular: Regular rate and rhythm. Positive S1 & S2, No rub, murmur or gallop.  Abdominal: Soft, non tender, no palpable organomegaly, no abdominal bruit, positive bowel sounds  Musculoskeletal: No joint tenderness or swelling, good range of motion, Adequate muscle strength on both sides, no cyanosis, clubbing or edema on lower extremities.  Neuro: Alert oriented x3, CN1 - 12 intact, No focal sensory or motor deficit.      Review of Systems:   CNS: Denied headaches, blurred vision, tingling or numbness in any part of body. No weakness or any impaired speech.  CVS: No chest pain or shortness of breath, No orthopnea or PND or exertional dyspnea,  Pulmonary: Denies shortness of breath, coughs, hematemesis, night sweats or weight loss.  GI: Denies diarrhea, nausea, vomiting. Denies weight loss, hematemesis, melena.  : Denies dysuria, frequency or hesitancy of urination  Vascular: Denies claudication, resting pain, tingling numbness or weakness in any part of the body.  Musculoskeletal: Denies Joint tenderness or pain, denies stiffness in joints, denies fever or weight loss, or skin rashes.    Current Labs  [unfilled]  reviewed  Current Radiology    Current Meds    Current Facility-Administered Medications:   •  amLODIPine (NORVASC) tablet 10 mg, 10 mg, Oral, Daily, Misha Suero MD, 10 mg at 05/06/18 1006  •  ARIPiprazole (ABILIFY) tablet 5 mg, 5 mg, Oral, Nightly, Misha Suero MD  •  aspirin chewable tablet 81 mg, 81  mg, Oral, Daily, Misha Suero MD, 81 mg at 05/06/18 1006  •  atorvastatin (LIPITOR) tablet 10 mg, 10 mg, Oral, Daily, Misha Suero MD, 10 mg at 05/06/18 1006  •  insulin aspart (novoLOG) injection 0-7 Units, 0-7 Units, Subcutaneous, 4x Daily With Meals & Nightly, Misha Suero MD  •  latanoprost (XALATAN) 0.005 % ophthalmic solution 1 drop, 1 drop, Both Eyes, Nightly, Misha Suero MD, 1 drop at 05/04/18 2041  •  levothyroxine (SYNTHROID, LEVOTHROID) tablet 25 mcg, 25 mcg, Oral, QAM, Misha Suero MD, 25 mcg at 05/06/18 1006  •  lisinopril (PRINIVIL,ZESTRIL) tablet 10 mg, 10 mg, Oral, Q24H, Christopher Fuller MD, 10 mg at 05/06/18 1006  •  multivitamin (THERAGRAN) tablet 1 tablet, 1 tablet, Oral, Daily, Misha Suero MD, 1 tablet at 05/06/18 1007  •  pantoprazole (PROTONIX) EC tablet 40 mg, 40 mg, Oral, QAM, Misha Suero MD, 40 mg at 05/06/18 1005  •  sodium chloride 0.9 % flush 10 mL, 10 mL, Intravenous, PRN, Obinna Fairbanks MD  •  sodium chloride 0.9 % with KCl 20 mEq/L infusion, 50 mL/hr, Intravenous, Continuous, Christopher Fuller MD, Last Rate: 50 mL/hr at 05/06/18 0239, 50 mL/hr at 05/06/18 0239  •  timolol (TIMOPTIC) 0.5 % ophthalmic solution 1 drop, 1 drop, Both Eyes, Daily, Misha Suero MD, 1 drop at 05/06/18 1007  •  venlafaxine (EFFEXOR) tablet 75 mg, 75 mg, Oral, Daily, Misha Suero MD, 75 mg at 05/06/18 1005              Patient Active Problem List   Diagnosis   • Acute renal failure   acute renal injury resolving , volume ok , clinically better        Scott Bryant MD FACP, FASN.  05/06/18  11:08 AM

## 2018-05-07 LAB
ANION GAP SERPL CALCULATED.3IONS-SCNC: 13.5 MMOL/L
BACTERIA SPEC AEROBE CULT: NORMAL
BACTERIA SPEC AEROBE CULT: NORMAL
BUN BLD-MCNC: 27 MG/DL (ref 8–23)
BUN/CREAT SERPL: 30.7 (ref 7–25)
CALCIUM SPEC-SCNC: 10 MG/DL (ref 8.6–10.5)
CHLORIDE SERPL-SCNC: 107 MMOL/L (ref 98–107)
CO2 SERPL-SCNC: 20.5 MMOL/L (ref 22–29)
CREAT BLD-MCNC: 0.88 MG/DL (ref 0.57–1)
DEPRECATED RDW RBC AUTO: 46.3 FL (ref 37–54)
ERYTHROCYTE [DISTWIDTH] IN BLOOD BY AUTOMATED COUNT: 14.1 % (ref 11.7–13)
GFR SERPL CREATININE-BSD FRML MDRD: 61 ML/MIN/1.73
GLUCOSE BLD-MCNC: 105 MG/DL (ref 65–99)
GLUCOSE BLDC GLUCOMTR-MCNC: 163 MG/DL (ref 70–130)
GLUCOSE BLDC GLUCOMTR-MCNC: 85 MG/DL (ref 70–130)
GLUCOSE BLDC GLUCOMTR-MCNC: 97 MG/DL (ref 70–130)
GLUCOSE BLDC GLUCOMTR-MCNC: 97 MG/DL (ref 70–130)
HCT VFR BLD AUTO: 38.8 % (ref 35.6–45.5)
HGB BLD-MCNC: 12.7 G/DL (ref 11.9–15.5)
MAGNESIUM SERPL-MCNC: 2.1 MG/DL (ref 1.6–2.4)
MCH RBC QN AUTO: 29.3 PG (ref 26.9–32)
MCHC RBC AUTO-ENTMCNC: 32.7 G/DL (ref 32.4–36.3)
MCV RBC AUTO: 89.4 FL (ref 80.5–98.2)
PLATELET # BLD AUTO: 430 10*3/MM3 (ref 140–500)
PMV BLD AUTO: 10.4 FL (ref 6–12)
POTASSIUM BLD-SCNC: 3.9 MMOL/L (ref 3.5–5.2)
RBC # BLD AUTO: 4.34 10*6/MM3 (ref 3.9–5.2)
SODIUM BLD-SCNC: 141 MMOL/L (ref 136–145)
WBC NRBC COR # BLD: 11.23 10*3/MM3 (ref 4.5–10.7)

## 2018-05-07 PROCEDURE — 82962 GLUCOSE BLOOD TEST: CPT

## 2018-05-07 PROCEDURE — 83735 ASSAY OF MAGNESIUM: CPT | Performed by: INTERNAL MEDICINE

## 2018-05-07 PROCEDURE — 63710000001 INSULIN ASPART PER 5 UNITS: Performed by: HOSPITALIST

## 2018-05-07 PROCEDURE — 80048 BASIC METABOLIC PNL TOTAL CA: CPT | Performed by: INTERNAL MEDICINE

## 2018-05-07 PROCEDURE — 85027 COMPLETE CBC AUTOMATED: CPT | Performed by: INTERNAL MEDICINE

## 2018-05-07 RX ORDER — SODIUM BICARBONATE 650 MG/1
650 TABLET ORAL 4 TIMES DAILY
Status: DISCONTINUED | OUTPATIENT
Start: 2018-05-07 | End: 2018-05-09 | Stop reason: HOSPADM

## 2018-05-07 RX ORDER — HYDROCHLOROTHIAZIDE 12.5 MG/1
12.5 CAPSULE, GELATIN COATED ORAL DAILY
Status: DISCONTINUED | OUTPATIENT
Start: 2018-05-07 | End: 2018-05-09 | Stop reason: HOSPADM

## 2018-05-07 RX ADMIN — TIMOLOL MALEATE 1 DROP: 5 SOLUTION/ DROPS OPHTHALMIC at 10:17

## 2018-05-07 RX ADMIN — LISINOPRIL 20 MG: 20 TABLET ORAL at 10:16

## 2018-05-07 RX ADMIN — VENLAFAXINE HYDROCHLORIDE 75 MG: 75 TABLET ORAL at 10:16

## 2018-05-07 RX ADMIN — ASPIRIN 81 MG: 81 TABLET, CHEWABLE ORAL at 10:17

## 2018-05-07 RX ADMIN — AMLODIPINE BESYLATE 10 MG: 10 TABLET ORAL at 10:16

## 2018-05-07 RX ADMIN — INSULIN ASPART 2 UNITS: 100 INJECTION, SOLUTION INTRAVENOUS; SUBCUTANEOUS at 21:32

## 2018-05-07 RX ADMIN — SODIUM BICARBONATE 650 MG: 650 TABLET ORAL at 10:16

## 2018-05-07 RX ADMIN — ATORVASTATIN CALCIUM 10 MG: 10 TABLET, FILM COATED ORAL at 10:16

## 2018-05-07 RX ADMIN — Medication 1 TABLET: at 10:16

## 2018-05-07 NOTE — NURSING NOTE
Pt withdrawn this am and will not make eye contact.  She is refusing all meds this am.  Will attempt meds again when family is with her.  Will continue to monitor.  JOEY Dumont RN

## 2018-05-07 NOTE — PLAN OF CARE
Problem: Patient Care Overview  Goal: Plan of Care Review  Outcome: Ongoing (interventions implemented as appropriate)   05/07/18 1529   OTHER   Outcome Summary Pt very quiet this shift. Will not make eye contact, withdrawn, and very flat. Family with her most of the day. She would not take her meds for me, but her son was able to get her to take them. She rested most of the day. Walked in [place] some and up in chair this afternoon. VSS. Will continue to monitor.     Goal: Individualization and Mutuality  Outcome: Ongoing (interventions implemented as appropriate)    Goal: Discharge Needs Assessment  Outcome: Ongoing (interventions implemented as appropriate)    Goal: Interprofessional Rounds/Family Conf  Outcome: Ongoing (interventions implemented as appropriate)      Problem: Skin Injury Risk (Adult)  Goal: Skin Health and Integrity  Outcome: Ongoing (interventions implemented as appropriate)      Problem: Confusion, Acute (Adult)  Goal: Cognitive/Functional Impairments Minimized  Outcome: Ongoing (interventions implemented as appropriate)    Goal: Safety  Outcome: Ongoing (interventions implemented as appropriate)      Problem: Renal Failure/Kidney Injury, Acute (Adult)  Goal: Signs and Symptoms of Listed Potential Problems Will be Absent, Minimized or Managed (Renal Failure/Kidney Injury, Acute)  Outcome: Ongoing (interventions implemented as appropriate)      Problem: Fall Risk (Adult)  Goal: Absence of Fall  Outcome: Ongoing (interventions implemented as appropriate)

## 2018-05-07 NOTE — PLAN OF CARE
Problem: Patient Care Overview  Goal: Plan of Care Review  Outcome: Ongoing (interventions implemented as appropriate)   05/06/18 1959   Coping/Psychosocial   Plan of Care Reviewed With patient   Plan of Care Review   Progress improving   OTHER   Outcome Summary Pt continues to improve and verbalize, remains confused, no other complications       Problem: Renal Failure/Kidney Injury, Acute (Adult)  Goal: Signs and Symptoms of Listed Potential Problems Will be Absent, Minimized or Managed (Renal Failure/Kidney Injury, Acute)  Outcome: Ongoing (interventions implemented as appropriate)      Problem: Fall Risk (Adult)  Goal: Absence of Fall  Outcome: Ongoing (interventions implemented as appropriate)

## 2018-05-07 NOTE — PLAN OF CARE
Problem: Patient Care Overview  Goal: Plan of Care Review  Outcome: Ongoing (interventions implemented as appropriate)   05/07/18 0521   Coping/Psychosocial   Plan of Care Reviewed With patient   Plan of Care Review   Progress improving   OTHER   Outcome Summary Pt was very cooperative with me. She is A&O. She is confused though, as she does not understand why she has to take certain medications and will not call out to use bathroom or for other needs. Gave sodium bicarbonate last night. Said she could not take whole pill so i split in half for her. No c/o pain or discomforts. VS stable, will continue to monitor.     Goal: Individualization and Mutuality  Outcome: Ongoing (interventions implemented as appropriate)    Goal: Discharge Needs Assessment  Outcome: Ongoing (interventions implemented as appropriate)    Goal: Interprofessional Rounds/Family Conf  Outcome: Ongoing (interventions implemented as appropriate)      Problem: Skin Injury Risk (Adult)  Goal: Skin Health and Integrity  Outcome: Ongoing (interventions implemented as appropriate)      Problem: Confusion, Acute (Adult)  Goal: Cognitive/Functional Impairments Minimized  Outcome: Ongoing (interventions implemented as appropriate)    Goal: Safety  Outcome: Ongoing (interventions implemented as appropriate)      Problem: Renal Failure/Kidney Injury, Acute (Adult)  Goal: Signs and Symptoms of Listed Potential Problems Will be Absent, Minimized or Managed (Renal Failure/Kidney Injury, Acute)  Outcome: Ongoing (interventions implemented as appropriate)      Problem: Fall Risk (Adult)  Goal: Absence of Fall  Outcome: Ongoing (interventions implemented as appropriate)

## 2018-05-07 NOTE — PROGRESS NOTES
"   LOS: 5 days   Patient Care Team:  Madelaine Parisi MD as PCP - General (Family Medicine)    Chief Complaint/ Reason for encounter: Acute renal failure, severe dehydration  Chief Complaint   Patient presents with   • Altered Mental Status         Subjective     Altered Mental Status   Associated symptoms include weakness. Pertinent negatives include no chest pain or fever.       Subjective:  Symptoms:  Improved.  She reports weakness.  No shortness of breath or chest pain.  (She seems to be doing better today  More alert, awake, following commands, improved eye contact  Still  will not verbally communicate).    Diet:  Poor intake.    Activity level: Impaired due to weakness.    Pain:  She reports no pain.          History taken from: Patient and chart    Objective     Vital Signs  Temp:  [97.5 °F (36.4 °C)-98.5 °F (36.9 °C)] 97.5 °F (36.4 °C)  Heart Rate:  [81-92] 81  Resp:  [16] 16  BP: (160-170)/(81-92) 170/92       Wt Readings from Last 1 Encounters:   05/03/18 1317 55.8 kg (123 lb)   05/02/18 2230 55.9 kg (123 lb 4.8 oz)   05/02/18 1737 65.8 kg (145 lb)       Objective:  General Appearance:  Comfortable, in no acute distress and not in pain (Chronically ill-appearing).    Vital signs: (most recent): Blood pressure 170/92, pulse 81, temperature 97.5 °F (36.4 °C), temperature source Oral, resp. rate 16, height 157.5 cm (62\"), weight 55.8 kg (123 lb), SpO2 96 %.  Vital signs are normal.  No fever.    Output: Producing urine.    HEENT: Normal HEENT exam.    Lungs:  Normal effort and normal respiratory rate.  Breath sounds clear to auscultation.  She is not in respiratory distress.  No decreased breath sounds.    Heart: Normal rate.  Regular rhythm.  S1 normal.  No murmur.   Abdomen: Abdomen is soft and non-distended.  Bowel sounds are normal.   There is no abdominal tenderness.     Extremities: Normal range of motion.  There is no deformity or dependent edema.    Pulses: Distal pulses are intact.  "   Neurological: Patient is alert.    Skin:  Warm and dry.  No rash or cyanosis.             Results Review:    Past Medical History: reviewed  Past Medical History:   Diagnosis Date   • Depression    • Diabetes mellitus     TYPE 2   • Disease of thyroid gland     HYPOTHYROID   • Glaucoma    • Hyperlipidemia    • Hypertension    • Insomnia    • Mood disorder          Allergies:  Allergies   Allergen Reactions   • Prozac [Fluoxetine Hcl] Unknown (See Comments)     Off of nursing home paperwork       Intake/Output:     Intake/Output Summary (Last 24 hours) at 05/07/18 1405  Last data filed at 05/07/18 1300   Gross per 24 hour   Intake                0 ml   Output              400 ml   Net             -400 ml         DATA:  Radiology and Labs:  The following labs independently reviewed by me.  Interval notes, chart personally reviewed by me.   Old records independently reviewed showing Normal prior renal function, no chronic kidney disease  The following radiologic studies independently viewed by me, findings renal ultrasound with no obstruction, left renal calculus  Discussed with her family members at bedside and provided additional history, past medical history        Labs:   Recent Results (from the past 24 hour(s))   POC Glucose Once    Collection Time: 05/06/18  4:36 PM   Result Value Ref Range    Glucose 100 70 - 130 mg/dL   POC Glucose Once    Collection Time: 05/06/18  8:33 PM   Result Value Ref Range    Glucose 113 70 - 130 mg/dL   Basic Metabolic Panel    Collection Time: 05/07/18  7:17 AM   Result Value Ref Range    Glucose 105 (H) 65 - 99 mg/dL    BUN 27 (H) 8 - 23 mg/dL    Creatinine 0.88 0.57 - 1.00 mg/dL    Sodium 141 136 - 145 mmol/L    Potassium 3.9 3.5 - 5.2 mmol/L    Chloride 107 98 - 107 mmol/L    CO2 20.5 (L) 22.0 - 29.0 mmol/L    Calcium 10.0 8.6 - 10.5 mg/dL    eGFR Non African Amer 61 >60 mL/min/1.73    BUN/Creatinine Ratio 30.7 (H) 7.0 - 25.0    Anion Gap 13.5 mmol/L   CBC (No Diff)     Collection Time: 05/07/18  7:17 AM   Result Value Ref Range    WBC 11.23 (H) 4.50 - 10.70 10*3/mm3    RBC 4.34 3.90 - 5.20 10*6/mm3    Hemoglobin 12.7 11.9 - 15.5 g/dL    Hematocrit 38.8 35.6 - 45.5 %    MCV 89.4 80.5 - 98.2 fL    MCH 29.3 26.9 - 32.0 pg    MCHC 32.7 32.4 - 36.3 g/dL    RDW 14.1 (H) 11.7 - 13.0 %    RDW-SD 46.3 37.0 - 54.0 fl    MPV 10.4 6.0 - 12.0 fL    Platelets 430 140 - 500 10*3/mm3   Magnesium    Collection Time: 05/07/18  7:17 AM   Result Value Ref Range    Magnesium 2.1 1.6 - 2.4 mg/dL   POC Glucose Once    Collection Time: 05/07/18  7:26 AM   Result Value Ref Range    Glucose 85 70 - 130 mg/dL   POC Glucose Once    Collection Time: 05/07/18 11:10 AM   Result Value Ref Range    Glucose 97 70 - 130 mg/dL       Radiology:  Imaging Results (last 24 hours)     ** No results found for the last 24 hours. **             Medications have been reviewed:  Current Facility-Administered Medications   Medication Dose Route Frequency Provider Last Rate Last Dose   • amLODIPine (NORVASC) tablet 10 mg  10 mg Oral Daily Misha Suero MD   10 mg at 05/07/18 1016   • aspirin chewable tablet 81 mg  81 mg Oral Daily Misha Suero MD   81 mg at 05/07/18 1017   • atorvastatin (LIPITOR) tablet 10 mg  10 mg Oral Daily Misha Suero MD   10 mg at 05/07/18 1016   • hydrochlorothiazide (MICROZIDE) capsule 12.5 mg  12.5 mg Oral Daily Christopher Fuller MD       • insulin aspart (novoLOG) injection 0-7 Units  0-7 Units Subcutaneous 4x Daily With Meals & Nightly Misha Suero MD       • latanoprost (XALATAN) 0.005 % ophthalmic solution 1 drop  1 drop Both Eyes Nightly Misha Suero MD   1 drop at 05/06/18 8180   • levothyroxine (SYNTHROID, LEVOTHROID) tablet 25 mcg  25 mcg Oral QAM Misha Suero MD   25 mcg at 05/06/18 1006   • lisinopril (PRINIVIL,ZESTRIL) tablet 20 mg  20 mg Oral Q24H Verena Topete MD   20 mg at 05/07/18 1016   • multivitamin (THERAGRAN) tablet 1 tablet  1 tablet Oral Daily Misha Suero MD   1 tablet  at 05/07/18 1016   • pantoprazole (PROTONIX) EC tablet 40 mg  40 mg Oral QAM Misha Suero MD   40 mg at 05/06/18 1005   • QUEtiapine (SEROquel) tablet 25 mg  25 mg Oral 4x Daily PRN Verena Yvon Topete MD       • sodium chloride 0.9 % flush 10 mL  10 mL Intravenous PRN Obinna Fairbanks MD       • timolol (TIMOPTIC) 0.5 % ophthalmic solution 1 drop  1 drop Both Eyes Daily Misha Suero MD   1 drop at 05/07/18 1017   • venlafaxine (EFFEXOR) tablet 75 mg  75 mg Oral Daily Misha Suero MD   75 mg at 05/07/18 1016       ASSESSMENT:  Acute renal failure, Much improved, near baseline  dehydration  Mild leukocytosis  metabolic acidosis, possibly from isotonic fluids, much better  Proteinuria, around 2 g per quantification, likely from hypertension  Chronic hypertension, worsening control, restart HCTZ  Type 2 diabetes mellitus  Probable stage II to 3 chronic kidney disease     Plan:   Her renal function has improved, near baseline  Blood pressure above goal, continue ACE inhibitor, add low-dose HCTZ  Continue to encourage oral fluid intake  Continue sodium bicarbonate, hold NSAIDs  Check labs in a.m.  Discussed with family at bedside    Continue to monitor renal function, electroytes and volume closely   Please call me with any questions or concerns    Christopher Fuller MD   [facility]   Office phone number: [phone]  Answering service phone number: [phone]    05/07/18  2:05 PM      Dictation performed using Dragon dictation software

## 2018-05-07 NOTE — PROGRESS NOTES
"Daily progress note    Chief complaint  Not doing too well this morning  No specific complaint  Low depressed    History of present illness  86-year-old white female with multiple medical problems including diabetes mellitus mellitus hypertension hyperlipidemia anxiety disorder and depression hypothyroidism mood disorder glaucoma brought to the emergency room by the family with altered mental status going on for last 1 week.  Patient lives by herself and pretty independent.  No fever chills no chest pain shortness of breath abdominal pain nausea vomiting diarrhea.  Patient evaluated in ER found to be in acute kidney injury admitted for management.     REVIEW OF SYSTEMS  Review of Systems   Unable to perform ROS: Mental status change      PHYSICAL EXAM  Blood pressure 170/92, pulse 81, temperature 97.5 °F (36.4 °C), temperature source Oral, resp. rate 16, height 157.5 cm (62\"), weight 55.8 kg (123 lb), SpO2 96 %.    Constitutional: She is oriented to person, place, and time and well-developed, well-nourished, and in no distress. No distress.   Flat affect.  Doesn't speak unless asked a direct question.  Very limited history obtained from patient.     Head: Normocephalic and atraumatic.   Eyes: EOM are normal. Pupils are equal, round, and reactive to light.   Neck: Normal range of motion. Neck supple.   Cardiovascular: Normal rate, regular rhythm, normal heart sounds and intact distal pulses.    Pulmonary/Chest: Effort normal. No respiratory distress.   Abdominal: Soft. There is no tenderness. There is no rebound and no guarding.   Musculoskeletal: Normal range of motion. She exhibits no edema.   Neurological: She is alert and oriented to person, place, and time. She has normal sensation and normal strength.   Pt is oriented x2 on exam  PT is slow to respond, but answers questions appropriately and follows commands    Skin: Skin is warm and dry. No rash noted.   Psychiatric: She has a flat affect.     LAB RESULTS  Lab " Results (last 24 hours)     Procedure Component Value Units Date/Time    POC Glucose Once [[id number]]  (Normal) Collected:  05/07/18 1110    Specimen:  Blood Updated:  05/07/18 1112     Glucose 97 mg/dL     Narrative:       Meter: [id number] : [id number] Gloria KRAFT    Basic Metabolic Panel [[id number]]  (Abnormal) Collected:  05/07/18 0717    Specimen:  Blood Updated:  05/07/18 0802     Glucose 105 (H) mg/dL      BUN 27 (H) mg/dL      Creatinine 0.88 mg/dL      Sodium 141 mmol/L      Potassium 3.9 mmol/L      Chloride 107 mmol/L      CO2 20.5 (L) mmol/L      Calcium 10.0 mg/dL      eGFR Non African Amer 61 mL/min/1.73      BUN/Creatinine Ratio 30.7 (H)     Anion Gap 13.5 mmol/L     Narrative:       The MDRD GFR formula is only valid for adults with stable renal function between ages 18 and 70.    Magnesium [[id number]]  (Normal) Collected:  05/07/18 0717    Specimen:  Blood Updated:  05/07/18 0802     Magnesium 2.1 mg/dL     CBC (No Diff) [[id number]]  (Abnormal) Collected:  05/07/18 0717    Specimen:  Blood Updated:  05/07/18 0746     WBC 11.23 (H) 10*3/mm3      RBC 4.34 10*6/mm3      Hemoglobin 12.7 g/dL      Hematocrit 38.8 %      MCV 89.4 fL      MCH 29.3 pg      MCHC 32.7 g/dL      RDW 14.1 (H) %      RDW-SD 46.3 fl      MPV 10.4 fL      Platelets 430 10*3/mm3     POC Glucose Once [[id number]]  (Normal) Collected:  05/07/18 0726    Specimen:  Blood Updated:  05/07/18 0727     Glucose 85 mg/dL     Narrative:       Meter: [id number] : [id number] Gloria KRAFT    POC Glucose Once [[id number]]  (Normal) Collected:  05/06/18 2033    Specimen:  Blood Updated:  05/06/18 2034     Glucose 113 mg/dL     Narrative:       Meter: [id number] : [id number] Lionel KRAFT    Blood Culture - Blood, [[id number]]  (Normal) Collected:  05/02/18 1939    Specimen:  Blood from Arm, Right Updated:  05/06/18 2000     Blood Culture No growth at 4 days    Blood Culture - Blood, [[id number]]  (Normal) Collected:   05/02/18 1904    Specimen:  Blood from Arm, Left Updated:  05/06/18 1915     Blood Culture No growth at 4 days    POC Glucose Once [[id number]]  (Normal) Collected:  05/06/18 1636    Specimen:  Blood Updated:  05/06/18 1637     Glucose 100 mg/dL     Narrative:       Meter: [id number] : [id number] Ryann Lucero ABENA        Imaging Results (last 24 hours)     ** No results found for the last 24 hours. **        EKG                              Rhythm/Rate: sinus rhythm, 82  P waves and OK: normal  QRS, axis: normal QRS, borderline prolonged QT   ST and T waves: Nonspecific        Current Facility-Administered Medications:   •  amLODIPine (NORVASC) tablet 10 mg, 10 mg, Oral, Daily, Misha Suero MD, 10 mg at 05/07/18 1016  •  aspirin chewable tablet 81 mg, 81 mg, Oral, Daily, Misha Suero MD, 81 mg at 05/07/18 1017  •  atorvastatin (LIPITOR) tablet 10 mg, 10 mg, Oral, Daily, Misha Suero MD, 10 mg at 05/07/18 1016  •  hydrochlorothiazide (MICROZIDE) capsule 12.5 mg, 12.5 mg, Oral, Daily, Christopher Fuller MD  •  insulin aspart (novoLOG) injection 0-7 Units, 0-7 Units, Subcutaneous, 4x Daily With Meals & Nightly, Misha Suero MD  •  latanoprost (XALATAN) 0.005 % ophthalmic solution 1 drop, 1 drop, Both Eyes, Nightly, Misha Suero MD, 1 drop at 05/06/18 2130  •  levothyroxine (SYNTHROID, LEVOTHROID) tablet 25 mcg, 25 mcg, Oral, QAM, Misha Suero MD, 25 mcg at 05/06/18 1006  •  lisinopril (PRINIVIL,ZESTRIL) tablet 20 mg, 20 mg, Oral, Q24H, Verena Topete MD, 20 mg at 05/07/18 1016  •  multivitamin (THERAGRAN) tablet 1 tablet, 1 tablet, Oral, Daily, Misha Suero MD, 1 tablet at 05/07/18 1016  •  pantoprazole (PROTONIX) EC tablet 40 mg, 40 mg, Oral, QAM, Misha Suero MD, 40 mg at 05/06/18 1005  •  QUEtiapine (SEROquel) tablet 25 mg, 25 mg, Oral, 4x Daily PRN, Verena Topete MD  •  sodium bicarbonate tablet 650 mg, 650 mg, Oral, 4x Daily, Christopher Fuller MD  •  sodium chloride 0.9 % flush 10 mL, 10  mL, Intravenous, PRN, Obinna Fairbanks MD  •  timolol (TIMOPTIC) 0.5 % ophthalmic solution 1 drop, 1 drop, Both Eyes, Daily, Jonah Suero MD, 1 drop at 05/07/18 1017  •  venlafaxine (EFFEXOR) tablet 75 mg, 75 mg, Oral, Daily, Jonah Suero MD, 75 mg at 05/07/18 1016     ASSESSMENT  Acute kidney injury  Chronic kidney disease stage III  Metabolic encephalopathic  Diabetes mellitus  Hypertension  Hyperlipidemia  Hypothyroidism  Mood disorder  Anxiety disorder  Depression  Gastroesophageal reflux disease    PLAN  CP  Stop all nephrotoxic agents including ACE inhibitors Motrin Glucophage  Nephrology following the patient  Psych consult  Accu-Chek with sliding scale insulin  Celis catheter  Supportive care  Stress ulcer DVT prophylaxis  PTOT  Discussed with family  Subacute rehabilitation once more stable    JONAH SUERO MD

## 2018-05-07 NOTE — SIGNIFICANT NOTE
05/07/18 1328   Rehab Treatment   Discipline physical therapist   Reason Treatment Not Performed patient/family declined treatment  (Attempted PT. Patient shook her head when asked if wanted to do PT today. Does not make eye contact. Nsg aide at bedside and voiced she has been walking to the BR with her. Will check back later today if time allows.)   Recommendation   PT - Next Appointment 05/08/18

## 2018-05-08 LAB
ANION GAP SERPL CALCULATED.3IONS-SCNC: 13.1 MMOL/L
BASOPHILS # BLD AUTO: 0.02 10*3/MM3 (ref 0–0.2)
BASOPHILS NFR BLD AUTO: 0.2 % (ref 0–1.5)
BUN BLD-MCNC: 34 MG/DL (ref 8–23)
BUN/CREAT SERPL: 41 (ref 7–25)
CALCIUM SPEC-SCNC: 10.3 MG/DL (ref 8.6–10.5)
CHLORIDE SERPL-SCNC: 110 MMOL/L (ref 98–107)
CO2 SERPL-SCNC: 21.9 MMOL/L (ref 22–29)
CREAT BLD-MCNC: 0.83 MG/DL (ref 0.57–1)
DEPRECATED RDW RBC AUTO: 45.9 FL (ref 37–54)
EOSINOPHIL # BLD AUTO: 0.09 10*3/MM3 (ref 0–0.7)
EOSINOPHIL NFR BLD AUTO: 0.7 % (ref 0.3–6.2)
ERYTHROCYTE [DISTWIDTH] IN BLOOD BY AUTOMATED COUNT: 14.1 % (ref 11.7–13)
GFR SERPL CREATININE-BSD FRML MDRD: 65 ML/MIN/1.73
GLUCOSE BLD-MCNC: 121 MG/DL (ref 65–99)
GLUCOSE BLDC GLUCOMTR-MCNC: 133 MG/DL (ref 70–130)
GLUCOSE BLDC GLUCOMTR-MCNC: 136 MG/DL (ref 70–130)
GLUCOSE BLDC GLUCOMTR-MCNC: 190 MG/DL (ref 70–130)
GLUCOSE BLDC GLUCOMTR-MCNC: 205 MG/DL (ref 70–130)
GLUCOSE BLDC GLUCOMTR-MCNC: 238 MG/DL (ref 70–130)
HCT VFR BLD AUTO: 39.3 % (ref 35.6–45.5)
HGB BLD-MCNC: 12.6 G/DL (ref 11.9–15.5)
IMM GRANULOCYTES # BLD: 0.06 10*3/MM3 (ref 0–0.03)
IMM GRANULOCYTES NFR BLD: 0.5 % (ref 0–0.5)
LYMPHOCYTES # BLD AUTO: 1.69 10*3/MM3 (ref 0.9–4.8)
LYMPHOCYTES NFR BLD AUTO: 13.4 % (ref 19.6–45.3)
MCH RBC QN AUTO: 28.6 PG (ref 26.9–32)
MCHC RBC AUTO-ENTMCNC: 32.1 G/DL (ref 32.4–36.3)
MCV RBC AUTO: 89.1 FL (ref 80.5–98.2)
MONOCYTES # BLD AUTO: 0.73 10*3/MM3 (ref 0.2–1.2)
MONOCYTES NFR BLD AUTO: 5.8 % (ref 5–12)
NEUTROPHILS # BLD AUTO: 9.99 10*3/MM3 (ref 1.9–8.1)
NEUTROPHILS NFR BLD AUTO: 79.4 % (ref 42.7–76)
PLATELET # BLD AUTO: 422 10*3/MM3 (ref 140–500)
PMV BLD AUTO: 10.5 FL (ref 6–12)
POTASSIUM BLD-SCNC: 3.8 MMOL/L (ref 3.5–5.2)
RBC # BLD AUTO: 4.41 10*6/MM3 (ref 3.9–5.2)
SODIUM BLD-SCNC: 145 MMOL/L (ref 136–145)
WBC NRBC COR # BLD: 12.58 10*3/MM3 (ref 4.5–10.7)

## 2018-05-08 PROCEDURE — 63710000001 INSULIN ASPART PER 5 UNITS: Performed by: HOSPITALIST

## 2018-05-08 PROCEDURE — 63710000001 INSULIN DETEMER PER 5 UNITS: Performed by: HOSPITALIST

## 2018-05-08 PROCEDURE — 82962 GLUCOSE BLOOD TEST: CPT

## 2018-05-08 PROCEDURE — 80048 BASIC METABOLIC PNL TOTAL CA: CPT | Performed by: INTERNAL MEDICINE

## 2018-05-08 PROCEDURE — 85025 COMPLETE CBC W/AUTO DIFF WBC: CPT | Performed by: HOSPITALIST

## 2018-05-08 RX ORDER — HYDRALAZINE HYDROCHLORIDE 25 MG/1
25 TABLET, FILM COATED ORAL EVERY 8 HOURS SCHEDULED
Status: DISCONTINUED | OUTPATIENT
Start: 2018-05-08 | End: 2018-05-09

## 2018-05-08 RX ORDER — ARIPIPRAZOLE 5 MG/1
5 TABLET ORAL NIGHTLY
Status: DISCONTINUED | OUTPATIENT
Start: 2018-05-08 | End: 2018-05-09 | Stop reason: HOSPADM

## 2018-05-08 RX ADMIN — VENLAFAXINE HYDROCHLORIDE 75 MG: 75 TABLET ORAL at 09:45

## 2018-05-08 RX ADMIN — HYDROCHLOROTHIAZIDE 12.5 MG: 12.5 CAPSULE, GELATIN COATED ORAL at 09:45

## 2018-05-08 RX ADMIN — ATORVASTATIN CALCIUM 10 MG: 10 TABLET, FILM COATED ORAL at 09:45

## 2018-05-08 RX ADMIN — SODIUM BICARBONATE 650 MG: 650 TABLET ORAL at 09:45

## 2018-05-08 RX ADMIN — SODIUM BICARBONATE 650 MG: 650 TABLET ORAL at 20:56

## 2018-05-08 RX ADMIN — INSULIN ASPART 3 UNITS: 100 INJECTION, SOLUTION INTRAVENOUS; SUBCUTANEOUS at 12:31

## 2018-05-08 RX ADMIN — HYDRALAZINE HYDROCHLORIDE 25 MG: 25 TABLET, FILM COATED ORAL at 21:00

## 2018-05-08 RX ADMIN — AMLODIPINE BESYLATE 10 MG: 10 TABLET ORAL at 09:45

## 2018-05-08 RX ADMIN — SODIUM BICARBONATE 650 MG: 650 TABLET ORAL at 11:58

## 2018-05-08 RX ADMIN — HYDRALAZINE HYDROCHLORIDE 25 MG: 25 TABLET, FILM COATED ORAL at 16:39

## 2018-05-08 RX ADMIN — TIMOLOL MALEATE 1 DROP: 5 SOLUTION/ DROPS OPHTHALMIC at 10:08

## 2018-05-08 RX ADMIN — LISINOPRIL 20 MG: 20 TABLET ORAL at 09:45

## 2018-05-08 RX ADMIN — ARIPIPRAZOLE 5 MG: 5 TABLET ORAL at 20:56

## 2018-05-08 RX ADMIN — LATANOPROST 1 DROP: 50 SOLUTION OPHTHALMIC at 20:56

## 2018-05-08 RX ADMIN — Medication 1 TABLET: at 09:45

## 2018-05-08 RX ADMIN — ASPIRIN 81 MG: 81 TABLET, CHEWABLE ORAL at 09:45

## 2018-05-08 RX ADMIN — SODIUM BICARBONATE 650 MG: 650 TABLET ORAL at 18:35

## 2018-05-08 NOTE — SIGNIFICANT NOTE
"   05/08/18 1330   Rehab Treatment   Discipline physical therapist   Reason Treatment Not Performed patient/family declined treatment  (Multiple attempts to coax pt into standing and ambulating made by PT and RN. Pt repeatedly states \"no\", resistive to attempts at transfer. Will follow up tomorrow. )   Recommendation   PT - Next Appointment 05/09/18     "

## 2018-05-08 NOTE — PROGRESS NOTES
"Daily progress note    Chief complaint  Doing little better today  No specific complaint    History of present illness  86-year-old white female with multiple medical problems including diabetes mellitus mellitus hypertension hyperlipidemia anxiety disorder and depression hypothyroidism mood disorder glaucoma brought to the emergency room by the family with altered mental status going on for last 1 week.  Patient lives by herself and pretty independent.  No fever chills no chest pain shortness of breath abdominal pain nausea vomiting diarrhea.  Patient evaluated in ER found to be in acute kidney injury admitted for management.     REVIEW OF SYSTEMS  Review of Systems   Unable to perform ROS: Mental status change      PHYSICAL EXAM  Blood pressure (!) 190/97, pulse 93, temperature 98.5 °F (36.9 °C), temperature source Oral, resp. rate 16, height 157.5 cm (62\"), weight 55.8 kg (123 lb), SpO2 95 %.    Constitutional: She is oriented to person, place, and time and well-developed, well-nourished, and in no distress. No distress.   Flat affect.  Doesn't speak unless asked a direct question.  Very limited history obtained from patient.     Head: Normocephalic and atraumatic.   Eyes: EOM are normal. Pupils are equal, round, and reactive to light.   Neck: Normal range of motion. Neck supple.   Cardiovascular: Normal rate, regular rhythm, normal heart sounds and intact distal pulses.    Pulmonary/Chest: Effort normal. No respiratory distress.   Abdominal: Soft. There is no tenderness. There is no rebound and no guarding.   Musculoskeletal: Normal range of motion. She exhibits no edema.   Neurological: She is alert and oriented to person, place, and time. She has normal sensation and normal strength.   Pt is oriented x2 on exam  PT is slow to respond, but answers questions appropriately and follows commands    Skin: Skin is warm and dry. No rash noted.   Psychiatric: She has a flat affect.     LAB RESULTS  Lab Results (last 24 " hours)     Procedure Component Value Units Date/Time    POC Glucose Once [[id number]]  (Abnormal) Collected:  05/08/18 1209    Specimen:  Blood Updated:  05/08/18 1212     Glucose 205 (H) mg/dL     Narrative:       Meter: [id number] : [id number] Clark Leiheydi Fuerle NA    POC Glucose Once [[id number]]  (Abnormal) Collected:  05/08/18 1144    Specimen:  Blood Updated:  05/08/18 1147     Glucose 238 (H) mg/dL     Narrative:       Meter: [id number] : [id number] Clark Leiheydi Fuerle NA    POC Glucose Once [[id number]]  (Abnormal) Collected:  05/08/18 0723    Specimen:  Blood Updated:  05/08/18 0724     Glucose 136 (H) mg/dL     Narrative:       Meter: [id number] : [id number] Clark Leiheydi Fuerle NA    Basic Metabolic Panel [[id number]]  (Abnormal) Collected:  05/08/18 0542    Specimen:  Blood Updated:  05/08/18 0714     Glucose 121 (H) mg/dL      BUN 34 (H) mg/dL      Creatinine 0.83 mg/dL      Sodium 145 mmol/L      Potassium 3.8 mmol/L      Chloride 110 (H) mmol/L      CO2 21.9 (L) mmol/L      Calcium 10.3 mg/dL      eGFR Non African Amer 65 mL/min/1.73      BUN/Creatinine Ratio 41.0 (H)     Anion Gap 13.1 mmol/L     Narrative:       The MDRD GFR formula is only valid for adults with stable renal function between ages 18 and 70.    CBC & Differential [[id number]] Collected:  05/08/18 0543    Specimen:  Blood Updated:  05/08/18 0659    Narrative:       The following orders were created for panel order CBC & Differential.  Procedure                               Abnormality         Status                     ---------                               -----------         ------                     CBC Auto Differential[[id number]]        Abnormal            Final result                 Please view results for these tests on the individual orders.    CBC Auto Differential [[id number]]  (Abnormal) Collected:  05/08/18 0543    Specimen:  Blood Updated:  05/08/18 0659     WBC 12.58 (H) 10*3/mm3      RBC 4.41 10*6/mm3       Hemoglobin 12.6 g/dL      Hematocrit 39.3 %      MCV 89.1 fL      MCH 28.6 pg      MCHC 32.1 (L) g/dL      RDW 14.1 (H) %      RDW-SD 45.9 fl      MPV 10.5 fL      Platelets 422 10*3/mm3      Neutrophil % 79.4 (H) %      Lymphocyte % 13.4 (L) %      Monocyte % 5.8 %      Eosinophil % 0.7 %      Basophil % 0.2 %      Immature Grans % 0.5 %      Neutrophils, Absolute 9.99 (H) 10*3/mm3      Lymphocytes, Absolute 1.69 10*3/mm3      Monocytes, Absolute 0.73 10*3/mm3      Eosinophils, Absolute 0.09 10*3/mm3      Basophils, Absolute 0.02 10*3/mm3      Immature Grans, Absolute 0.06 (H) 10*3/mm3     POC Glucose Once [[id number]]  (Abnormal) Collected:  05/07/18 2126    Specimen:  Blood Updated:  05/07/18 2127     Glucose 163 (H) mg/dL     Narrative:       Meter: [id number] : [id number] Matthew Lewis RN    Blood Culture - Blood, [[id number]]  (Normal) Collected:  05/02/18 1939    Specimen:  Blood from Arm, Right Updated:  05/07/18 2000     Blood Culture No growth at 5 days    Blood Culture - Blood, [[id number]]  (Normal) Collected:  05/02/18 1904    Specimen:  Blood from Arm, Left Updated:  05/07/18 1915     Blood Culture No growth at 5 days    POC Glucose Once [[id number]]  (Normal) Collected:  05/07/18 1632    Specimen:  Blood Updated:  05/07/18 1634     Glucose 97 mg/dL     Narrative:       Meter: [id number] : [id number] Gloria KRAFT        Imaging Results (last 24 hours)     ** No results found for the last 24 hours. **        EKG                              Rhythm/Rate: sinus rhythm, 82  P waves and [place]: normal  QRS, axis: normal QRS, borderline prolonged QT   ST and T waves: Nonspecific        Current Facility-Administered Medications:   •  amLODIPine (NORVASC) tablet 10 mg, 10 mg, Oral, Daily, Misha Suero MD, 10 mg at 05/08/18 0945  •  ARIPiprazole (ABILIFY) tablet 5 mg, 5 mg, Oral, Nightly, Thomas Lambert III, MD  •  aspirin chewable tablet 81 mg, 81 mg, Oral, Daily, Misha Suero MD, 81 mg at  05/08/18 0945  •  atorvastatin (LIPITOR) tablet 10 mg, 10 mg, Oral, Daily, Misha Suero MD, 10 mg at 05/08/18 0945  •  hydrochlorothiazide (MICROZIDE) capsule 12.5 mg, 12.5 mg, Oral, Daily, Christopher Fuller MD, 12.5 mg at 05/08/18 0945  •  insulin aspart (novoLOG) injection 0-7 Units, 0-7 Units, Subcutaneous, 4x Daily With Meals & Nightly, Misha Suero MD, 3 Units at 05/08/18 1231  •  latanoprost (XALATAN) 0.005 % ophthalmic solution 1 drop, 1 drop, Both Eyes, Nightly, Misha Suero MD, 1 drop at 05/06/18 2130  •  levothyroxine (SYNTHROID, LEVOTHROID) tablet 25 mcg, 25 mcg, Oral, QAM, Misha Suero MD, 25 mcg at 05/06/18 1006  •  lisinopril (PRINIVIL,ZESTRIL) tablet 20 mg, 20 mg, Oral, Q24H, Verena Topete MD, 20 mg at 05/08/18 0945  •  multivitamin (THERAGRAN) tablet 1 tablet, 1 tablet, Oral, Daily, Misha Suero MD, 1 tablet at 05/08/18 0945  •  pantoprazole (PROTONIX) EC tablet 40 mg, 40 mg, Oral, QAM, Misha Suero MD, 40 mg at 05/06/18 1005  •  QUEtiapine (SEROquel) tablet 25 mg, 25 mg, Oral, 4x Daily PRN, Verena Topete MD  •  sodium bicarbonate tablet 650 mg, 650 mg, Oral, 4x Daily, Christopher Fuller MD, 650 mg at 05/08/18 1158  •  sodium chloride 0.9 % flush 10 mL, 10 mL, Intravenous, PRN, Obinna Fairbanks MD  •  timolol (TIMOPTIC) 0.5 % ophthalmic solution 1 drop, 1 drop, Both Eyes, Daily, Misha Suero MD, 1 drop at 05/08/18 1008  •  venlafaxine (EFFEXOR) tablet 75 mg, 75 mg, Oral, Daily, Misha Suero MD, 75 mg at 05/08/18 0945     ASSESSMENT  Acute kidney injury  Chronic kidney disease stage III  Metabolic encephalopathic  Diabetes mellitus  Hypertension  Hyperlipidemia  Hypothyroidism  Mood disorder  Anxiety disorder  Depression  Gastroesophageal reflux disease    PLAN  CPM  Stabilize blood pressure  Psych consult appreciated  Accu-Chek with sliding scale insulin  Supportive care  Stress ulcer DVT prophylaxis  PTOT  Discussed with family  Transferred to psych unit in a.m. if more  loren PIMENTEL MD

## 2018-05-08 NOTE — PLAN OF CARE
Problem: Patient Care Overview  Goal: Plan of Care Review  Outcome: Ongoing (interventions implemented as appropriate)   05/08/18 9902   Coping/Psychosocial   Plan of Care Reviewed With patient   Plan of Care Review   Progress no change   OTHER   Outcome Summary Pt very withdrawn and flat and uncooperative this shift. Will not speak at all or make eye contact. Daughter in room, assisting pt with eating. Wants to know when DR will round on her so that one of the family members will be there because they have questions. Gave them DR. Garcia card he had left in the room. Refused oral medication and eye drops. Let me give her insulin. When asked if she's okay, pt shakes head no. When asked what's wrong, still shakes head no. When asked her name, she will either be silent or shake her head no. Only interaction from her has been silence of head shakes. Shakes head 'no' when asked if in pain. Resting in bed now, Will continue to monitorpt.     Goal: Individualization and Mutuality  Outcome: Ongoing (interventions implemented as appropriate)    Goal: Discharge Needs Assessment  Outcome: Ongoing (interventions implemented as appropriate)    Goal: Interprofessional Rounds/Family Conf  Outcome: Ongoing (interventions implemented as appropriate)      Problem: Skin Injury Risk (Adult)  Goal: Skin Health and Integrity  Outcome: Ongoing (interventions implemented as appropriate)      Problem: Confusion, Acute (Adult)  Goal: Safety  Outcome: Ongoing (interventions implemented as appropriate)      Problem: Renal Failure/Kidney Injury, Acute (Adult)  Goal: Signs and Symptoms of Listed Potential Problems Will be Absent, Minimized or Managed (Renal Failure/Kidney Injury, Acute)  Outcome: Ongoing (interventions implemented as appropriate)      Problem: Fall Risk (Adult)  Goal: Absence of Fall  Outcome: Ongoing (interventions implemented as appropriate)

## 2018-05-08 NOTE — PROGRESS NOTES
"   LOS: 6 days   Patient Care Team:  Madelaine Parisi MD as PCP - General (Family Medicine)    Chief Complaint/ Reason for encounter: Acute renal failure, severe dehydration  Chief Complaint   Patient presents with   • Altered Mental Status         Subjective     Altered Mental Status   Associated symptoms include weakness. Pertinent negatives include no chest pain or fever.       Subjective:  Symptoms:  Improved.  She reports weakness.  No shortness of breath or chest pain.  (She seems to be doing better today  More alert, awake, following commands, improved eye contact  Still  will not verbally communicate).    Diet:  Poor intake.    Activity level: Impaired due to weakness.    Pain:  She reports no pain.    More alert and communicative when family is there      History taken from: Family and chart    Objective     Vital Signs  Temp:  [97.9 °F (36.6 °C)-98.5 °F (36.9 °C)] 98.5 °F (36.9 °C)  Heart Rate:  [93-97] 93  Resp:  [16] 16  BP: (156-190)/() 190/97       Wt Readings from Last 1 Encounters:   05/03/18 1317 55.8 kg (123 lb)   05/02/18 2230 55.9 kg (123 lb 4.8 oz)   05/02/18 1737 65.8 kg (145 lb)       Objective:  General Appearance:  Comfortable, in no acute distress and not in pain (Chronically ill-appearing).    Vital signs: (most recent): Blood pressure (!) 190/97, pulse 93, temperature 98.5 °F (36.9 °C), temperature source Oral, resp. rate 16, height 157.5 cm (62\"), weight 55.8 kg (123 lb), SpO2 95 %.  Vital signs are normal.  No fever.    Output: Producing urine.    HEENT: Normal HEENT exam.    Lungs:  Normal effort and normal respiratory rate.  Breath sounds clear to auscultation.  She is not in respiratory distress.  No decreased breath sounds.    Heart: Normal rate.  Regular rhythm.  S1 normal.  No murmur.   Abdomen: Abdomen is soft and non-distended.  Bowel sounds are normal.   There is no abdominal tenderness.     Extremities: Normal range of motion.  There is no deformity or dependent edema. "    Pulses: Distal pulses are intact.    Neurological: Patient is alert.    Skin:  Warm and dry.  No rash or cyanosis.             Results Review:    Past Medical History: reviewed  Past Medical History:   Diagnosis Date   • Depression    • Diabetes mellitus     TYPE 2   • Disease of thyroid gland     HYPOTHYROID   • Glaucoma    • Hyperlipidemia    • Hypertension    • Insomnia    • Mood disorder          Allergies:  Allergies   Allergen Reactions   • Prozac [Fluoxetine Hcl] Unknown (See Comments)     Off of nursing home paperwork       Intake/Output:     Intake/Output Summary (Last 24 hours) at 05/08/18 1337  Last data filed at 05/08/18 1002   Gross per 24 hour   Intake                0 ml   Output              200 ml   Net             -200 ml         DATA:  Radiology and Labs:  The following labs independently reviewed by me.  Interval notes, chart personally reviewed by me.   Old records independently reviewed showing Normal prior renal function, no chronic kidney disease  Discussed with her family members at bedside and provided additional history, past medical history        Labs:   Recent Results (from the past 24 hour(s))   POC Glucose Once    Collection Time: 05/07/18  4:32 PM   Result Value Ref Range    Glucose 97 70 - 130 mg/dL   POC Glucose Once    Collection Time: 05/07/18  9:26 PM   Result Value Ref Range    Glucose 163 (H) 70 - 130 mg/dL   Basic Metabolic Panel    Collection Time: 05/08/18  5:42 AM   Result Value Ref Range    Glucose 121 (H) 65 - 99 mg/dL    BUN 34 (H) 8 - 23 mg/dL    Creatinine 0.83 0.57 - 1.00 mg/dL    Sodium 145 136 - 145 mmol/L    Potassium 3.8 3.5 - 5.2 mmol/L    Chloride 110 (H) 98 - 107 mmol/L    CO2 21.9 (L) 22.0 - 29.0 mmol/L    Calcium 10.3 8.6 - 10.5 mg/dL    eGFR Non African Amer 65 >60 mL/min/1.73    BUN/Creatinine Ratio 41.0 (H) 7.0 - 25.0    Anion Gap 13.1 mmol/L   CBC Auto Differential    Collection Time: 05/08/18  5:43 AM   Result Value Ref Range    WBC 12.58 (H) 4.50 -  10.70 10*3/mm3    RBC 4.41 3.90 - 5.20 10*6/mm3    Hemoglobin 12.6 11.9 - 15.5 g/dL    Hematocrit 39.3 35.6 - 45.5 %    MCV 89.1 80.5 - 98.2 fL    MCH 28.6 26.9 - 32.0 pg    MCHC 32.1 (L) 32.4 - 36.3 g/dL    RDW 14.1 (H) 11.7 - 13.0 %    RDW-SD 45.9 37.0 - 54.0 fl    MPV 10.5 6.0 - 12.0 fL    Platelets 422 140 - 500 10*3/mm3    Neutrophil % 79.4 (H) 42.7 - 76.0 %    Lymphocyte % 13.4 (L) 19.6 - 45.3 %    Monocyte % 5.8 5.0 - 12.0 %    Eosinophil % 0.7 0.3 - 6.2 %    Basophil % 0.2 0.0 - 1.5 %    Immature Grans % 0.5 0.0 - 0.5 %    Neutrophils, Absolute 9.99 (H) 1.90 - 8.10 10*3/mm3    Lymphocytes, Absolute 1.69 0.90 - 4.80 10*3/mm3    Monocytes, Absolute 0.73 0.20 - 1.20 10*3/mm3    Eosinophils, Absolute 0.09 0.00 - 0.70 10*3/mm3    Basophils, Absolute 0.02 0.00 - 0.20 10*3/mm3    Immature Grans, Absolute 0.06 (H) 0.00 - 0.03 10*3/mm3   POC Glucose Once    Collection Time: 05/08/18  7:23 AM   Result Value Ref Range    Glucose 136 (H) 70 - 130 mg/dL   POC Glucose Once    Collection Time: 05/08/18 11:44 AM   Result Value Ref Range    Glucose 238 (H) 70 - 130 mg/dL   POC Glucose Once    Collection Time: 05/08/18 12:09 PM   Result Value Ref Range    Glucose 205 (H) 70 - 130 mg/dL       Radiology:  Imaging Results (last 24 hours)     ** No results found for the last 24 hours. **             Medications have been reviewed:  Current Facility-Administered Medications   Medication Dose Route Frequency Provider Last Rate Last Dose   • amLODIPine (NORVASC) tablet 10 mg  10 mg Oral Daily Misha Suero MD   10 mg at 05/08/18 0945   • ARIPiprazole (ABILIFY) tablet 5 mg  5 mg Oral Nightly Thomas Lambert III, MD       • aspirin chewable tablet 81 mg  81 mg Oral Daily Misha Suero MD   81 mg at 05/08/18 0945   • atorvastatin (LIPITOR) tablet 10 mg  10 mg Oral Daily Misha Suero MD   10 mg at 05/08/18 0945   • hydrALAZINE (APRESOLINE) tablet 25 mg  25 mg Oral Q8H Misha Suero MD       • hydrochlorothiazide (MICROZIDE)  capsule 12.5 mg  12.5 mg Oral Daily Christopher Fuller MD   12.5 mg at 05/08/18 0945   • insulin aspart (novoLOG) injection 0-7 Units  0-7 Units Subcutaneous 4x Daily With Meals & Nightly Misha Suero MD   3 Units at 05/08/18 1231   • insulin detemir (LEVEMIR) injection 10 Units  10 Units Subcutaneous Nightly Misha Suero MD       • latanoprost (XALATAN) 0.005 % ophthalmic solution 1 drop  1 drop Both Eyes Nightly Misha Suero MD   1 drop at 05/06/18 2130   • levothyroxine (SYNTHROID, LEVOTHROID) tablet 25 mcg  25 mcg Oral QAM Misha Suero MD   25 mcg at 05/06/18 1006   • lisinopril (PRINIVIL,ZESTRIL) tablet 20 mg  20 mg Oral Q24H Verena Topete MD   20 mg at 05/08/18 0945   • multivitamin (THERAGRAN) tablet 1 tablet  1 tablet Oral Daily Misha Suero MD   1 tablet at 05/08/18 0945   • pantoprazole (PROTONIX) EC tablet 40 mg  40 mg Oral QAM Misha Suero MD   40 mg at 05/06/18 1005   • QUEtiapine (SEROquel) tablet 25 mg  25 mg Oral 4x Daily PRN Verena Topete MD       • sodium bicarbonate tablet 650 mg  650 mg Oral 4x Daily Christopher Fuller MD   650 mg at 05/08/18 1158   • sodium chloride 0.9 % flush 10 mL  10 mL Intravenous PRN Obinna Fairbanks MD       • timolol (TIMOPTIC) 0.5 % ophthalmic solution 1 drop  1 drop Both Eyes Daily Misha Suero MD   1 drop at 05/08/18 1008   • venlafaxine (EFFEXOR) tablet 75 mg  75 mg Oral Daily Misha Suero MD   75 mg at 05/08/18 0945       ASSESSMENT:  Acute renal failure, Much improved, near baseline  dehydration, Much better, oral intake improved  Mild leukocytosis  metabolic acidosis, possibly from isotonic fluids,  better  Proteinuria, around 2 g per quantification, likely from hypertension  Chronic hypertension, better controlled today  Type 2 diabetes mellitus  Probable stage II to 3 chronic kidney disease     Plan:   Her renal function has improved, near baseline  Patient has been refusing blood pressure medications until family arrives  After her meds were  given her blood pressure has improved  Continue current antihypertensive regimen for now   encourage compliance with medications and treatment  Continue to encourage oral fluid intake, sodium bicarbonate, hold NSAIDs  Check labs prn  Discussed with family at bedside    Continue to monitor renal function, electroytes and volume closely   Please call me with any questions or concerns    Christopher Fuller MD   [facility]   Office phone number: [phone]  Answering service phone number: [phone]    05/08/18  1:37 PM      Dictation performed using Dragon dictation software

## 2018-05-08 NOTE — CONSULTS
IDENTIFYING INFORMATION: The patient is an 86-year-old white female admitted with dehydration and acute renal failure.  She had exhibited mental status changes while an inpatient at the [facility].    CHIEF COMPLAINT:  None given    INFORMANT:  Chart and family, patient does not speak during attempted interview.    RELIABILITY:  Good    HISTORY OF PRESENT ILLNESS: The patient is an 86-year-old white female with a long history of psychiatric illness.  She had been seen by Dr. Zaheer Mccarthy in the past but more recently has been seen by Dr. Manuel.  Her psychotropic medications at the time of admission included Abilify and Effexor, though Abilify has not been continued during this hospital stay.  According to the patient's son, the patient had been feeling weak and ill and was insisting on going to local emergency rooms so no source of the patient's feelings of weakness were noted.  The patient was referred to the [facility] and was at that facility when she began to exhibit mental status changes.  These were thought to attend related to acute renal failure and dehydration which has not been addressed.  As the patient stay in the hospital is progressed however the patient's become more and more withdrawn and is now refusing to speak with staff.  According to the son, the patient harbors the delusion that staff is holding her hostage and is poisoning her food.  This, according to the son, accounts for her refusal to comply with treatment or a today's attempted interview.  Patient had been living alone independently a but family has made arrangements for an assisted living facility upon her discharge from the hospital.  There is no reported history of use of alcohol tobacco or street drugs.  The patient's appetite has been  reduced per family.    PAST PSYCHIATRIC HISTORY: As above    PAST MEDICAL HISTORY:  Significant for dehydration and acute renal failure which is resolving.  The patient also suffers from  hypothyroidism, diabetes mellitus, glaucoma, hyperlipidemia and hypertension.    MEDICATIONS:   Current Facility-Administered Medications   Medication Dose Route Frequency Provider Last Rate Last Dose   • amLODIPine (NORVASC) tablet 10 mg  10 mg Oral Daily Misha Suero MD   10 mg at 05/07/18 1016   • ARIPiprazole (ABILIFY) tablet 5 mg  5 mg Oral Nightly Thomas Lambert III, MD       • aspirin chewable tablet 81 mg  81 mg Oral Daily Misha Suero MD   81 mg at 05/07/18 1017   • atorvastatin (LIPITOR) tablet 10 mg  10 mg Oral Daily Misha Suero MD   10 mg at 05/07/18 1016   • hydrochlorothiazide (MICROZIDE) capsule 12.5 mg  12.5 mg Oral Daily Christopher Fuller MD       • insulin aspart (novoLOG) injection 0-7 Units  0-7 Units Subcutaneous 4x Daily With Meals & Nightly Misha Suero MD   2 Units at 05/07/18 2132   • latanoprost (XALATAN) 0.005 % ophthalmic solution 1 drop  1 drop Both Eyes Nightly Misha Suero MD   1 drop at 05/06/18 2130   • levothyroxine (SYNTHROID, LEVOTHROID) tablet 25 mcg  25 mcg Oral QAM Misha Suero MD   25 mcg at 05/06/18 1006   • lisinopril (PRINIVIL,ZESTRIL) tablet 20 mg  20 mg Oral Q24H Verena Topete MD   20 mg at 05/07/18 1016   • multivitamin (THERAGRAN) tablet 1 tablet  1 tablet Oral Daily Misha Suero MD   1 tablet at 05/07/18 1016   • pantoprazole (PROTONIX) EC tablet 40 mg  40 mg Oral QAM Misha Suero MD   40 mg at 05/06/18 1005   • QUEtiapine (SEROquel) tablet 25 mg  25 mg Oral 4x Daily PRN Verena Topete MD       • sodium bicarbonate tablet 650 mg  650 mg Oral 4x Daily Christopher Fuller MD       • sodium chloride 0.9 % flush 10 mL  10 mL Intravenous PRN Obinna Fairbanks MD       • timolol (TIMOPTIC) 0.5 % ophthalmic solution 1 drop  1 drop Both Eyes Daily Misha Suero MD   1 drop at 05/07/18 1017   • venlafaxine (EFFEXOR) tablet 75 mg  75 mg Oral Daily Misha Suero MD   75 mg at 05/07/18 1016         ALLERGIES:  Prozac    FAMILY HISTORY:   Noncontributory    SOCIAL HISTORY: The patient has been living alone.  There is no reported use of alcohol tobacco or street drugs.    MENTAL STATUS EXAM: The patient is an elderly white female who is a bed.  In response to questions posed by this physician the patient only shakes her head no but does not interact verbally with this physician.  Additionally, the patient's son reports that just prior to this physician entering the room the patient been speaking freely with him.    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    PLAN:  I will go ahead and restart the patient's Abilify and we will look to transfer the patient to the crisis management unit once she is medically stabilized.  The patient's son has provided permission for the patient to remain hospitalized for 14 day hold if this becomes necessary.    Thank you very much for the opportunity to see this patient.

## 2018-05-08 NOTE — SIGNIFICANT NOTE
05/08/18 1338   Rehab Treatment   Discipline occupational therapist   Reason Treatment Not Performed other (see comments)  (Pt refusing therapy at this time. Nsg and PT just attempted to have pt participate. 1329)

## 2018-05-09 ENCOUNTER — HOSPITAL ENCOUNTER (INPATIENT)
Facility: HOSPITAL | Age: 83
LOS: 20 days | Discharge: NURSING, REHAB OR LONG-TERM CARE | End: 2018-05-29
Attending: SPECIALIST | Admitting: SPECIALIST

## 2018-05-09 VITALS
DIASTOLIC BLOOD PRESSURE: 94 MMHG | SYSTOLIC BLOOD PRESSURE: 171 MMHG | BODY MASS INDEX: 22.63 KG/M2 | OXYGEN SATURATION: 96 % | WEIGHT: 123 LBS | HEART RATE: 82 BPM | HEIGHT: 62 IN | TEMPERATURE: 97.2 F | RESPIRATION RATE: 18 BRPM

## 2018-05-09 DIAGNOSIS — F33.3 DEPRESSION, MAJOR, RECURRENT, SEVERE WITH PSYCHOSIS (HCC): Primary | ICD-10-CM

## 2018-05-09 DIAGNOSIS — Z74.09 IMPAIRED FUNCTIONAL MOBILITY, BALANCE, GAIT, AND ENDURANCE: ICD-10-CM

## 2018-05-09 LAB
ANION GAP SERPL CALCULATED.3IONS-SCNC: 13.5 MMOL/L
BASOPHILS # BLD AUTO: 0.02 10*3/MM3 (ref 0–0.2)
BASOPHILS NFR BLD AUTO: 0.1 % (ref 0–1.5)
BUN BLD-MCNC: 27 MG/DL (ref 8–23)
BUN/CREAT SERPL: 32.9 (ref 7–25)
CALCIUM SPEC-SCNC: 10.7 MG/DL (ref 8.6–10.5)
CHLORIDE SERPL-SCNC: 104 MMOL/L (ref 98–107)
CO2 SERPL-SCNC: 25.5 MMOL/L (ref 22–29)
CREAT BLD-MCNC: 0.82 MG/DL (ref 0.57–1)
DEPRECATED RDW RBC AUTO: 45.3 FL (ref 37–54)
EOSINOPHIL # BLD AUTO: 0.13 10*3/MM3 (ref 0–0.7)
EOSINOPHIL NFR BLD AUTO: 0.9 % (ref 0.3–6.2)
ERYTHROCYTE [DISTWIDTH] IN BLOOD BY AUTOMATED COUNT: 14.1 % (ref 11.7–13)
GFR SERPL CREATININE-BSD FRML MDRD: 66 ML/MIN/1.73
GLUCOSE BLD-MCNC: 123 MG/DL (ref 65–99)
GLUCOSE BLDC GLUCOMTR-MCNC: 109 MG/DL (ref 70–130)
GLUCOSE BLDC GLUCOMTR-MCNC: 147 MG/DL (ref 70–130)
GLUCOSE BLDC GLUCOMTR-MCNC: 232 MG/DL (ref 70–130)
HCT VFR BLD AUTO: 39.7 % (ref 35.6–45.5)
HGB BLD-MCNC: 13 G/DL (ref 11.9–15.5)
IMM GRANULOCYTES # BLD: 0.05 10*3/MM3 (ref 0–0.03)
IMM GRANULOCYTES NFR BLD: 0.3 % (ref 0–0.5)
LYMPHOCYTES # BLD AUTO: 2.13 10*3/MM3 (ref 0.9–4.8)
LYMPHOCYTES NFR BLD AUTO: 14.5 % (ref 19.6–45.3)
MCH RBC QN AUTO: 29 PG (ref 26.9–32)
MCHC RBC AUTO-ENTMCNC: 32.7 G/DL (ref 32.4–36.3)
MCV RBC AUTO: 88.6 FL (ref 80.5–98.2)
MONOCYTES # BLD AUTO: 0.9 10*3/MM3 (ref 0.2–1.2)
MONOCYTES NFR BLD AUTO: 6.1 % (ref 5–12)
NEUTROPHILS # BLD AUTO: 11.48 10*3/MM3 (ref 1.9–8.1)
NEUTROPHILS NFR BLD AUTO: 78.1 % (ref 42.7–76)
PLATELET # BLD AUTO: 410 10*3/MM3 (ref 140–500)
PMV BLD AUTO: 10.5 FL (ref 6–12)
POTASSIUM BLD-SCNC: 3.4 MMOL/L (ref 3.5–5.2)
RBC # BLD AUTO: 4.48 10*6/MM3 (ref 3.9–5.2)
SODIUM BLD-SCNC: 143 MMOL/L (ref 136–145)
WBC NRBC COR # BLD: 14.71 10*3/MM3 (ref 4.5–10.7)

## 2018-05-09 PROCEDURE — 82962 GLUCOSE BLOOD TEST: CPT

## 2018-05-09 PROCEDURE — 80048 BASIC METABOLIC PNL TOTAL CA: CPT | Performed by: HOSPITALIST

## 2018-05-09 PROCEDURE — 93005 ELECTROCARDIOGRAM TRACING: CPT | Performed by: SPECIALIST

## 2018-05-09 PROCEDURE — 85025 COMPLETE CBC W/AUTO DIFF WBC: CPT | Performed by: HOSPITALIST

## 2018-05-09 PROCEDURE — 93010 ELECTROCARDIOGRAM REPORT: CPT | Performed by: INTERNAL MEDICINE

## 2018-05-09 RX ORDER — LEVOTHYROXINE SODIUM 0.03 MG/1
25 TABLET ORAL
Status: DISCONTINUED | OUTPATIENT
Start: 2018-05-10 | End: 2018-05-10

## 2018-05-09 RX ORDER — HYDRALAZINE HYDROCHLORIDE 50 MG/1
50 TABLET, FILM COATED ORAL EVERY 8 HOURS SCHEDULED
Qty: 90 TABLET | Refills: 0 | Status: SHIPPED | OUTPATIENT
Start: 2018-05-09 | End: 2018-05-29 | Stop reason: HOSPADM

## 2018-05-09 RX ORDER — HYDRALAZINE HYDROCHLORIDE 50 MG/1
50 TABLET, FILM COATED ORAL EVERY 8 HOURS SCHEDULED
Status: DISPENSED | OUTPATIENT
Start: 2018-05-09 | End: 2018-05-10

## 2018-05-09 RX ORDER — SODIUM BICARBONATE 650 MG/1
650 TABLET ORAL 4 TIMES DAILY
Status: DISCONTINUED | OUTPATIENT
Start: 2018-05-09 | End: 2018-05-10

## 2018-05-09 RX ORDER — LATANOPROST 50 UG/ML
1 SOLUTION/ DROPS OPHTHALMIC NIGHTLY
Status: DISCONTINUED | OUTPATIENT
Start: 2018-05-09 | End: 2018-05-10

## 2018-05-09 RX ORDER — TIMOLOL MALEATE 5 MG/ML
1 SOLUTION/ DROPS OPHTHALMIC EVERY 12 HOURS SCHEDULED
Status: DISPENSED | OUTPATIENT
Start: 2018-05-09 | End: 2018-05-10

## 2018-05-09 RX ORDER — HYDRALAZINE HYDROCHLORIDE 50 MG/1
50 TABLET, FILM COATED ORAL EVERY 8 HOURS SCHEDULED
Status: DISCONTINUED | OUTPATIENT
Start: 2018-05-09 | End: 2018-05-09 | Stop reason: HOSPADM

## 2018-05-09 RX ORDER — ASPIRIN 81 MG/1
81 TABLET, CHEWABLE ORAL DAILY
Qty: 30 TABLET | Refills: 0 | Status: SHIPPED | OUTPATIENT
Start: 2018-05-10 | End: 2018-06-09

## 2018-05-09 RX ORDER — ARIPIPRAZOLE 5 MG/1
5 TABLET ORAL NIGHTLY
Status: DISCONTINUED | OUTPATIENT
Start: 2018-05-09 | End: 2018-05-10

## 2018-05-09 RX ORDER — ARIPIPRAZOLE 5 MG/1
5 TABLET ORAL ONCE
Status: DISCONTINUED | OUTPATIENT
Start: 2018-05-09 | End: 2018-05-09

## 2018-05-09 RX ORDER — QUETIAPINE FUMARATE 25 MG/1
25 TABLET, FILM COATED ORAL 4 TIMES DAILY PRN
Qty: 10 TABLET | Refills: 0 | Status: ON HOLD | OUTPATIENT
Start: 2018-05-09 | End: 2018-05-09 | Stop reason: ALTCHOICE

## 2018-05-09 RX ORDER — SODIUM BICARBONATE 650 MG/1
650 TABLET ORAL 4 TIMES DAILY
Qty: 120 TABLET | Refills: 0 | Status: ON HOLD | OUTPATIENT
Start: 2018-05-09 | End: 2018-05-11

## 2018-05-09 RX ORDER — LEVOTHYROXINE SODIUM 0.03 MG/1
25 TABLET ORAL EVERY MORNING
Qty: 30 TABLET | Refills: 0 | Status: SHIPPED | OUTPATIENT
Start: 2018-05-10 | End: 2018-06-09

## 2018-05-09 RX ORDER — AMLODIPINE BESYLATE 10 MG/1
10 TABLET ORAL DAILY
Qty: 30 TABLET | Refills: 0 | Status: SHIPPED | OUTPATIENT
Start: 2018-05-10 | End: 2018-05-29 | Stop reason: HOSPADM

## 2018-05-09 RX ADMIN — HYDRALAZINE HYDROCHLORIDE 50 MG: 50 TABLET, FILM COATED ORAL at 17:53

## 2018-05-09 RX ADMIN — SODIUM BICARBONATE 650 MG: 650 TABLET ORAL at 17:53

## 2018-05-09 RX ADMIN — SODIUM BICARBONATE 650 MG: 650 TABLET ORAL at 11:29

## 2018-05-09 RX ADMIN — LEVOTHYROXINE SODIUM 25 MCG: 25 TABLET ORAL at 11:30

## 2018-05-09 RX ADMIN — ASPIRIN 81 MG: 81 TABLET, CHEWABLE ORAL at 11:30

## 2018-05-09 RX ADMIN — LISINOPRIL: 20 TABLET ORAL at 17:52

## 2018-05-09 RX ADMIN — Medication 1 TABLET: at 11:30

## 2018-05-09 RX ADMIN — ATORVASTATIN CALCIUM 10 MG: 10 TABLET, FILM COATED ORAL at 11:30

## 2018-05-09 RX ADMIN — HYDROCHLOROTHIAZIDE 12.5 MG: 12.5 CAPSULE, GELATIN COATED ORAL at 11:29

## 2018-05-09 RX ADMIN — VENLAFAXINE HYDROCHLORIDE 75 MG: 75 TABLET ORAL at 11:29

## 2018-05-09 RX ADMIN — LISINOPRIL 20 MG: 20 TABLET ORAL at 11:29

## 2018-05-09 RX ADMIN — AMLODIPINE BESYLATE 10 MG: 10 TABLET ORAL at 11:29

## 2018-05-09 NOTE — PROGRESS NOTES
"Daily progress note    Chief complaint  Doing  better   No specific complaint    History of present illness  86-year-old white female with multiple medical problems including diabetes mellitus mellitus hypertension hyperlipidemia anxiety disorder and depression hypothyroidism mood disorder glaucoma brought to the emergency room by the family with altered mental status going on for last 1 week.  Patient lives by herself and pretty independent.  No fever chills no chest pain shortness of breath abdominal pain nausea vomiting diarrhea.  Patient evaluated in ER found to be in acute kidney injury admitted for management.     REVIEW OF SYSTEMS  Review of Systems   Unable to perform ROS: Mental status change      PHYSICAL EXAM  Blood pressure 171/94, pulse 82, temperature 97.2 °F (36.2 °C), temperature source Oral, resp. rate 18, height 157.5 cm (62\"), weight 55.8 kg (123 lb), SpO2 96 %.    Constitutional: She is oriented to person, place, and time and well-developed, well-nourished, and in no distress. No distress.   Flat affect.  Doesn't speak unless asked a direct question.  Very limited history obtained from patient.     Head: Normocephalic and atraumatic.   Eyes: EOM are normal. Pupils are equal, round, and reactive to light.   Neck: Normal range of motion. Neck supple.   Cardiovascular: Normal rate, regular rhythm, normal heart sounds and intact distal pulses.    Pulmonary/Chest: Effort normal. No respiratory distress.   Abdominal: Soft. There is no tenderness. There is no rebound and no guarding.   Musculoskeletal: Normal range of motion. She exhibits no edema.   Neurological: She is alert and oriented to person, place, and time. She has normal sensation and normal strength.   Pt is oriented x2 on exam  PT is slow to respond, but answers questions appropriately and follows commands    Skin: Skin is warm and dry. No rash noted.   Psychiatric: She has a flat affect.     LAB RESULTS  Lab Results (last 24 hours)     " Procedure Component Value Units Date/Time    POC Glucose Once [[id number]]  (Normal) Collected:  05/09/18 0739    Specimen:  Blood Updated:  05/09/18 0741     Glucose 109 mg/dL     Narrative:       Meter: [id number] : [id number] Eduardo KRAFT    Basic Metabolic Panel [[id number]]  (Abnormal) Collected:  05/09/18 0558    Specimen:  Blood Updated:  05/09/18 0648     Glucose 123 (H) mg/dL      BUN 27 (H) mg/dL      Creatinine 0.82 mg/dL      Sodium 143 mmol/L      Potassium 3.4 (L) mmol/L      Chloride 104 mmol/L      CO2 25.5 mmol/L      Calcium 10.7 (H) mg/dL      eGFR Non African Amer 66 mL/min/1.73      BUN/Creatinine Ratio 32.9 (H)     Anion Gap 13.5 mmol/L     Narrative:       The MDRD GFR formula is only valid for adults with stable renal function between ages 18 and 70.    CBC & Differential [[id number]] Collected:  05/09/18 0558    Specimen:  Blood Updated:  05/09/18 0630    Narrative:       The following orders were created for panel order CBC & Differential.  Procedure                               Abnormality         Status                     ---------                               -----------         ------                     CBC Auto Differential[[id number]]        Abnormal            Final result                 Please view results for these tests on the individual orders.    CBC Auto Differential [[id number]]  (Abnormal) Collected:  05/09/18 0558    Specimen:  Blood Updated:  05/09/18 0630     WBC 14.71 (H) 10*3/mm3      RBC 4.48 10*6/mm3      Hemoglobin 13.0 g/dL      Hematocrit 39.7 %      MCV 88.6 fL      MCH 29.0 pg      MCHC 32.7 g/dL      RDW 14.1 (H) %      RDW-SD 45.3 fl      MPV 10.5 fL      Platelets 410 10*3/mm3      Neutrophil % 78.1 (H) %      Lymphocyte % 14.5 (L) %      Monocyte % 6.1 %      Eosinophil % 0.9 %      Basophil % 0.1 %      Immature Grans % 0.3 %      Neutrophils, Absolute 11.48 (H) 10*3/mm3      Lymphocytes, Absolute 2.13 10*3/mm3      Monocytes, Absolute 0.90  10*3/mm3      Eosinophils, Absolute 0.13 10*3/mm3      Basophils, Absolute 0.02 10*3/mm3      Immature Grans, Absolute 0.05 (H) 10*3/mm3     POC Glucose Once [[id number]]  (Abnormal) Collected:  05/08/18 2132    Specimen:  Blood Updated:  05/08/18 2135     Glucose 190 (H) mg/dL     Narrative:       Meter: [id number] : [id number] Andrew Canelai NA    POC Glucose Once [[id number]]  (Abnormal) Collected:  05/08/18 1628    Specimen:  Blood Updated:  05/08/18 1629     Glucose 133 (H) mg/dL     Narrative:       Meter: [id number] : [id number] Eduardo Doervladislav NA    POC Glucose Once [[id number]]  (Abnormal) Collected:  05/08/18 1209    Specimen:  Blood Updated:  05/08/18 1212     Glucose 205 (H) mg/dL     Narrative:       Meter: [id number] : [id number] Eduardo KRAFT        Imaging Results (last 24 hours)     ** No results found for the last 24 hours. **        EKG                              Rhythm/Rate: sinus rhythm, 82  P waves and KS: normal  QRS, axis: normal QRS, borderline prolonged QT   ST and T waves: Nonspecific        Current Facility-Administered Medications:   •  amLODIPine (NORVASC) tablet 10 mg, 10 mg, Oral, Daily, Misha Suero MD, 10 mg at 05/09/18 1129  •  ARIPiprazole (ABILIFY) tablet 5 mg, 5 mg, Oral, Nightly, Thomas Lambert III, MD, 5 mg at 05/08/18 2056  •  aspirin chewable tablet 81 mg, 81 mg, Oral, Daily, Misha Suero MD, 81 mg at 05/09/18 1130  •  atorvastatin (LIPITOR) tablet 10 mg, 10 mg, Oral, Daily, Misha Suero MD, 10 mg at 05/09/18 1130  •  hydrALAZINE (APRESOLINE) tablet 25 mg, 25 mg, Oral, Q8H, Misha Suero MD, 25 mg at 05/08/18 2100  •  hydrochlorothiazide (MICROZIDE) capsule 12.5 mg, 12.5 mg, Oral, Daily, Christopher Fuller MD, 12.5 mg at 05/09/18 1129  •  insulin aspart (novoLOG) injection 0-7 Units, 0-7 Units, Subcutaneous, 4x Daily With Meals & Nightly, Misha Suero MD, 3 Units at 05/08/18 1231  •  insulin detemir (LEVEMIR) injection 10 Units, 10 Units,  Subcutaneous, Nightly, Jonah Pimentel MD  •  latanoprost (XALATAN) 0.005 % ophthalmic solution 1 drop, 1 drop, Both Eyes, Nightly, Jonah Pimentel MD, 1 drop at 05/08/18 2056  •  levothyroxine (SYNTHROID, LEVOTHROID) tablet 25 mcg, 25 mcg, Oral, QAM, Jonah Pimentel MD, 25 mcg at 05/09/18 1130  •  lisinopril (PRINIVIL,ZESTRIL) tablet 20 mg, 20 mg, Oral, Q24H, Verena Topete MD, 20 mg at 05/09/18 1129  •  multivitamin (THERAGRAN) tablet 1 tablet, 1 tablet, Oral, Daily, Jonah Pimentel MD, 1 tablet at 05/09/18 1130  •  pantoprazole (PROTONIX) EC tablet 40 mg, 40 mg, Oral, QAM, Jonah Pimentel MD, 40 mg at 05/06/18 1005  •  QUEtiapine (SEROquel) tablet 25 mg, 25 mg, Oral, 4x Daily PRN, Verena Topete MD  •  sodium bicarbonate tablet 650 mg, 650 mg, Oral, 4x Daily, Christopher Fuller MD, 650 mg at 05/09/18 1129  •  sodium chloride 0.9 % flush 10 mL, 10 mL, Intravenous, PRN, Obinna Fairbanks MD  •  timolol (TIMOPTIC) 0.5 % ophthalmic solution 1 drop, 1 drop, Both Eyes, Daily, Jonah Pimentel MD, 1 drop at 05/08/18 1008  •  venlafaxine (EFFEXOR) tablet 75 mg, 75 mg, Oral, Daily, Jonah Pimentel MD, 75 mg at 05/09/18 1129     ASSESSMENT  Acute kidney injury  Chronic kidney disease stage III  Metabolic encephalopathic  Diabetes mellitus  Hypertension  Hyperlipidemia  Hypothyroidism  Mood disorder  Anxiety disorder  Depression  Gastroesophageal reflux disease    PLAN  Discharge to inpatient psych unit  Discharge summary dictated    JONAH PIMENTEL MD

## 2018-05-09 NOTE — PROGRESS NOTES
"   LOS: 7 days   Patient Care Team:  Madelaine Parisi MD as PCP - General (Family Medicine)    Chief Complaint/ Reason for encounter: Acute renal failure, severe dehydration  Chief Complaint   Patient presents with   • Altered Mental Status         Subjective     Altered Mental Status   Associated symptoms include weakness. Pertinent negatives include no chest pain or fever.       Subjective:  Symptoms:  Stable.  She reports weakness.  No shortness of breath or chest pain.  (  following commands, improved eye contact  Still  will not verbally communicate).    Diet:  Poor intake.    Activity level: Impaired due to weakness.    Pain:  She reports no pain.    More alert and communicative when family is there      History taken from: Family and chart    Objective     Vital Signs  Temp:  [97.2 °F (36.2 °C)-98.1 °F (36.7 °C)] 97.2 °F (36.2 °C)  Heart Rate:  [82-93] 82  Resp:  [18] 18  BP: (156-171)/(91-96) 171/94       Wt Readings from Last 1 Encounters:   05/03/18 1317 55.8 kg (123 lb)   05/02/18 2230 55.9 kg (123 lb 4.8 oz)   05/02/18 1737 65.8 kg (145 lb)       Objective:  General Appearance:  Comfortable, in no acute distress and not in pain (Chronically ill-appearing).    Vital signs: (most recent): Blood pressure 171/94, pulse 82, temperature 97.2 °F (36.2 °C), temperature source Oral, resp. rate 18, height 157.5 cm (62\"), weight 55.8 kg (123 lb), SpO2 96 %.  Vital signs are normal.  No fever.    Output: Producing urine.    HEENT: Normal HEENT exam.    Lungs:  Normal effort and normal respiratory rate.  Breath sounds clear to auscultation.  She is not in respiratory distress.  No decreased breath sounds.    Heart: Normal rate.  Regular rhythm.  S1 normal.  No murmur.   Abdomen: Abdomen is soft and non-distended.  Bowel sounds are normal.   There is no abdominal tenderness.     Extremities: Normal range of motion.  There is no deformity or dependent edema.    Pulses: Distal pulses are intact.    Neurological: " Patient is alert.    Skin:  Warm and dry.  No rash or cyanosis.             Results Review:    Past Medical History: reviewed  Past Medical History:   Diagnosis Date   • Depression    • Diabetes mellitus     TYPE 2   • Disease of thyroid gland     HYPOTHYROID   • Glaucoma    • Hyperlipidemia    • Hypertension    • Insomnia    • Mood disorder          Allergies:  Allergies   Allergen Reactions   • Prozac [Fluoxetine Hcl] Unknown (See Comments)     Off of nursing home paperwork       Intake/Output:     Intake/Output Summary (Last 24 hours) at 05/09/18 1404  Last data filed at 05/09/18 0532   Gross per 24 hour   Intake                0 ml   Output                0 ml   Net                0 ml         DATA:  Radiology and Labs:  The following labs independently reviewed by me.  Interval notes, chart personally reviewed by me.   Old records independently reviewed showing Normal prior renal function, no chronic kidney disease  Discussed with her family members at bedside and provided additional history, past medical history        Labs:   Recent Results (from the past 24 hour(s))   POC Glucose Once    Collection Time: 05/08/18  4:28 PM   Result Value Ref Range    Glucose 133 (H) 70 - 130 mg/dL   POC Glucose Once    Collection Time: 05/08/18  9:32 PM   Result Value Ref Range    Glucose 190 (H) 70 - 130 mg/dL   Basic Metabolic Panel    Collection Time: 05/09/18  5:58 AM   Result Value Ref Range    Glucose 123 (H) 65 - 99 mg/dL    BUN 27 (H) 8 - 23 mg/dL    Creatinine 0.82 0.57 - 1.00 mg/dL    Sodium 143 136 - 145 mmol/L    Potassium 3.4 (L) 3.5 - 5.2 mmol/L    Chloride 104 98 - 107 mmol/L    CO2 25.5 22.0 - 29.0 mmol/L    Calcium 10.7 (H) 8.6 - 10.5 mg/dL    eGFR Non African Amer 66 >60 mL/min/1.73    BUN/Creatinine Ratio 32.9 (H) 7.0 - 25.0    Anion Gap 13.5 mmol/L   CBC Auto Differential    Collection Time: 05/09/18  5:58 AM   Result Value Ref Range    WBC 14.71 (H) 4.50 - 10.70 10*3/mm3    RBC 4.48 3.90 - 5.20 10*6/mm3     Hemoglobin 13.0 11.9 - 15.5 g/dL    Hematocrit 39.7 35.6 - 45.5 %    MCV 88.6 80.5 - 98.2 fL    MCH 29.0 26.9 - 32.0 pg    MCHC 32.7 32.4 - 36.3 g/dL    RDW 14.1 (H) 11.7 - 13.0 %    RDW-SD 45.3 37.0 - 54.0 fl    MPV 10.5 6.0 - 12.0 fL    Platelets 410 140 - 500 10*3/mm3    Neutrophil % 78.1 (H) 42.7 - 76.0 %    Lymphocyte % 14.5 (L) 19.6 - 45.3 %    Monocyte % 6.1 5.0 - 12.0 %    Eosinophil % 0.9 0.3 - 6.2 %    Basophil % 0.1 0.0 - 1.5 %    Immature Grans % 0.3 0.0 - 0.5 %    Neutrophils, Absolute 11.48 (H) 1.90 - 8.10 10*3/mm3    Lymphocytes, Absolute 2.13 0.90 - 4.80 10*3/mm3    Monocytes, Absolute 0.90 0.20 - 1.20 10*3/mm3    Eosinophils, Absolute 0.13 0.00 - 0.70 10*3/mm3    Basophils, Absolute 0.02 0.00 - 0.20 10*3/mm3    Immature Grans, Absolute 0.05 (H) 0.00 - 0.03 10*3/mm3   POC Glucose Once    Collection Time: 05/09/18  7:39 AM   Result Value Ref Range    Glucose 109 70 - 130 mg/dL   POC Glucose Once    Collection Time: 05/09/18 12:02 PM   Result Value Ref Range    Glucose 232 (H) 70 - 130 mg/dL       Radiology:  Imaging Results (last 24 hours)     ** No results found for the last 24 hours. **             Medications have been reviewed:  Current Facility-Administered Medications   Medication Dose Route Frequency Provider Last Rate Last Dose   • amLODIPine (NORVASC) tablet 10 mg  10 mg Oral Daily Misha Suero MD   10 mg at 05/09/18 1129   • ARIPiprazole (ABILIFY) tablet 5 mg  5 mg Oral Nightly Thomas Lambert III, MD   5 mg at 05/08/18 2056   • aspirin chewable tablet 81 mg  81 mg Oral Daily Misha Suero MD   81 mg at 05/09/18 1130   • atorvastatin (LIPITOR) tablet 10 mg  10 mg Oral Daily Misha Suero MD   10 mg at 05/09/18 1130   • hydrALAZINE (APRESOLINE) tablet 50 mg  50 mg Oral Q8H Misha Suero MD       • hydrochlorothiazide (MICROZIDE) capsule 12.5 mg  12.5 mg Oral Daily Christopher Fuller MD   12.5 mg at 05/09/18 1129   • insulin detemir (LEVEMIR) injection 10 Units  10 Units  Subcutaneous Nightly Misha Suero MD       • latanoprost (XALATAN) 0.005 % ophthalmic solution 1 drop  1 drop Both Eyes Nightly Misha Suero MD   1 drop at 05/08/18 2056   • levothyroxine (SYNTHROID, LEVOTHROID) tablet 25 mcg  25 mcg Oral QAM Misha Suero MD   25 mcg at 05/09/18 1130   • lisinopril (PRINIVIL,ZESTRIL) tablet 20 mg  20 mg Oral Q24H Verena Topete MD   20 mg at 05/09/18 1129   • multivitamin (THERAGRAN) tablet 1 tablet  1 tablet Oral Daily Misha Suero MD   1 tablet at 05/09/18 1130   • pantoprazole (PROTONIX) EC tablet 40 mg  40 mg Oral QAM Misha Suero MD   40 mg at 05/06/18 1005   • QUEtiapine (SEROquel) tablet 25 mg  25 mg Oral 4x Daily PRN Verena Topete MD       • sodium bicarbonate tablet 650 mg  650 mg Oral 4x Daily Christopher Fuller MD   650 mg at 05/09/18 1129   • timolol (TIMOPTIC) 0.5 % ophthalmic solution 1 drop  1 drop Both Eyes Daily Misha Suero MD   1 drop at 05/08/18 1008   • venlafaxine (EFFEXOR) tablet 75 mg  75 mg Oral Daily Misha Suero MD   75 mg at 05/09/18 1129       ASSESSMENT:  Acute renal failure, Much improved, near baseline  dehydration, Much better, oral intake improved  Hypokalemia  Mild hypercalcemia  metabolic acidosis, possibly from isotonic fluids,  better  Proteinuria, around 2 g per quantification, likely from hypertension  Chronic hypertension, better controlled today, refusing po meds  Type 2 diabetes mellitus  stage II  chronic kidney disease     Plan:   Her renal function has improved, near baseline  Patient has been refusing blood pressure medications until family arrives   Continue current antihypertensive regimen for now   encourage compliance with medications and treatment  Continue to encourage oral fluid intake, sodium bicarbonate, hold NSAIDs  Check labs next week  OK for D/C to inpt psych unit    Continue to monitor renal function, electroytes and volume closely   Please call me with any questions or concerns    Christopher Fuller MD    [facility]   Office phone number: [phone]  Answering service phone number: [phone]    05/09/18  2:04 PM      Dictation performed using Dragon dictation software

## 2018-05-09 NOTE — PROGRESS NOTES
Continued Stay Note   [facility]     Patient Name: Ivett Lacy  MRN: [id number]  Today's Date: 5/9/2018    Admit Date: 5/2/2018          Discharge Plan     Row Name 05/09/18 0952       Plan    Plan Plan [facility].   JONI Pham    Patient/Family in Agreement with Plan yes    Plan Comments Spoke with pt's son and POA   (David Eason  [phone]) per phone.   David (son) states that Dr. Wilson stated his plan of care would be to transfer pt to [facility] after medically cleared.   David is in agreeable with plan for admission to [facility] after medically clear.  Plan [facility].   JONI Pham              Discharge Codes    No documentation.           Karishma Robert, RN

## 2018-05-09 NOTE — SIGNIFICANT NOTE
"   05/09/18 0832   Rehab Treatment   Discipline physical therapy assistant   Reason Treatment Not Performed patient/family declined treatment  (Pt refusing PT unwilling to do anything even bed mobility to get to EOB refusing to eat and states that \"it is too late for her\")   Recommendation   PT - Next Appointment 05/10/18     "

## 2018-05-09 NOTE — DISCHARGE SUMMARY
Discharge summary    Date of admission 5/2/2019  Date of discharge 5/9/2018     Final diagnosis   Acute kidney injury resolved  Chronic kidney disease stage III  Metabolic encephalopathic  Diabetes mellitus  Hypertension  Hyperlipidemia  Hypothyroidism  Mood disorder  Anxiety disorder  Depression  Gastroesophageal reflux disease    Discharge medications     Current Facility-Administered Medications:   •  amLODIPine (NORVASC) tablet 10 mg, 10 mg, Oral, Daily, Misha Suero MD, 10 mg at 05/09/18 1129  •  ARIPiprazole (ABILIFY) tablet 5 mg, 5 mg, Oral, Nightly, Thomas Lambert III, MD, 5 mg at 05/08/18 2056  •  aspirin chewable tablet 81 mg, 81 mg, Oral, Daily, Misha Suero MD, 81 mg at 05/09/18 1130  •  atorvastatin (LIPITOR) tablet 10 mg, 10 mg, Oral, Daily, Misha Suero MD, 10 mg at 05/09/18 1130  •  hydrALAZINE (APRESOLINE) tablet 50 mg, 50 mg, Oral, Q8H, Misha Suero MD  •  hydrochlorothiazide (MICROZIDE) capsule 12.5 mg, 12.5 mg, Oral, Daily, Christopher Fuller MD, 12.5 mg at 05/09/18 1129  •  insulin detemir (LEVEMIR) injection 10 Units, 10 Units, Subcutaneous, Nightly, Misha Suero MD  •  latanoprost (XALATAN) 0.005 % ophthalmic solution 1 drop, 1 drop, Both Eyes, Nightly, Misha Suero MD, 1 drop at 05/08/18 2056  •  levothyroxine (SYNTHROID, LEVOTHROID) tablet 25 mcg, 25 mcg, Oral, QAM, Misha Suero MD, 25 mcg at 05/09/18 1130  •  lisinopril (PRINIVIL,ZESTRIL) tablet 20 mg, 20 mg, Oral, Q24H, Verena Topete MD, 20 mg at 05/09/18 1129  •  multivitamin (THERAGRAN) tablet 1 tablet, 1 tablet, Oral, Daily, Misha Suero MD, 1 tablet at 05/09/18 1130  •  pantoprazole (PROTONIX) EC tablet 40 mg, 40 mg, Oral, QAM, Misha Suero MD, 40 mg at 05/06/18 1005  •  QUEtiapine (SEROquel) tablet 25 mg, 25 mg, Oral, 4x Daily PRN, Verena Topete MD  •  sodium bicarbonate tablet 650 mg, 650 mg, Oral, 4x Daily, Christopher Fuller MD, 650 mg at 05/09/18 1129  •  timolol (TIMOPTIC) 0.5 % ophthalmic solution  1 drop, 1 drop, Both Eyes, Daily, Jonah Suero MD, 1 drop at 05/08/18 1008  •  venlafaxine (EFFEXOR) tablet 75 mg, 75 mg, Oral, Daily, Jonah Suero MD, 75 mg at 05/09/18 1129     Consult obtained   Nephrology   Psychiatry     Procedures   None     Hospital course   86-year-old white female with multiple medical problems including diabetes hypertension hyperlipidemia anxiety disorder depression admitted through emergency room with altered mental status.  Patient evaluated found to be in acute kidney injury admitted for management.  Patient admitted she was taking nonsteroidal anti-inflammatory agent diuretics  ACE inhibitor and Glucophage which are always stopped and she received fluids.  She'll followed by nephrology.  Patient kidney function returned to normal and her mental status improved but she remained confused and agitated at times.  Patient followed by psychiatry and they recommended inpatient psych treatment.  Patient is medically stable and will be transferred to inpatient psych unit on above medication.  Patient ACE inhibitor and hydrochlorothiazide restarted.  Patient clear with nephrology to be discharged to inpatient psych unit.     Discharge diet soft textures chopped thin     Activity as tolerated with supervision     Medication as above     Further care per inpatient psych M.D. and I will follow the patient for medical problems     JONAH SUERO MD

## 2018-05-09 NOTE — PROGRESS NOTES
Case Management Discharge Note    Final Note: Pt to be admitted to [facility].  JONI Pham    Destination     No service coordination in this encounter.      Durable Medical Equipment     No service coordination in this encounter.      Dialysis/Infusion     No service coordination in this encounter.      Home Medical Care     Service Request Status Selected Specialties Address Phone Number Fax Number    [facility] Accepted N/A [address] [id number] [phone]      Social Care     No service coordination in this encounter.             Final Discharge Disposition Code: 65 - psychiatric hospital or unit

## 2018-05-09 NOTE — PLAN OF CARE
Problem: Patient Care Overview  Goal: Plan of Care Review  Outcome: Ongoing (interventions implemented as appropriate)   05/09/18 0431   Coping/Psychosocial   Plan of Care Reviewed With patient   Plan of Care Review   Progress no change   OTHER   Outcome Summary pt resting quietly in bed. alert and oriented x4. paranoid with meds. more talkative this shift. will continue to monitor.      Goal: Individualization and Mutuality  Outcome: Ongoing (interventions implemented as appropriate)    Goal: Discharge Needs Assessment  Outcome: Ongoing (interventions implemented as appropriate)      Problem: Skin Injury Risk (Adult)  Goal: Skin Health and Integrity  Outcome: Ongoing (interventions implemented as appropriate)      Problem: Confusion, Acute (Adult)  Goal: Cognitive/Functional Impairments Minimized  Outcome: Ongoing (interventions implemented as appropriate)    Goal: Safety  Outcome: Ongoing (interventions implemented as appropriate)      Problem: Renal Failure/Kidney Injury, Acute (Adult)  Goal: Signs and Symptoms of Listed Potential Problems Will be Absent, Minimized or Managed (Renal Failure/Kidney Injury, Acute)  Outcome: Ongoing (interventions implemented as appropriate)      Problem: Fall Risk (Adult)  Goal: Absence of Fall  Outcome: Ongoing (interventions implemented as appropriate)

## 2018-05-10 LAB
GLUCOSE BLDC GLUCOMTR-MCNC: 120 MG/DL (ref 70–130)
GLUCOSE BLDC GLUCOMTR-MCNC: 120 MG/DL (ref 70–130)
GLUCOSE BLDC GLUCOMTR-MCNC: 121 MG/DL (ref 70–130)
GLUCOSE BLDC GLUCOMTR-MCNC: 97 MG/DL (ref 70–130)

## 2018-05-10 PROCEDURE — 82962 GLUCOSE BLOOD TEST: CPT

## 2018-05-10 RX ORDER — TIMOLOL MALEATE 5 MG/ML
1 SOLUTION/ DROPS OPHTHALMIC EVERY 12 HOURS SCHEDULED
Status: DISCONTINUED | OUTPATIENT
Start: 2018-05-10 | End: 2018-05-10

## 2018-05-10 RX ORDER — PANTOPRAZOLE SODIUM 40 MG/1
40 TABLET, DELAYED RELEASE ORAL
Status: DISCONTINUED | OUTPATIENT
Start: 2018-05-11 | End: 2018-05-29 | Stop reason: HOSPADM

## 2018-05-10 RX ORDER — TIMOLOL MALEATE 5 MG/ML
1 SOLUTION/ DROPS OPHTHALMIC EVERY 12 HOURS SCHEDULED
Status: DISCONTINUED | OUTPATIENT
Start: 2018-05-10 | End: 2018-05-29 | Stop reason: HOSPADM

## 2018-05-10 RX ORDER — VENLAFAXINE HYDROCHLORIDE 75 MG/1
75 CAPSULE, EXTENDED RELEASE ORAL
Status: DISCONTINUED | OUTPATIENT
Start: 2018-05-10 | End: 2018-05-16

## 2018-05-10 RX ORDER — ARIPIPRAZOLE 5 MG/1
5 TABLET ORAL DAILY
Status: DISCONTINUED | OUTPATIENT
Start: 2018-05-10 | End: 2018-05-14

## 2018-05-10 RX ORDER — HYDRALAZINE HYDROCHLORIDE 50 MG/1
50 TABLET, FILM COATED ORAL EVERY 8 HOURS SCHEDULED
Status: DISCONTINUED | OUTPATIENT
Start: 2018-05-10 | End: 2018-05-10

## 2018-05-10 RX ORDER — LEVOTHYROXINE SODIUM 0.03 MG/1
25 TABLET ORAL
Status: DISCONTINUED | OUTPATIENT
Start: 2018-05-11 | End: 2018-05-29 | Stop reason: HOSPADM

## 2018-05-10 RX ADMIN — VENLAFAXINE HYDROCHLORIDE 75 MG: 75 CAPSULE, EXTENDED RELEASE ORAL at 11:12

## 2018-05-10 RX ADMIN — TIMOLOL MALEATE 1 DROP: 5 SOLUTION/ DROPS OPHTHALMIC at 20:50

## 2018-05-10 RX ADMIN — LISINOPRIL: 20 TABLET ORAL at 16:19

## 2018-05-10 RX ADMIN — TIMOLOL MALEATE 1 DROP: 5 SOLUTION/ DROPS OPHTHALMIC at 11:24

## 2018-05-10 RX ADMIN — METFORMIN HYDROCHLORIDE 500 MG: 500 TABLET ORAL at 09:40

## 2018-05-10 RX ADMIN — ARIPIPRAZOLE 5 MG: 5 TABLET ORAL at 16:20

## 2018-05-10 RX ADMIN — METFORMIN HYDROCHLORIDE 500 MG: 500 TABLET ORAL at 18:39

## 2018-05-10 RX ADMIN — HYDRALAZINE HYDROCHLORIDE 75 MG: 25 TABLET ORAL at 21:12

## 2018-05-10 RX ADMIN — HYDRALAZINE HYDROCHLORIDE 50 MG: 50 TABLET, FILM COATED ORAL at 13:44

## 2018-05-11 LAB
ANION GAP SERPL CALCULATED.3IONS-SCNC: 13.1 MMOL/L
BASOPHILS # BLD AUTO: 0.02 10*3/MM3 (ref 0–0.2)
BASOPHILS NFR BLD AUTO: 0.2 % (ref 0–1.5)
BUN BLD-MCNC: 37 MG/DL (ref 8–23)
BUN/CREAT SERPL: 35.9 (ref 7–25)
CALCIUM SPEC-SCNC: 10.2 MG/DL (ref 8.6–10.5)
CHLORIDE SERPL-SCNC: 104 MMOL/L (ref 98–107)
CO2 SERPL-SCNC: 25.9 MMOL/L (ref 22–29)
CREAT BLD-MCNC: 1.03 MG/DL (ref 0.57–1)
DEPRECATED RDW RBC AUTO: 47.3 FL (ref 37–54)
EOSINOPHIL # BLD AUTO: 0.23 10*3/MM3 (ref 0–0.7)
EOSINOPHIL NFR BLD AUTO: 1.8 % (ref 0.3–6.2)
ERYTHROCYTE [DISTWIDTH] IN BLOOD BY AUTOMATED COUNT: 14.4 % (ref 11.7–13)
GFR SERPL CREATININE-BSD FRML MDRD: 51 ML/MIN/1.73
GLUCOSE BLD-MCNC: 124 MG/DL (ref 65–99)
GLUCOSE BLDC GLUCOMTR-MCNC: 106 MG/DL (ref 70–130)
GLUCOSE BLDC GLUCOMTR-MCNC: 132 MG/DL (ref 70–130)
HCT VFR BLD AUTO: 40.2 % (ref 35.6–45.5)
HGB BLD-MCNC: 12.6 G/DL (ref 11.9–15.5)
IMM GRANULOCYTES # BLD: 0.04 10*3/MM3 (ref 0–0.03)
IMM GRANULOCYTES NFR BLD: 0.3 % (ref 0–0.5)
LYMPHOCYTES # BLD AUTO: 1.56 10*3/MM3 (ref 0.9–4.8)
LYMPHOCYTES NFR BLD AUTO: 12.3 % (ref 19.6–45.3)
MCH RBC QN AUTO: 28.4 PG (ref 26.9–32)
MCHC RBC AUTO-ENTMCNC: 31.3 G/DL (ref 32.4–36.3)
MCV RBC AUTO: 90.7 FL (ref 80.5–98.2)
MONOCYTES # BLD AUTO: 0.86 10*3/MM3 (ref 0.2–1.2)
MONOCYTES NFR BLD AUTO: 6.8 % (ref 5–12)
NEUTROPHILS # BLD AUTO: 9.99 10*3/MM3 (ref 1.9–8.1)
NEUTROPHILS NFR BLD AUTO: 78.6 % (ref 42.7–76)
PLATELET # BLD AUTO: 367 10*3/MM3 (ref 140–500)
PMV BLD AUTO: 10.3 FL (ref 6–12)
POTASSIUM BLD-SCNC: 3.7 MMOL/L (ref 3.5–5.2)
RBC # BLD AUTO: 4.43 10*6/MM3 (ref 3.9–5.2)
SODIUM BLD-SCNC: 143 MMOL/L (ref 136–145)
TSH SERPL DL<=0.05 MIU/L-ACNC: 2.1 MIU/ML (ref 0.27–4.2)
WBC NRBC COR # BLD: 12.7 10*3/MM3 (ref 4.5–10.7)

## 2018-05-11 PROCEDURE — 80048 BASIC METABOLIC PNL TOTAL CA: CPT | Performed by: HOSPITALIST

## 2018-05-11 PROCEDURE — 84443 ASSAY THYROID STIM HORMONE: CPT | Performed by: HOSPITALIST

## 2018-05-11 PROCEDURE — 85025 COMPLETE CBC W/AUTO DIFF WBC: CPT | Performed by: HOSPITALIST

## 2018-05-11 PROCEDURE — 82962 GLUCOSE BLOOD TEST: CPT

## 2018-05-11 RX ORDER — LATANOPROST 50 UG/ML
1 SOLUTION/ DROPS OPHTHALMIC NIGHTLY
Status: DISCONTINUED | OUTPATIENT
Start: 2018-05-11 | End: 2018-05-29 | Stop reason: HOSPADM

## 2018-05-11 RX ORDER — ASPIRIN 81 MG/1
81 TABLET, CHEWABLE ORAL DAILY
Status: DISCONTINUED | OUTPATIENT
Start: 2018-05-11 | End: 2018-05-29 | Stop reason: HOSPADM

## 2018-05-11 RX ADMIN — METFORMIN HYDROCHLORIDE 500 MG: 500 TABLET ORAL at 17:13

## 2018-05-11 RX ADMIN — ASPIRIN 81 MG: 81 TABLET, CHEWABLE ORAL at 17:13

## 2018-05-11 RX ADMIN — HYDRALAZINE HYDROCHLORIDE 75 MG: 25 TABLET ORAL at 15:01

## 2018-05-11 RX ADMIN — LEVOTHYROXINE SODIUM 25 MCG: 25 TABLET ORAL at 06:00

## 2018-05-11 RX ADMIN — PANTOPRAZOLE SODIUM 40 MG: 40 TABLET, DELAYED RELEASE ORAL at 06:00

## 2018-05-11 RX ADMIN — HYDRALAZINE HYDROCHLORIDE 75 MG: 25 TABLET ORAL at 06:00

## 2018-05-12 LAB
ANION GAP SERPL CALCULATED.3IONS-SCNC: 15 MMOL/L
BASOPHILS # BLD AUTO: 0.01 10*3/MM3 (ref 0–0.2)
BASOPHILS NFR BLD AUTO: 0.1 % (ref 0–1.5)
BUN BLD-MCNC: 41 MG/DL (ref 8–23)
BUN/CREAT SERPL: 38 (ref 7–25)
CALCIUM SPEC-SCNC: 10.8 MG/DL (ref 8.6–10.5)
CHLORIDE SERPL-SCNC: 106 MMOL/L (ref 98–107)
CO2 SERPL-SCNC: 26 MMOL/L (ref 22–29)
CREAT BLD-MCNC: 1.08 MG/DL (ref 0.57–1)
DEPRECATED RDW RBC AUTO: 48.2 FL (ref 37–54)
EOSINOPHIL # BLD AUTO: 0.09 10*3/MM3 (ref 0–0.7)
EOSINOPHIL NFR BLD AUTO: 0.7 % (ref 0.3–6.2)
ERYTHROCYTE [DISTWIDTH] IN BLOOD BY AUTOMATED COUNT: 14.7 % (ref 11.7–13)
GFR SERPL CREATININE-BSD FRML MDRD: 48 ML/MIN/1.73
GLUCOSE BLD-MCNC: 131 MG/DL (ref 65–99)
GLUCOSE BLDC GLUCOMTR-MCNC: 124 MG/DL (ref 70–130)
GLUCOSE BLDC GLUCOMTR-MCNC: 168 MG/DL (ref 70–130)
GLUCOSE BLDC GLUCOMTR-MCNC: 178 MG/DL (ref 70–130)
GLUCOSE BLDC GLUCOMTR-MCNC: 98 MG/DL (ref 70–130)
HCT VFR BLD AUTO: 41.6 % (ref 35.6–45.5)
HGB BLD-MCNC: 13.1 G/DL (ref 11.9–15.5)
IMM GRANULOCYTES # BLD: 0.02 10*3/MM3 (ref 0–0.03)
IMM GRANULOCYTES NFR BLD: 0.1 % (ref 0–0.5)
LYMPHOCYTES # BLD AUTO: 1.61 10*3/MM3 (ref 0.9–4.8)
LYMPHOCYTES NFR BLD AUTO: 12 % (ref 19.6–45.3)
MCH RBC QN AUTO: 28.8 PG (ref 26.9–32)
MCHC RBC AUTO-ENTMCNC: 31.5 G/DL (ref 32.4–36.3)
MCV RBC AUTO: 91.4 FL (ref 80.5–98.2)
MONOCYTES # BLD AUTO: 0.85 10*3/MM3 (ref 0.2–1.2)
MONOCYTES NFR BLD AUTO: 6.3 % (ref 5–12)
NEUTROPHILS # BLD AUTO: 10.83 10*3/MM3 (ref 1.9–8.1)
NEUTROPHILS NFR BLD AUTO: 80.8 % (ref 42.7–76)
PLATELET # BLD AUTO: 390 10*3/MM3 (ref 140–500)
PMV BLD AUTO: 10.6 FL (ref 6–12)
POTASSIUM BLD-SCNC: 4 MMOL/L (ref 3.5–5.2)
RBC # BLD AUTO: 4.55 10*6/MM3 (ref 3.9–5.2)
SODIUM BLD-SCNC: 147 MMOL/L (ref 136–145)
WBC NRBC COR # BLD: 13.41 10*3/MM3 (ref 4.5–10.7)

## 2018-05-12 PROCEDURE — 82962 GLUCOSE BLOOD TEST: CPT

## 2018-05-12 PROCEDURE — 85025 COMPLETE CBC W/AUTO DIFF WBC: CPT | Performed by: HOSPITALIST

## 2018-05-12 PROCEDURE — 63710000001 INSULIN ASPART PER 5 UNITS: Performed by: HOSPITALIST

## 2018-05-12 PROCEDURE — 97166 OT EVAL MOD COMPLEX 45 MIN: CPT | Performed by: OCCUPATIONAL THERAPIST

## 2018-05-12 PROCEDURE — 97161 PT EVAL LOW COMPLEX 20 MIN: CPT | Performed by: PHYSICAL THERAPIST

## 2018-05-12 PROCEDURE — 97535 SELF CARE MNGMENT TRAINING: CPT | Performed by: OCCUPATIONAL THERAPIST

## 2018-05-12 PROCEDURE — 97110 THERAPEUTIC EXERCISES: CPT | Performed by: PHYSICAL THERAPIST

## 2018-05-12 PROCEDURE — 80048 BASIC METABOLIC PNL TOTAL CA: CPT | Performed by: HOSPITALIST

## 2018-05-12 RX ORDER — ACETAMINOPHEN 325 MG/1
650 TABLET ORAL EVERY 6 HOURS PRN
Status: DISCONTINUED | OUTPATIENT
Start: 2018-05-12 | End: 2018-05-29 | Stop reason: HOSPADM

## 2018-05-12 RX ORDER — AMLODIPINE BESYLATE 2.5 MG/1
5 TABLET ORAL
Status: DISCONTINUED | OUTPATIENT
Start: 2018-05-12 | End: 2018-05-15

## 2018-05-12 RX ORDER — LISINOPRIL 20 MG/1
20 TABLET ORAL
Status: DISCONTINUED | OUTPATIENT
Start: 2018-05-12 | End: 2018-05-14

## 2018-05-12 RX ORDER — HYDRALAZINE HYDROCHLORIDE 50 MG/1
100 TABLET, FILM COATED ORAL EVERY 8 HOURS SCHEDULED
Status: DISCONTINUED | OUTPATIENT
Start: 2018-05-12 | End: 2018-05-15

## 2018-05-12 RX ORDER — LORAZEPAM 0.5 MG/1
0.5 TABLET ORAL 3 TIMES DAILY
Status: DISCONTINUED | OUTPATIENT
Start: 2018-05-12 | End: 2018-05-13

## 2018-05-12 RX ADMIN — INSULIN ASPART 2 UNITS: 100 INJECTION, SOLUTION INTRAVENOUS; SUBCUTANEOUS at 13:05

## 2018-05-12 RX ADMIN — INSULIN ASPART 2 UNITS: 100 INJECTION, SOLUTION INTRAVENOUS; SUBCUTANEOUS at 21:45

## 2018-05-12 RX ADMIN — ASPIRIN 81 MG: 81 TABLET, CHEWABLE ORAL at 08:59

## 2018-05-12 RX ADMIN — HYDRALAZINE HYDROCHLORIDE 100 MG: 50 TABLET, FILM COATED ORAL at 21:06

## 2018-05-12 RX ADMIN — LEVOTHYROXINE SODIUM 25 MCG: 25 TABLET ORAL at 08:59

## 2018-05-12 RX ADMIN — LORAZEPAM 0.5 MG: 0.5 TABLET ORAL at 21:07

## 2018-05-12 RX ADMIN — LATANOPROST 1 DROP: 50 SOLUTION OPHTHALMIC at 21:05

## 2018-05-12 RX ADMIN — LISINOPRIL: 20 TABLET ORAL at 08:58

## 2018-05-12 RX ADMIN — AMLODIPINE BESYLATE 5 MG: 2.5 TABLET ORAL at 16:42

## 2018-05-12 RX ADMIN — HYDRALAZINE HYDROCHLORIDE 75 MG: 25 TABLET ORAL at 13:05

## 2018-05-12 RX ADMIN — LORAZEPAM 0.5 MG: 0.5 TABLET ORAL at 16:42

## 2018-05-12 RX ADMIN — TIMOLOL MALEATE 1 DROP: 5 SOLUTION/ DROPS OPHTHALMIC at 08:58

## 2018-05-12 RX ADMIN — PANTOPRAZOLE SODIUM 40 MG: 40 TABLET, DELAYED RELEASE ORAL at 09:00

## 2018-05-12 RX ADMIN — METFORMIN HYDROCHLORIDE 500 MG: 500 TABLET ORAL at 08:59

## 2018-05-12 RX ADMIN — ARIPIPRAZOLE 5 MG: 5 TABLET ORAL at 08:59

## 2018-05-12 RX ADMIN — TIMOLOL MALEATE 1 DROP: 5 SOLUTION/ DROPS OPHTHALMIC at 21:06

## 2018-05-12 RX ADMIN — VENLAFAXINE HYDROCHLORIDE 75 MG: 75 CAPSULE, EXTENDED RELEASE ORAL at 08:59

## 2018-05-12 RX ADMIN — ACETAMINOPHEN 650 MG: 325 TABLET, FILM COATED ORAL at 21:05

## 2018-05-13 LAB
ANION GAP SERPL CALCULATED.3IONS-SCNC: 17.4 MMOL/L
BUN BLD-MCNC: 54 MG/DL (ref 8–23)
BUN/CREAT SERPL: 39.1 (ref 7–25)
C DIFF TOX GENS STL QL NAA+PROBE: NEGATIVE
CALCIUM SPEC-SCNC: 10.6 MG/DL (ref 8.6–10.5)
CHLORIDE SERPL-SCNC: 105 MMOL/L (ref 98–107)
CO2 SERPL-SCNC: 22.6 MMOL/L (ref 22–29)
CREAT BLD-MCNC: 1.38 MG/DL (ref 0.57–1)
GFR SERPL CREATININE-BSD FRML MDRD: 36 ML/MIN/1.73
GLUCOSE BLD-MCNC: 122 MG/DL (ref 65–99)
GLUCOSE BLDC GLUCOMTR-MCNC: 125 MG/DL (ref 70–130)
GLUCOSE BLDC GLUCOMTR-MCNC: 126 MG/DL (ref 70–130)
GLUCOSE BLDC GLUCOMTR-MCNC: 164 MG/DL (ref 70–130)
GLUCOSE BLDC GLUCOMTR-MCNC: 187 MG/DL (ref 70–130)
POTASSIUM BLD-SCNC: 4.2 MMOL/L (ref 3.5–5.2)
SODIUM BLD-SCNC: 145 MMOL/L (ref 136–145)

## 2018-05-13 PROCEDURE — 87493 C DIFF AMPLIFIED PROBE: CPT | Performed by: HOSPITALIST

## 2018-05-13 PROCEDURE — 82962 GLUCOSE BLOOD TEST: CPT

## 2018-05-13 PROCEDURE — 63710000001 INSULIN ASPART PER 5 UNITS: Performed by: HOSPITALIST

## 2018-05-13 PROCEDURE — 80048 BASIC METABOLIC PNL TOTAL CA: CPT | Performed by: HOSPITALIST

## 2018-05-13 RX ORDER — SODIUM CHLORIDE 450 MG/100ML
50 INJECTION, SOLUTION INTRAVENOUS CONTINUOUS
Status: DISCONTINUED | OUTPATIENT
Start: 2018-05-13 | End: 2018-05-14

## 2018-05-13 RX ORDER — ALPRAZOLAM 0.5 MG/1
0.25 TABLET ORAL 3 TIMES DAILY
Status: DISCONTINUED | OUTPATIENT
Start: 2018-05-13 | End: 2018-05-29 | Stop reason: HOSPADM

## 2018-05-13 RX ADMIN — LEVOTHYROXINE SODIUM 25 MCG: 25 TABLET ORAL at 12:23

## 2018-05-13 RX ADMIN — ASPIRIN 81 MG: 81 TABLET, CHEWABLE ORAL at 12:23

## 2018-05-13 RX ADMIN — ARIPIPRAZOLE 5 MG: 5 TABLET ORAL at 12:22

## 2018-05-13 RX ADMIN — INSULIN ASPART 2 UNITS: 100 INJECTION, SOLUTION INTRAVENOUS; SUBCUTANEOUS at 12:33

## 2018-05-13 RX ADMIN — LISINOPRIL 20 MG: 20 TABLET ORAL at 12:22

## 2018-05-13 RX ADMIN — SODIUM CHLORIDE 75 ML/HR: 4.5 INJECTION, SOLUTION INTRAVENOUS at 14:21

## 2018-05-13 RX ADMIN — LATANOPROST 1 DROP: 50 SOLUTION OPHTHALMIC at 21:44

## 2018-05-13 RX ADMIN — ALPRAZOLAM 0.25 MG: 0.5 TABLET ORAL at 21:44

## 2018-05-13 RX ADMIN — HYDRALAZINE HYDROCHLORIDE 100 MG: 50 TABLET, FILM COATED ORAL at 05:48

## 2018-05-13 RX ADMIN — PANTOPRAZOLE SODIUM 40 MG: 40 TABLET, DELAYED RELEASE ORAL at 12:22

## 2018-05-13 RX ADMIN — INSULIN ASPART 2 UNITS: 100 INJECTION, SOLUTION INTRAVENOUS; SUBCUTANEOUS at 21:44

## 2018-05-13 RX ADMIN — TIMOLOL MALEATE 1 DROP: 5 SOLUTION/ DROPS OPHTHALMIC at 21:44

## 2018-05-13 RX ADMIN — VENLAFAXINE HYDROCHLORIDE 75 MG: 75 CAPSULE, EXTENDED RELEASE ORAL at 12:22

## 2018-05-13 RX ADMIN — AMLODIPINE BESYLATE 5 MG: 2.5 TABLET ORAL at 12:23

## 2018-05-14 LAB
ANION GAP SERPL CALCULATED.3IONS-SCNC: 14.4 MMOL/L
ANION GAP SERPL CALCULATED.3IONS-SCNC: 14.7 MMOL/L
BACTERIA UR QL AUTO: ABNORMAL /HPF
BILIRUB UR QL STRIP: NEGATIVE
BUN BLD-MCNC: 62 MG/DL (ref 8–23)
BUN BLD-MCNC: 68 MG/DL (ref 8–23)
BUN/CREAT SERPL: 42.5 (ref 7–25)
BUN/CREAT SERPL: 43.9 (ref 7–25)
CALCIUM SPEC-SCNC: 10.2 MG/DL (ref 8.6–10.5)
CALCIUM SPEC-SCNC: 9.8 MG/DL (ref 8.6–10.5)
CHLORIDE SERPL-SCNC: 103 MMOL/L (ref 98–107)
CHLORIDE SERPL-SCNC: 103 MMOL/L (ref 98–107)
CHLORIDE UR-SCNC: <20 MMOL/L
CLARITY UR: ABNORMAL
CO2 SERPL-SCNC: 22.6 MMOL/L (ref 22–29)
CO2 SERPL-SCNC: 23.3 MMOL/L (ref 22–29)
COLOR UR: ABNORMAL
CREAT BLD-MCNC: 1.46 MG/DL (ref 0.57–1)
CREAT BLD-MCNC: 1.55 MG/DL (ref 0.57–1)
CREAT UR-MCNC: 185.3 MG/DL
EOSINOPHIL SPEC QL MICRO: 0 % EOS/100 CELLS (ref 0–0)
GFR SERPL CREATININE-BSD FRML MDRD: 32 ML/MIN/1.73
GFR SERPL CREATININE-BSD FRML MDRD: 34 ML/MIN/1.73
GLUCOSE BLD-MCNC: 107 MG/DL (ref 65–99)
GLUCOSE BLD-MCNC: 120 MG/DL (ref 65–99)
GLUCOSE BLDC GLUCOMTR-MCNC: 110 MG/DL (ref 70–130)
GLUCOSE BLDC GLUCOMTR-MCNC: 129 MG/DL (ref 70–130)
GLUCOSE BLDC GLUCOMTR-MCNC: 129 MG/DL (ref 70–130)
GLUCOSE BLDC GLUCOMTR-MCNC: 96 MG/DL (ref 70–130)
GLUCOSE UR STRIP-MCNC: NEGATIVE MG/DL
HGB UR QL STRIP.AUTO: NEGATIVE
HYALINE CASTS UR QL AUTO: ABNORMAL /LPF
KETONES UR QL STRIP: NEGATIVE
LEUKOCYTE ESTERASE UR QL STRIP.AUTO: ABNORMAL
NITRITE UR QL STRIP: NEGATIVE
NT-PROBNP SERPL-MCNC: 4144 PG/ML (ref 0–1800)
PH UR STRIP.AUTO: 5.5 [PH] (ref 5–8)
POTASSIUM BLD-SCNC: 3.9 MMOL/L (ref 3.5–5.2)
POTASSIUM BLD-SCNC: 4 MMOL/L (ref 3.5–5.2)
PROT UR QL STRIP: ABNORMAL
PROT UR-MCNC: 62 MG/DL
RBC # UR: ABNORMAL /HPF
REF LAB TEST METHOD: ABNORMAL
SODIUM BLD-SCNC: 140 MMOL/L (ref 136–145)
SODIUM BLD-SCNC: 141 MMOL/L (ref 136–145)
SODIUM UR-SCNC: <20 MMOL/L
SP GR UR STRIP: 1.02 (ref 1–1.03)
SQUAMOUS #/AREA URNS HPF: ABNORMAL /HPF
UROBILINOGEN UR QL STRIP: ABNORMAL
WBC UR QL AUTO: ABNORMAL /HPF

## 2018-05-14 PROCEDURE — 84300 ASSAY OF URINE SODIUM: CPT | Performed by: INTERNAL MEDICINE

## 2018-05-14 PROCEDURE — 83880 ASSAY OF NATRIURETIC PEPTIDE: CPT | Performed by: HOSPITALIST

## 2018-05-14 PROCEDURE — 80048 BASIC METABOLIC PNL TOTAL CA: CPT | Performed by: INTERNAL MEDICINE

## 2018-05-14 PROCEDURE — 82436 ASSAY OF URINE CHLORIDE: CPT | Performed by: INTERNAL MEDICINE

## 2018-05-14 PROCEDURE — 81001 URINALYSIS AUTO W/SCOPE: CPT | Performed by: INTERNAL MEDICINE

## 2018-05-14 PROCEDURE — 84156 ASSAY OF PROTEIN URINE: CPT | Performed by: INTERNAL MEDICINE

## 2018-05-14 PROCEDURE — 87205 SMEAR GRAM STAIN: CPT | Performed by: INTERNAL MEDICINE

## 2018-05-14 PROCEDURE — 82570 ASSAY OF URINE CREATININE: CPT | Performed by: INTERNAL MEDICINE

## 2018-05-14 PROCEDURE — 82962 GLUCOSE BLOOD TEST: CPT

## 2018-05-14 PROCEDURE — 80048 BASIC METABOLIC PNL TOTAL CA: CPT | Performed by: HOSPITALIST

## 2018-05-14 RX ORDER — SODIUM CHLORIDE 9 MG/ML
75 INJECTION, SOLUTION INTRAVENOUS CONTINUOUS
Status: DISCONTINUED | OUTPATIENT
Start: 2018-05-14 | End: 2018-05-16

## 2018-05-14 RX ORDER — ARIPIPRAZOLE 10 MG/1
10 TABLET ORAL DAILY
Status: DISCONTINUED | OUTPATIENT
Start: 2018-05-15 | End: 2018-05-29 | Stop reason: HOSPADM

## 2018-05-14 RX ADMIN — ALPRAZOLAM 0.25 MG: 0.5 TABLET ORAL at 08:46

## 2018-05-14 RX ADMIN — TIMOLOL MALEATE 1 DROP: 5 SOLUTION/ DROPS OPHTHALMIC at 21:58

## 2018-05-14 RX ADMIN — AMLODIPINE BESYLATE 5 MG: 2.5 TABLET ORAL at 09:16

## 2018-05-14 RX ADMIN — ALPRAZOLAM 0.25 MG: 0.5 TABLET ORAL at 21:57

## 2018-05-14 RX ADMIN — PANTOPRAZOLE SODIUM 40 MG: 40 TABLET, DELAYED RELEASE ORAL at 08:46

## 2018-05-14 RX ADMIN — SODIUM CHLORIDE 75 ML/HR: 9 INJECTION, SOLUTION INTRAVENOUS at 17:42

## 2018-05-14 RX ADMIN — LATANOPROST 1 DROP: 50 SOLUTION OPHTHALMIC at 21:58

## 2018-05-14 RX ADMIN — ASPIRIN 81 MG: 81 TABLET, CHEWABLE ORAL at 08:47

## 2018-05-14 RX ADMIN — ARIPIPRAZOLE 5 MG: 5 TABLET ORAL at 08:47

## 2018-05-14 RX ADMIN — LISINOPRIL 20 MG: 20 TABLET ORAL at 09:16

## 2018-05-14 RX ADMIN — SODIUM CHLORIDE 75 ML/HR: 4.5 INJECTION, SOLUTION INTRAVENOUS at 03:16

## 2018-05-14 RX ADMIN — LEVOTHYROXINE SODIUM 25 MCG: 25 TABLET ORAL at 08:47

## 2018-05-14 RX ADMIN — ALPRAZOLAM 0.25 MG: 0.5 TABLET ORAL at 15:05

## 2018-05-14 RX ADMIN — TIMOLOL MALEATE 1 DROP: 5 SOLUTION/ DROPS OPHTHALMIC at 08:48

## 2018-05-14 RX ADMIN — VENLAFAXINE HYDROCHLORIDE 75 MG: 75 CAPSULE, EXTENDED RELEASE ORAL at 08:47

## 2018-05-14 RX ADMIN — HYDRALAZINE HYDROCHLORIDE 100 MG: 50 TABLET, FILM COATED ORAL at 09:16

## 2018-05-14 RX ADMIN — HYDRALAZINE HYDROCHLORIDE 100 MG: 50 TABLET, FILM COATED ORAL at 15:06

## 2018-05-15 LAB
ANION GAP SERPL CALCULATED.3IONS-SCNC: 13.5 MMOL/L
BASOPHILS # BLD AUTO: 0.01 10*3/MM3 (ref 0–0.2)
BASOPHILS NFR BLD AUTO: 0.1 % (ref 0–1.5)
BUN BLD-MCNC: 61 MG/DL (ref 8–23)
BUN/CREAT SERPL: 42.1 (ref 7–25)
CALCIUM SPEC-SCNC: 9.7 MG/DL (ref 8.6–10.5)
CHLORIDE SERPL-SCNC: 106 MMOL/L (ref 98–107)
CO2 SERPL-SCNC: 22.5 MMOL/L (ref 22–29)
CREAT BLD-MCNC: 1.45 MG/DL (ref 0.57–1)
DEPRECATED RDW RBC AUTO: 51.5 FL (ref 37–54)
EOSINOPHIL # BLD AUTO: 0.2 10*3/MM3 (ref 0–0.7)
EOSINOPHIL NFR BLD AUTO: 1.7 % (ref 0.3–6.2)
ERYTHROCYTE [DISTWIDTH] IN BLOOD BY AUTOMATED COUNT: 15.3 % (ref 11.7–13)
GFR SERPL CREATININE-BSD FRML MDRD: 34 ML/MIN/1.73
GLUCOSE BLD-MCNC: 112 MG/DL (ref 65–99)
GLUCOSE BLDC GLUCOMTR-MCNC: 114 MG/DL (ref 70–130)
GLUCOSE BLDC GLUCOMTR-MCNC: 120 MG/DL (ref 70–130)
GLUCOSE BLDC GLUCOMTR-MCNC: 133 MG/DL (ref 70–130)
GLUCOSE BLDC GLUCOMTR-MCNC: 199 MG/DL (ref 70–130)
HCT VFR BLD AUTO: 37.8 % (ref 35.6–45.5)
HGB BLD-MCNC: 11.7 G/DL (ref 11.9–15.5)
IMM GRANULOCYTES # BLD: 0.06 10*3/MM3 (ref 0–0.03)
IMM GRANULOCYTES NFR BLD: 0.5 % (ref 0–0.5)
LYMPHOCYTES # BLD AUTO: 1.44 10*3/MM3 (ref 0.9–4.8)
LYMPHOCYTES NFR BLD AUTO: 12.4 % (ref 19.6–45.3)
MCH RBC QN AUTO: 28.5 PG (ref 26.9–32)
MCHC RBC AUTO-ENTMCNC: 31 G/DL (ref 32.4–36.3)
MCV RBC AUTO: 92.2 FL (ref 80.5–98.2)
MONOCYTES # BLD AUTO: 0.98 10*3/MM3 (ref 0.2–1.2)
MONOCYTES NFR BLD AUTO: 8.4 % (ref 5–12)
NEUTROPHILS # BLD AUTO: 8.98 10*3/MM3 (ref 1.9–8.1)
NEUTROPHILS NFR BLD AUTO: 77.4 % (ref 42.7–76)
NRBC BLD MANUAL-RTO: 0 /100 WBC (ref 0–0)
PLATELET # BLD AUTO: 219 10*3/MM3 (ref 140–500)
PMV BLD AUTO: 11.2 FL (ref 6–12)
POTASSIUM BLD-SCNC: 3.9 MMOL/L (ref 3.5–5.2)
RBC # BLD AUTO: 4.1 10*6/MM3 (ref 3.9–5.2)
SODIUM BLD-SCNC: 142 MMOL/L (ref 136–145)
WBC NRBC COR # BLD: 11.61 10*3/MM3 (ref 4.5–10.7)

## 2018-05-15 PROCEDURE — 97110 THERAPEUTIC EXERCISES: CPT

## 2018-05-15 PROCEDURE — 85025 COMPLETE CBC W/AUTO DIFF WBC: CPT | Performed by: HOSPITALIST

## 2018-05-15 PROCEDURE — 80048 BASIC METABOLIC PNL TOTAL CA: CPT | Performed by: INTERNAL MEDICINE

## 2018-05-15 PROCEDURE — 25010000002 CEFTRIAXONE PER 250 MG: Performed by: HOSPITALIST

## 2018-05-15 PROCEDURE — 82962 GLUCOSE BLOOD TEST: CPT

## 2018-05-15 PROCEDURE — 87186 SC STD MICRODIL/AGAR DIL: CPT | Performed by: INTERNAL MEDICINE

## 2018-05-15 PROCEDURE — 87086 URINE CULTURE/COLONY COUNT: CPT | Performed by: INTERNAL MEDICINE

## 2018-05-15 RX ORDER — CEFTRIAXONE SODIUM 1 G/50ML
1 INJECTION, SOLUTION INTRAVENOUS EVERY 24 HOURS
Status: DISCONTINUED | OUTPATIENT
Start: 2018-05-15 | End: 2018-05-17

## 2018-05-15 RX ORDER — HYDRALAZINE HYDROCHLORIDE 10 MG/1
10 TABLET, FILM COATED ORAL EVERY 8 HOURS SCHEDULED
Status: DISCONTINUED | OUTPATIENT
Start: 2018-05-15 | End: 2018-05-17

## 2018-05-15 RX ADMIN — TIMOLOL MALEATE 1 DROP: 5 SOLUTION/ DROPS OPHTHALMIC at 09:27

## 2018-05-15 RX ADMIN — ALPRAZOLAM 0.25 MG: 0.5 TABLET ORAL at 09:27

## 2018-05-15 RX ADMIN — LEVOTHYROXINE SODIUM 25 MCG: 25 TABLET ORAL at 09:26

## 2018-05-15 RX ADMIN — CEFTRIAXONE SODIUM 1 G: 1 INJECTION, SOLUTION INTRAVENOUS at 15:51

## 2018-05-15 RX ADMIN — ARIPIPRAZOLE 10 MG: 10 TABLET ORAL at 09:28

## 2018-05-15 RX ADMIN — ALPRAZOLAM 0.25 MG: 0.5 TABLET ORAL at 20:57

## 2018-05-15 RX ADMIN — ASPIRIN 81 MG: 81 TABLET, CHEWABLE ORAL at 09:26

## 2018-05-15 RX ADMIN — TIMOLOL MALEATE 1 DROP: 5 SOLUTION/ DROPS OPHTHALMIC at 20:56

## 2018-05-15 RX ADMIN — PANTOPRAZOLE SODIUM 40 MG: 40 TABLET, DELAYED RELEASE ORAL at 09:28

## 2018-05-15 RX ADMIN — HYDRALAZINE HYDROCHLORIDE 10 MG: 10 TABLET, FILM COATED ORAL at 16:01

## 2018-05-15 RX ADMIN — VENLAFAXINE HYDROCHLORIDE 75 MG: 75 CAPSULE, EXTENDED RELEASE ORAL at 09:26

## 2018-05-15 RX ADMIN — ALPRAZOLAM 0.25 MG: 0.5 TABLET ORAL at 15:56

## 2018-05-15 RX ADMIN — HYDRALAZINE HYDROCHLORIDE 10 MG: 10 TABLET, FILM COATED ORAL at 21:00

## 2018-05-15 RX ADMIN — LATANOPROST 1 DROP: 50 SOLUTION OPHTHALMIC at 20:56

## 2018-05-16 PROBLEM — N39.0 UTI (URINARY TRACT INFECTION): Status: ACTIVE | Noted: 2018-05-16

## 2018-05-16 LAB
ANION GAP SERPL CALCULATED.3IONS-SCNC: 11.7 MMOL/L
BASOPHILS # BLD AUTO: 0.01 10*3/MM3 (ref 0–0.2)
BASOPHILS NFR BLD AUTO: 0.1 % (ref 0–1.5)
BUN BLD-MCNC: 45 MG/DL (ref 8–23)
BUN/CREAT SERPL: 47.4 (ref 7–25)
CALCIUM SPEC-SCNC: 8.9 MG/DL (ref 8.6–10.5)
CHLORIDE SERPL-SCNC: 109 MMOL/L (ref 98–107)
CO2 SERPL-SCNC: 21.3 MMOL/L (ref 22–29)
CREAT BLD-MCNC: 0.95 MG/DL (ref 0.57–1)
DEPRECATED RDW RBC AUTO: 50.5 FL (ref 37–54)
EOSINOPHIL # BLD AUTO: 0.29 10*3/MM3 (ref 0–0.7)
EOSINOPHIL NFR BLD AUTO: 3.2 % (ref 0.3–6.2)
ERYTHROCYTE [DISTWIDTH] IN BLOOD BY AUTOMATED COUNT: 15.2 % (ref 11.7–13)
GFR SERPL CREATININE-BSD FRML MDRD: 56 ML/MIN/1.73
GLUCOSE BLD-MCNC: 103 MG/DL (ref 65–99)
GLUCOSE BLDC GLUCOMTR-MCNC: 111 MG/DL (ref 70–130)
GLUCOSE BLDC GLUCOMTR-MCNC: 114 MG/DL (ref 70–130)
GLUCOSE BLDC GLUCOMTR-MCNC: 96 MG/DL (ref 70–130)
GLUCOSE BLDC GLUCOMTR-MCNC: 98 MG/DL (ref 70–130)
HCT VFR BLD AUTO: 36 % (ref 35.6–45.5)
HGB BLD-MCNC: 11 G/DL (ref 11.9–15.5)
IMM GRANULOCYTES # BLD: 0.05 10*3/MM3 (ref 0–0.03)
IMM GRANULOCYTES NFR BLD: 0.5 % (ref 0–0.5)
LYMPHOCYTES # BLD AUTO: 1.59 10*3/MM3 (ref 0.9–4.8)
LYMPHOCYTES NFR BLD AUTO: 17.4 % (ref 19.6–45.3)
MCH RBC QN AUTO: 28.3 PG (ref 26.9–32)
MCHC RBC AUTO-ENTMCNC: 30.6 G/DL (ref 32.4–36.3)
MCV RBC AUTO: 92.5 FL (ref 80.5–98.2)
MONOCYTES # BLD AUTO: 0.75 10*3/MM3 (ref 0.2–1.2)
MONOCYTES NFR BLD AUTO: 8.2 % (ref 5–12)
NEUTROPHILS # BLD AUTO: 6.46 10*3/MM3 (ref 1.9–8.1)
NEUTROPHILS NFR BLD AUTO: 70.6 % (ref 42.7–76)
PLATELET # BLD AUTO: 199 10*3/MM3 (ref 140–500)
PMV BLD AUTO: 11.7 FL (ref 6–12)
POTASSIUM BLD-SCNC: 3.9 MMOL/L (ref 3.5–5.2)
RBC # BLD AUTO: 3.89 10*6/MM3 (ref 3.9–5.2)
SODIUM BLD-SCNC: 142 MMOL/L (ref 136–145)
WBC NRBC COR # BLD: 9.15 10*3/MM3 (ref 4.5–10.7)

## 2018-05-16 PROCEDURE — 97535 SELF CARE MNGMENT TRAINING: CPT | Performed by: OCCUPATIONAL THERAPIST

## 2018-05-16 PROCEDURE — 85025 COMPLETE CBC W/AUTO DIFF WBC: CPT | Performed by: HOSPITALIST

## 2018-05-16 PROCEDURE — 82962 GLUCOSE BLOOD TEST: CPT

## 2018-05-16 PROCEDURE — 97110 THERAPEUTIC EXERCISES: CPT

## 2018-05-16 PROCEDURE — 25010000002 CEFTRIAXONE PER 250 MG: Performed by: HOSPITALIST

## 2018-05-16 PROCEDURE — 80048 BASIC METABOLIC PNL TOTAL CA: CPT | Performed by: HOSPITALIST

## 2018-05-16 RX ORDER — VENLAFAXINE HYDROCHLORIDE 150 MG/1
150 CAPSULE, EXTENDED RELEASE ORAL
Status: DISCONTINUED | OUTPATIENT
Start: 2018-05-17 | End: 2018-05-29 | Stop reason: HOSPADM

## 2018-05-16 RX ADMIN — ALPRAZOLAM 0.25 MG: 0.5 TABLET ORAL at 09:22

## 2018-05-16 RX ADMIN — HYDRALAZINE HYDROCHLORIDE 10 MG: 10 TABLET, FILM COATED ORAL at 21:22

## 2018-05-16 RX ADMIN — ALPRAZOLAM 0.25 MG: 0.5 TABLET ORAL at 15:41

## 2018-05-16 RX ADMIN — HYDRALAZINE HYDROCHLORIDE 10 MG: 10 TABLET, FILM COATED ORAL at 15:41

## 2018-05-16 RX ADMIN — SODIUM CHLORIDE 75 ML/HR: 9 INJECTION, SOLUTION INTRAVENOUS at 00:59

## 2018-05-16 RX ADMIN — TIMOLOL MALEATE 1 DROP: 5 SOLUTION/ DROPS OPHTHALMIC at 21:21

## 2018-05-16 RX ADMIN — TIMOLOL MALEATE 1 DROP: 5 SOLUTION/ DROPS OPHTHALMIC at 09:31

## 2018-05-16 RX ADMIN — ARIPIPRAZOLE 10 MG: 10 TABLET ORAL at 09:22

## 2018-05-16 RX ADMIN — LEVOTHYROXINE SODIUM 25 MCG: 25 TABLET ORAL at 09:22

## 2018-05-16 RX ADMIN — LATANOPROST 1 DROP: 50 SOLUTION OPHTHALMIC at 21:20

## 2018-05-16 RX ADMIN — HYDRALAZINE HYDROCHLORIDE 10 MG: 10 TABLET, FILM COATED ORAL at 05:51

## 2018-05-16 RX ADMIN — ALPRAZOLAM 0.25 MG: 0.5 TABLET ORAL at 21:22

## 2018-05-16 RX ADMIN — ASPIRIN 81 MG: 81 TABLET, CHEWABLE ORAL at 09:22

## 2018-05-16 RX ADMIN — CEFTRIAXONE SODIUM 1 G: 1 INJECTION, SOLUTION INTRAVENOUS at 14:54

## 2018-05-17 LAB
ANION GAP SERPL CALCULATED.3IONS-SCNC: 12.7 MMOL/L
BASOPHILS # BLD AUTO: 0 10*3/MM3 (ref 0–0.2)
BASOPHILS NFR BLD AUTO: 0 % (ref 0–1.5)
BUN BLD-MCNC: 25 MG/DL (ref 8–23)
BUN/CREAT SERPL: 31.3 (ref 7–25)
CALCIUM SPEC-SCNC: 8.8 MG/DL (ref 8.6–10.5)
CHLORIDE SERPL-SCNC: 106 MMOL/L (ref 98–107)
CO2 SERPL-SCNC: 21.3 MMOL/L (ref 22–29)
CREAT BLD-MCNC: 0.8 MG/DL (ref 0.57–1)
DEPRECATED RDW RBC AUTO: 49.7 FL (ref 37–54)
EOSINOPHIL # BLD AUTO: 0.25 10*3/MM3 (ref 0–0.7)
EOSINOPHIL NFR BLD AUTO: 3 % (ref 0.3–6.2)
ERYTHROCYTE [DISTWIDTH] IN BLOOD BY AUTOMATED COUNT: 15 % (ref 11.7–13)
GFR SERPL CREATININE-BSD FRML MDRD: 68 ML/MIN/1.73
GLUCOSE BLD-MCNC: 133 MG/DL (ref 65–99)
GLUCOSE BLDC GLUCOMTR-MCNC: 114 MG/DL (ref 70–130)
GLUCOSE BLDC GLUCOMTR-MCNC: 116 MG/DL (ref 70–130)
GLUCOSE BLDC GLUCOMTR-MCNC: 123 MG/DL (ref 70–130)
GLUCOSE BLDC GLUCOMTR-MCNC: 91 MG/DL (ref 70–130)
HCT VFR BLD AUTO: 36.6 % (ref 35.6–45.5)
HGB BLD-MCNC: 11.4 G/DL (ref 11.9–15.5)
IMM GRANULOCYTES # BLD: 0.09 10*3/MM3 (ref 0–0.03)
IMM GRANULOCYTES NFR BLD: 1.1 % (ref 0–0.5)
LYMPHOCYTES # BLD AUTO: 1.3 10*3/MM3 (ref 0.9–4.8)
LYMPHOCYTES NFR BLD AUTO: 15.7 % (ref 19.6–45.3)
MCH RBC QN AUTO: 28.6 PG (ref 26.9–32)
MCHC RBC AUTO-ENTMCNC: 31.1 G/DL (ref 32.4–36.3)
MCV RBC AUTO: 91.7 FL (ref 80.5–98.2)
MONOCYTES # BLD AUTO: 0.68 10*3/MM3 (ref 0.2–1.2)
MONOCYTES NFR BLD AUTO: 8.2 % (ref 5–12)
NEUTROPHILS # BLD AUTO: 5.94 10*3/MM3 (ref 1.9–8.1)
NEUTROPHILS NFR BLD AUTO: 72 % (ref 42.7–76)
PLATELET # BLD AUTO: 207 10*3/MM3 (ref 140–500)
PMV BLD AUTO: 11.3 FL (ref 6–12)
POTASSIUM BLD-SCNC: 3.5 MMOL/L (ref 3.5–5.2)
RBC # BLD AUTO: 3.99 10*6/MM3 (ref 3.9–5.2)
SODIUM BLD-SCNC: 140 MMOL/L (ref 136–145)
WBC NRBC COR # BLD: 8.26 10*3/MM3 (ref 4.5–10.7)

## 2018-05-17 PROCEDURE — 80048 BASIC METABOLIC PNL TOTAL CA: CPT | Performed by: HOSPITALIST

## 2018-05-17 PROCEDURE — 82962 GLUCOSE BLOOD TEST: CPT

## 2018-05-17 PROCEDURE — 85025 COMPLETE CBC W/AUTO DIFF WBC: CPT | Performed by: HOSPITALIST

## 2018-05-17 PROCEDURE — 84443 ASSAY THYROID STIM HORMONE: CPT | Performed by: HOSPITALIST

## 2018-05-17 PROCEDURE — 97110 THERAPEUTIC EXERCISES: CPT

## 2018-05-17 RX ORDER — LEVOFLOXACIN 250 MG/1
250 TABLET ORAL EVERY 24 HOURS
Status: DISCONTINUED | OUTPATIENT
Start: 2018-05-18 | End: 2018-05-21

## 2018-05-17 RX ORDER — HYDRALAZINE HYDROCHLORIDE 25 MG/1
25 TABLET, FILM COATED ORAL EVERY 8 HOURS SCHEDULED
Status: DISCONTINUED | OUTPATIENT
Start: 2018-05-17 | End: 2018-05-18

## 2018-05-17 RX ORDER — LEVOFLOXACIN 500 MG/1
500 TABLET, FILM COATED ORAL EVERY 24 HOURS
Status: COMPLETED | OUTPATIENT
Start: 2018-05-17 | End: 2018-05-17

## 2018-05-17 RX ORDER — LEVOFLOXACIN 500 MG/1
500 TABLET, FILM COATED ORAL EVERY 24 HOURS
Status: DISCONTINUED | OUTPATIENT
Start: 2018-05-17 | End: 2018-05-17 | Stop reason: DRUGHIGH

## 2018-05-17 RX ADMIN — ALPRAZOLAM 0.25 MG: 0.5 TABLET ORAL at 21:08

## 2018-05-17 RX ADMIN — ARIPIPRAZOLE 10 MG: 10 TABLET ORAL at 08:30

## 2018-05-17 RX ADMIN — ALPRAZOLAM 0.25 MG: 0.5 TABLET ORAL at 15:05

## 2018-05-17 RX ADMIN — LEVOTHYROXINE SODIUM 25 MCG: 25 TABLET ORAL at 08:30

## 2018-05-17 RX ADMIN — TIMOLOL MALEATE 1 DROP: 5 SOLUTION/ DROPS OPHTHALMIC at 21:13

## 2018-05-17 RX ADMIN — LEVOFLOXACIN 500 MG: 500 TABLET, FILM COATED ORAL at 15:06

## 2018-05-17 RX ADMIN — TIMOLOL MALEATE 1 DROP: 5 SOLUTION/ DROPS OPHTHALMIC at 08:30

## 2018-05-17 RX ADMIN — HYDRALAZINE HYDROCHLORIDE 10 MG: 10 TABLET, FILM COATED ORAL at 05:24

## 2018-05-17 RX ADMIN — HYDRALAZINE HYDROCHLORIDE 10 MG: 10 TABLET, FILM COATED ORAL at 15:05

## 2018-05-17 RX ADMIN — LATANOPROST 1 DROP: 50 SOLUTION OPHTHALMIC at 21:13

## 2018-05-17 RX ADMIN — HYDRALAZINE HYDROCHLORIDE 25 MG: 25 TABLET, FILM COATED ORAL at 21:08

## 2018-05-17 RX ADMIN — PANTOPRAZOLE SODIUM 40 MG: 40 TABLET, DELAYED RELEASE ORAL at 08:30

## 2018-05-17 RX ADMIN — VENLAFAXINE HYDROCHLORIDE 150 MG: 150 CAPSULE, EXTENDED RELEASE ORAL at 08:29

## 2018-05-17 RX ADMIN — ASPIRIN 81 MG: 81 TABLET, CHEWABLE ORAL at 08:29

## 2018-05-17 RX ADMIN — ALPRAZOLAM 0.25 MG: 0.5 TABLET ORAL at 08:30

## 2018-05-18 LAB
BACTERIA SPEC AEROBE CULT: ABNORMAL
BACTERIA SPEC AEROBE CULT: ABNORMAL
GLUCOSE BLDC GLUCOMTR-MCNC: 105 MG/DL (ref 70–130)
GLUCOSE BLDC GLUCOMTR-MCNC: 124 MG/DL (ref 70–130)
GLUCOSE BLDC GLUCOMTR-MCNC: 135 MG/DL (ref 70–130)
GLUCOSE BLDC GLUCOMTR-MCNC: 161 MG/DL (ref 70–130)
TSH SERPL DL<=0.05 MIU/L-ACNC: 2.47 MIU/ML (ref 0.27–4.2)

## 2018-05-18 PROCEDURE — 82962 GLUCOSE BLOOD TEST: CPT

## 2018-05-18 PROCEDURE — 97110 THERAPEUTIC EXERCISES: CPT

## 2018-05-18 PROCEDURE — 97535 SELF CARE MNGMENT TRAINING: CPT | Performed by: OCCUPATIONAL THERAPIST

## 2018-05-18 PROCEDURE — 63710000001 INSULIN ASPART PER 5 UNITS: Performed by: HOSPITALIST

## 2018-05-18 RX ORDER — NIFEDIPINE 30 MG/1
30 TABLET, EXTENDED RELEASE ORAL
Status: DISCONTINUED | OUTPATIENT
Start: 2018-05-18 | End: 2018-05-19

## 2018-05-18 RX ORDER — HYDRALAZINE HYDROCHLORIDE 50 MG/1
50 TABLET, FILM COATED ORAL EVERY 8 HOURS SCHEDULED
Status: DISCONTINUED | OUTPATIENT
Start: 2018-05-18 | End: 2018-05-19

## 2018-05-18 RX ADMIN — TIMOLOL MALEATE 1 DROP: 5 SOLUTION/ DROPS OPHTHALMIC at 22:00

## 2018-05-18 RX ADMIN — LEVOFLOXACIN 250 MG: 250 TABLET, FILM COATED ORAL at 13:52

## 2018-05-18 RX ADMIN — TIMOLOL MALEATE 1 DROP: 5 SOLUTION/ DROPS OPHTHALMIC at 09:16

## 2018-05-18 RX ADMIN — ALPRAZOLAM 0.25 MG: 0.5 TABLET ORAL at 16:45

## 2018-05-18 RX ADMIN — PANTOPRAZOLE SODIUM 40 MG: 40 TABLET, DELAYED RELEASE ORAL at 09:17

## 2018-05-18 RX ADMIN — LATANOPROST 1 DROP: 50 SOLUTION OPHTHALMIC at 22:00

## 2018-05-18 RX ADMIN — VENLAFAXINE HYDROCHLORIDE 150 MG: 150 CAPSULE, EXTENDED RELEASE ORAL at 09:17

## 2018-05-18 RX ADMIN — HYDRALAZINE HYDROCHLORIDE 50 MG: 50 TABLET, FILM COATED ORAL at 22:01

## 2018-05-18 RX ADMIN — ALPRAZOLAM 0.25 MG: 0.5 TABLET ORAL at 09:17

## 2018-05-18 RX ADMIN — ARIPIPRAZOLE 10 MG: 10 TABLET ORAL at 09:17

## 2018-05-18 RX ADMIN — ALPRAZOLAM 0.25 MG: 0.5 TABLET ORAL at 22:02

## 2018-05-18 RX ADMIN — LEVOTHYROXINE SODIUM 25 MCG: 25 TABLET ORAL at 07:30

## 2018-05-18 RX ADMIN — ASPIRIN 81 MG: 81 TABLET, CHEWABLE ORAL at 09:17

## 2018-05-18 RX ADMIN — INSULIN ASPART 2 UNITS: 100 INJECTION, SOLUTION INTRAVENOUS; SUBCUTANEOUS at 22:02

## 2018-05-18 RX ADMIN — HYDRALAZINE HYDROCHLORIDE 50 MG: 50 TABLET, FILM COATED ORAL at 13:52

## 2018-05-18 RX ADMIN — NIFEDIPINE 30 MG: 30 TABLET, FILM COATED, EXTENDED RELEASE ORAL at 10:57

## 2018-05-19 LAB
ANION GAP SERPL CALCULATED.3IONS-SCNC: 13 MMOL/L
BUN BLD-MCNC: 16 MG/DL (ref 8–23)
BUN/CREAT SERPL: 19.8 (ref 7–25)
CALCIUM SPEC-SCNC: 9 MG/DL (ref 8.6–10.5)
CHLORIDE SERPL-SCNC: 107 MMOL/L (ref 98–107)
CO2 SERPL-SCNC: 21 MMOL/L (ref 22–29)
CREAT BLD-MCNC: 0.81 MG/DL (ref 0.57–1)
GFR SERPL CREATININE-BSD FRML MDRD: 67 ML/MIN/1.73
GLUCOSE BLD-MCNC: 112 MG/DL (ref 65–99)
GLUCOSE BLDC GLUCOMTR-MCNC: 100 MG/DL (ref 70–130)
GLUCOSE BLDC GLUCOMTR-MCNC: 100 MG/DL (ref 70–130)
GLUCOSE BLDC GLUCOMTR-MCNC: 144 MG/DL (ref 70–130)
GLUCOSE BLDC GLUCOMTR-MCNC: 96 MG/DL (ref 70–130)
POTASSIUM BLD-SCNC: 3.6 MMOL/L (ref 3.5–5.2)
SODIUM BLD-SCNC: 141 MMOL/L (ref 136–145)

## 2018-05-19 PROCEDURE — 80048 BASIC METABOLIC PNL TOTAL CA: CPT | Performed by: INTERNAL MEDICINE

## 2018-05-19 PROCEDURE — 82962 GLUCOSE BLOOD TEST: CPT

## 2018-05-19 RX ORDER — HYDRALAZINE HYDROCHLORIDE 50 MG/1
100 TABLET, FILM COATED ORAL EVERY 8 HOURS SCHEDULED
Status: DISCONTINUED | OUTPATIENT
Start: 2018-05-19 | End: 2018-05-29 | Stop reason: HOSPADM

## 2018-05-19 RX ORDER — NIFEDIPINE 60 MG/1
60 TABLET, EXTENDED RELEASE ORAL
Status: DISCONTINUED | OUTPATIENT
Start: 2018-05-19 | End: 2018-05-29 | Stop reason: HOSPADM

## 2018-05-19 RX ADMIN — TIMOLOL MALEATE 1 DROP: 5 SOLUTION/ DROPS OPHTHALMIC at 21:04

## 2018-05-19 RX ADMIN — ARIPIPRAZOLE 10 MG: 10 TABLET ORAL at 09:02

## 2018-05-19 RX ADMIN — LATANOPROST 1 DROP: 50 SOLUTION OPHTHALMIC at 21:04

## 2018-05-19 RX ADMIN — HYDRALAZINE HYDROCHLORIDE 100 MG: 50 TABLET, FILM COATED ORAL at 21:03

## 2018-05-19 RX ADMIN — ALPRAZOLAM 0.25 MG: 0.5 TABLET ORAL at 21:03

## 2018-05-19 RX ADMIN — LEVOTHYROXINE SODIUM 25 MCG: 25 TABLET ORAL at 09:02

## 2018-05-19 RX ADMIN — ASPIRIN 81 MG: 81 TABLET, CHEWABLE ORAL at 09:02

## 2018-05-19 RX ADMIN — HYDRALAZINE HYDROCHLORIDE 50 MG: 50 TABLET, FILM COATED ORAL at 08:59

## 2018-05-19 RX ADMIN — HYDRALAZINE HYDROCHLORIDE 100 MG: 50 TABLET, FILM COATED ORAL at 15:11

## 2018-05-19 RX ADMIN — TIMOLOL MALEATE 1 DROP: 5 SOLUTION/ DROPS OPHTHALMIC at 09:22

## 2018-05-19 RX ADMIN — LEVOFLOXACIN 250 MG: 250 TABLET, FILM COATED ORAL at 13:52

## 2018-05-19 RX ADMIN — ALPRAZOLAM 0.25 MG: 0.5 TABLET ORAL at 09:02

## 2018-05-20 LAB
ANION GAP SERPL CALCULATED.3IONS-SCNC: 13.4 MMOL/L
BUN BLD-MCNC: 22 MG/DL (ref 8–23)
BUN/CREAT SERPL: 21.4 (ref 7–25)
CALCIUM SPEC-SCNC: 9.6 MG/DL (ref 8.6–10.5)
CHLORIDE SERPL-SCNC: 106 MMOL/L (ref 98–107)
CO2 SERPL-SCNC: 22.6 MMOL/L (ref 22–29)
CREAT BLD-MCNC: 1.03 MG/DL (ref 0.57–1)
GFR SERPL CREATININE-BSD FRML MDRD: 51 ML/MIN/1.73
GLUCOSE BLD-MCNC: 131 MG/DL (ref 65–99)
GLUCOSE BLDC GLUCOMTR-MCNC: 108 MG/DL (ref 70–130)
GLUCOSE BLDC GLUCOMTR-MCNC: 176 MG/DL (ref 70–130)
GLUCOSE BLDC GLUCOMTR-MCNC: 95 MG/DL (ref 70–130)
POTASSIUM BLD-SCNC: 4.2 MMOL/L (ref 3.5–5.2)
SODIUM BLD-SCNC: 142 MMOL/L (ref 136–145)

## 2018-05-20 PROCEDURE — 80048 BASIC METABOLIC PNL TOTAL CA: CPT | Performed by: HOSPITALIST

## 2018-05-20 PROCEDURE — 82962 GLUCOSE BLOOD TEST: CPT

## 2018-05-20 PROCEDURE — 63710000001 INSULIN ASPART PER 5 UNITS: Performed by: HOSPITALIST

## 2018-05-20 RX ADMIN — HYDRALAZINE HYDROCHLORIDE 100 MG: 50 TABLET, FILM COATED ORAL at 21:16

## 2018-05-20 RX ADMIN — HYDRALAZINE HYDROCHLORIDE 100 MG: 50 TABLET, FILM COATED ORAL at 05:04

## 2018-05-20 RX ADMIN — ALPRAZOLAM 0.25 MG: 0.5 TABLET ORAL at 08:40

## 2018-05-20 RX ADMIN — LATANOPROST 1 DROP: 50 SOLUTION OPHTHALMIC at 21:16

## 2018-05-20 RX ADMIN — LEVOFLOXACIN 250 MG: 250 TABLET, FILM COATED ORAL at 13:54

## 2018-05-20 RX ADMIN — ALPRAZOLAM 0.25 MG: 0.5 TABLET ORAL at 17:46

## 2018-05-20 RX ADMIN — LEVOTHYROXINE SODIUM 25 MCG: 25 TABLET ORAL at 08:40

## 2018-05-20 RX ADMIN — ASPIRIN 81 MG: 81 TABLET, CHEWABLE ORAL at 08:40

## 2018-05-20 RX ADMIN — TIMOLOL MALEATE 1 DROP: 5 SOLUTION/ DROPS OPHTHALMIC at 09:01

## 2018-05-20 RX ADMIN — ARIPIPRAZOLE 10 MG: 10 TABLET ORAL at 08:40

## 2018-05-20 RX ADMIN — HYDRALAZINE HYDROCHLORIDE 100 MG: 50 TABLET, FILM COATED ORAL at 13:55

## 2018-05-20 RX ADMIN — INSULIN ASPART 2 UNITS: 100 INJECTION, SOLUTION INTRAVENOUS; SUBCUTANEOUS at 21:25

## 2018-05-20 RX ADMIN — VENLAFAXINE HYDROCHLORIDE 150 MG: 150 CAPSULE, EXTENDED RELEASE ORAL at 08:40

## 2018-05-20 RX ADMIN — TIMOLOL MALEATE 1 DROP: 5 SOLUTION/ DROPS OPHTHALMIC at 21:16

## 2018-05-20 RX ADMIN — ALPRAZOLAM 0.25 MG: 0.5 TABLET ORAL at 21:16

## 2018-05-20 RX ADMIN — NIFEDIPINE 60 MG: 60 TABLET, FILM COATED, EXTENDED RELEASE ORAL at 08:40

## 2018-05-21 LAB
GLUCOSE BLDC GLUCOMTR-MCNC: 121 MG/DL (ref 70–130)
GLUCOSE BLDC GLUCOMTR-MCNC: 129 MG/DL (ref 70–130)
GLUCOSE BLDC GLUCOMTR-MCNC: 147 MG/DL (ref 70–130)
GLUCOSE BLDC GLUCOMTR-MCNC: 153 MG/DL (ref 70–130)

## 2018-05-21 PROCEDURE — 97110 THERAPEUTIC EXERCISES: CPT

## 2018-05-21 PROCEDURE — 82962 GLUCOSE BLOOD TEST: CPT

## 2018-05-21 RX ADMIN — ALPRAZOLAM 0.25 MG: 0.5 TABLET ORAL at 15:48

## 2018-05-21 RX ADMIN — HYDRALAZINE HYDROCHLORIDE 100 MG: 50 TABLET, FILM COATED ORAL at 15:48

## 2018-05-21 RX ADMIN — PANTOPRAZOLE SODIUM 40 MG: 40 TABLET, DELAYED RELEASE ORAL at 09:14

## 2018-05-21 RX ADMIN — ALPRAZOLAM 0.25 MG: 0.5 TABLET ORAL at 09:14

## 2018-05-21 RX ADMIN — ARIPIPRAZOLE 10 MG: 10 TABLET ORAL at 09:14

## 2018-05-21 RX ADMIN — ASPIRIN 81 MG: 81 TABLET, CHEWABLE ORAL at 09:14

## 2018-05-21 RX ADMIN — NIFEDIPINE 60 MG: 60 TABLET, FILM COATED, EXTENDED RELEASE ORAL at 09:14

## 2018-05-21 RX ADMIN — HYDRALAZINE HYDROCHLORIDE 100 MG: 50 TABLET, FILM COATED ORAL at 21:10

## 2018-05-21 RX ADMIN — LEVOTHYROXINE SODIUM 25 MCG: 25 TABLET ORAL at 09:13

## 2018-05-21 RX ADMIN — LATANOPROST 1 DROP: 50 SOLUTION OPHTHALMIC at 20:49

## 2018-05-21 RX ADMIN — HYDRALAZINE HYDROCHLORIDE 100 MG: 50 TABLET, FILM COATED ORAL at 05:43

## 2018-05-21 RX ADMIN — VENLAFAXINE HYDROCHLORIDE 150 MG: 150 CAPSULE, EXTENDED RELEASE ORAL at 09:14

## 2018-05-21 RX ADMIN — TIMOLOL MALEATE 1 DROP: 5 SOLUTION/ DROPS OPHTHALMIC at 20:49

## 2018-05-21 RX ADMIN — ALPRAZOLAM 0.25 MG: 0.5 TABLET ORAL at 20:49

## 2018-05-21 RX ADMIN — TIMOLOL MALEATE 1 DROP: 5 SOLUTION/ DROPS OPHTHALMIC at 09:13

## 2018-05-22 LAB
ANION GAP SERPL CALCULATED.3IONS-SCNC: 12.8 MMOL/L
BUN BLD-MCNC: 24 MG/DL (ref 8–23)
BUN/CREAT SERPL: 24 (ref 7–25)
CALCIUM SPEC-SCNC: 9.6 MG/DL (ref 8.6–10.5)
CHLORIDE SERPL-SCNC: 107 MMOL/L (ref 98–107)
CO2 SERPL-SCNC: 22.2 MMOL/L (ref 22–29)
CREAT BLD-MCNC: 1 MG/DL (ref 0.57–1)
GFR SERPL CREATININE-BSD FRML MDRD: 53 ML/MIN/1.73
GLUCOSE BLD-MCNC: 133 MG/DL (ref 65–99)
GLUCOSE BLDC GLUCOMTR-MCNC: 115 MG/DL (ref 70–130)
GLUCOSE BLDC GLUCOMTR-MCNC: 123 MG/DL (ref 70–130)
GLUCOSE BLDC GLUCOMTR-MCNC: 146 MG/DL (ref 70–130)
GLUCOSE BLDC GLUCOMTR-MCNC: 193 MG/DL (ref 70–130)
POTASSIUM BLD-SCNC: 4.7 MMOL/L (ref 3.5–5.2)
SODIUM BLD-SCNC: 142 MMOL/L (ref 136–145)

## 2018-05-22 PROCEDURE — 63710000001 INSULIN ASPART PER 5 UNITS: Performed by: HOSPITALIST

## 2018-05-22 PROCEDURE — 82962 GLUCOSE BLOOD TEST: CPT

## 2018-05-22 PROCEDURE — 97110 THERAPEUTIC EXERCISES: CPT

## 2018-05-22 PROCEDURE — 97535 SELF CARE MNGMENT TRAINING: CPT | Performed by: OCCUPATIONAL THERAPIST

## 2018-05-22 PROCEDURE — 80048 BASIC METABOLIC PNL TOTAL CA: CPT | Performed by: INTERNAL MEDICINE

## 2018-05-22 RX ADMIN — VENLAFAXINE HYDROCHLORIDE 150 MG: 150 CAPSULE, EXTENDED RELEASE ORAL at 09:15

## 2018-05-22 RX ADMIN — LATANOPROST 1 DROP: 50 SOLUTION OPHTHALMIC at 20:31

## 2018-05-22 RX ADMIN — ALPRAZOLAM 0.25 MG: 0.5 TABLET ORAL at 15:12

## 2018-05-22 RX ADMIN — ARIPIPRAZOLE 10 MG: 10 TABLET ORAL at 09:01

## 2018-05-22 RX ADMIN — TIMOLOL MALEATE 1 DROP: 5 SOLUTION/ DROPS OPHTHALMIC at 09:01

## 2018-05-22 RX ADMIN — NIFEDIPINE 60 MG: 60 TABLET, FILM COATED, EXTENDED RELEASE ORAL at 09:15

## 2018-05-22 RX ADMIN — INSULIN ASPART 2 UNITS: 100 INJECTION, SOLUTION INTRAVENOUS; SUBCUTANEOUS at 12:27

## 2018-05-22 RX ADMIN — ALPRAZOLAM 0.25 MG: 0.5 TABLET ORAL at 20:31

## 2018-05-22 RX ADMIN — ASPIRIN 81 MG: 81 TABLET, CHEWABLE ORAL at 09:02

## 2018-05-22 RX ADMIN — TIMOLOL MALEATE 1 DROP: 5 SOLUTION/ DROPS OPHTHALMIC at 20:31

## 2018-05-22 RX ADMIN — LEVOTHYROXINE SODIUM 25 MCG: 25 TABLET ORAL at 09:00

## 2018-05-22 RX ADMIN — HYDRALAZINE HYDROCHLORIDE 100 MG: 50 TABLET, FILM COATED ORAL at 06:10

## 2018-05-22 RX ADMIN — ALPRAZOLAM 0.25 MG: 0.5 TABLET ORAL at 09:01

## 2018-05-22 RX ADMIN — HYDRALAZINE HYDROCHLORIDE 100 MG: 50 TABLET, FILM COATED ORAL at 21:07

## 2018-05-22 RX ADMIN — HYDRALAZINE HYDROCHLORIDE 100 MG: 50 TABLET, FILM COATED ORAL at 15:11

## 2018-05-22 RX ADMIN — PANTOPRAZOLE SODIUM 40 MG: 40 TABLET, DELAYED RELEASE ORAL at 09:00

## 2018-05-23 LAB
GLUCOSE BLDC GLUCOMTR-MCNC: 101 MG/DL (ref 70–130)
GLUCOSE BLDC GLUCOMTR-MCNC: 109 MG/DL (ref 70–130)
GLUCOSE BLDC GLUCOMTR-MCNC: 124 MG/DL (ref 70–130)
GLUCOSE BLDC GLUCOMTR-MCNC: 125 MG/DL (ref 70–130)

## 2018-05-23 PROCEDURE — 97110 THERAPEUTIC EXERCISES: CPT

## 2018-05-23 PROCEDURE — 82962 GLUCOSE BLOOD TEST: CPT

## 2018-05-23 RX ADMIN — TIMOLOL MALEATE 1 DROP: 5 SOLUTION/ DROPS OPHTHALMIC at 20:27

## 2018-05-23 RX ADMIN — VENLAFAXINE HYDROCHLORIDE 150 MG: 150 CAPSULE, EXTENDED RELEASE ORAL at 09:35

## 2018-05-23 RX ADMIN — HYDRALAZINE HYDROCHLORIDE 100 MG: 50 TABLET, FILM COATED ORAL at 17:50

## 2018-05-23 RX ADMIN — HYDRALAZINE HYDROCHLORIDE 100 MG: 50 TABLET, FILM COATED ORAL at 23:51

## 2018-05-23 RX ADMIN — HYDRALAZINE HYDROCHLORIDE 100 MG: 50 TABLET, FILM COATED ORAL at 09:35

## 2018-05-23 RX ADMIN — LEVOTHYROXINE SODIUM 25 MCG: 25 TABLET ORAL at 09:36

## 2018-05-23 RX ADMIN — NIFEDIPINE 60 MG: 60 TABLET, FILM COATED, EXTENDED RELEASE ORAL at 09:35

## 2018-05-23 RX ADMIN — ASPIRIN 81 MG: 81 TABLET, CHEWABLE ORAL at 09:35

## 2018-05-23 RX ADMIN — PANTOPRAZOLE SODIUM 40 MG: 40 TABLET, DELAYED RELEASE ORAL at 09:34

## 2018-05-23 RX ADMIN — ARIPIPRAZOLE 10 MG: 10 TABLET ORAL at 09:34

## 2018-05-23 RX ADMIN — TIMOLOL MALEATE 1 DROP: 5 SOLUTION/ DROPS OPHTHALMIC at 09:36

## 2018-05-23 RX ADMIN — LATANOPROST 1 DROP: 50 SOLUTION OPHTHALMIC at 20:27

## 2018-05-23 RX ADMIN — ALPRAZOLAM 0.25 MG: 0.5 TABLET ORAL at 09:35

## 2018-05-23 RX ADMIN — ALPRAZOLAM 0.25 MG: 0.5 TABLET ORAL at 20:27

## 2018-05-23 RX ADMIN — ALPRAZOLAM 0.25 MG: 0.5 TABLET ORAL at 17:50

## 2018-05-24 LAB
GLUCOSE BLDC GLUCOMTR-MCNC: 104 MG/DL (ref 70–130)
GLUCOSE BLDC GLUCOMTR-MCNC: 105 MG/DL (ref 70–130)
GLUCOSE BLDC GLUCOMTR-MCNC: 140 MG/DL (ref 70–130)

## 2018-05-24 PROCEDURE — 97110 THERAPEUTIC EXERCISES: CPT | Performed by: OCCUPATIONAL THERAPIST

## 2018-05-24 PROCEDURE — 82962 GLUCOSE BLOOD TEST: CPT

## 2018-05-24 RX ADMIN — HYDRALAZINE HYDROCHLORIDE 100 MG: 50 TABLET, FILM COATED ORAL at 16:46

## 2018-05-24 RX ADMIN — ALPRAZOLAM 0.25 MG: 0.5 TABLET ORAL at 16:46

## 2018-05-24 RX ADMIN — LATANOPROST 1 DROP: 50 SOLUTION OPHTHALMIC at 20:52

## 2018-05-24 RX ADMIN — ALPRAZOLAM 0.25 MG: 0.5 TABLET ORAL at 10:19

## 2018-05-24 RX ADMIN — VENLAFAXINE HYDROCHLORIDE 150 MG: 150 CAPSULE, EXTENDED RELEASE ORAL at 10:20

## 2018-05-24 RX ADMIN — NIFEDIPINE 60 MG: 60 TABLET, FILM COATED, EXTENDED RELEASE ORAL at 14:35

## 2018-05-24 RX ADMIN — HYDRALAZINE HYDROCHLORIDE 100 MG: 50 TABLET, FILM COATED ORAL at 22:00

## 2018-05-24 RX ADMIN — PANTOPRAZOLE SODIUM 40 MG: 40 TABLET, DELAYED RELEASE ORAL at 14:35

## 2018-05-24 RX ADMIN — ASPIRIN 81 MG: 81 TABLET, CHEWABLE ORAL at 14:35

## 2018-05-24 RX ADMIN — ALPRAZOLAM 0.25 MG: 0.5 TABLET ORAL at 20:51

## 2018-05-24 RX ADMIN — ARIPIPRAZOLE 10 MG: 10 TABLET ORAL at 10:19

## 2018-05-24 RX ADMIN — TIMOLOL MALEATE 1 DROP: 5 SOLUTION/ DROPS OPHTHALMIC at 20:52

## 2018-05-24 RX ADMIN — HYDRALAZINE HYDROCHLORIDE 100 MG: 50 TABLET, FILM COATED ORAL at 10:20

## 2018-05-24 RX ADMIN — TIMOLOL MALEATE 1 DROP: 5 SOLUTION/ DROPS OPHTHALMIC at 10:24

## 2018-05-25 LAB
ANION GAP SERPL CALCULATED.3IONS-SCNC: 10.3 MMOL/L
BUN BLD-MCNC: 24 MG/DL (ref 8–23)
BUN/CREAT SERPL: 24.2 (ref 7–25)
CALCIUM SPEC-SCNC: 9.2 MG/DL (ref 8.6–10.5)
CHLORIDE SERPL-SCNC: 96 MMOL/L (ref 98–107)
CO2 SERPL-SCNC: 25.7 MMOL/L (ref 22–29)
CREAT BLD-MCNC: 0.99 MG/DL (ref 0.57–1)
GFR SERPL CREATININE-BSD FRML MDRD: 53 ML/MIN/1.73
GLUCOSE BLD-MCNC: 106 MG/DL (ref 65–99)
POTASSIUM BLD-SCNC: 3.8 MMOL/L (ref 3.5–5.2)
SODIUM BLD-SCNC: 132 MMOL/L (ref 136–145)

## 2018-05-25 PROCEDURE — 97110 THERAPEUTIC EXERCISES: CPT

## 2018-05-25 PROCEDURE — 97535 SELF CARE MNGMENT TRAINING: CPT | Performed by: OCCUPATIONAL THERAPIST

## 2018-05-25 PROCEDURE — 80048 BASIC METABOLIC PNL TOTAL CA: CPT | Performed by: INTERNAL MEDICINE

## 2018-05-25 RX ORDER — METHYLPHENIDATE HYDROCHLORIDE 5 MG/1
5 TABLET ORAL 2 TIMES DAILY
Status: DISCONTINUED | OUTPATIENT
Start: 2018-05-25 | End: 2018-05-26

## 2018-05-25 RX ADMIN — LATANOPROST 1 DROP: 50 SOLUTION OPHTHALMIC at 20:00

## 2018-05-25 RX ADMIN — ALPRAZOLAM 0.25 MG: 0.5 TABLET ORAL at 16:11

## 2018-05-25 RX ADMIN — ARIPIPRAZOLE 10 MG: 10 TABLET ORAL at 09:05

## 2018-05-25 RX ADMIN — HYDRALAZINE HYDROCHLORIDE 100 MG: 50 TABLET, FILM COATED ORAL at 21:18

## 2018-05-25 RX ADMIN — PANTOPRAZOLE SODIUM 40 MG: 40 TABLET, DELAYED RELEASE ORAL at 09:03

## 2018-05-25 RX ADMIN — ALPRAZOLAM 0.25 MG: 0.5 TABLET ORAL at 09:04

## 2018-05-25 RX ADMIN — NIFEDIPINE 60 MG: 60 TABLET, FILM COATED, EXTENDED RELEASE ORAL at 09:03

## 2018-05-25 RX ADMIN — METHYLPHENIDATE HYDROCHLORIDE 5 MG: 5 TABLET ORAL at 20:01

## 2018-05-25 RX ADMIN — METHYLPHENIDATE HYDROCHLORIDE 5 MG: 5 TABLET ORAL at 11:03

## 2018-05-25 RX ADMIN — TIMOLOL MALEATE 1 DROP: 5 SOLUTION/ DROPS OPHTHALMIC at 09:05

## 2018-05-25 RX ADMIN — ASPIRIN 81 MG: 81 TABLET, CHEWABLE ORAL at 09:02

## 2018-05-25 RX ADMIN — VENLAFAXINE HYDROCHLORIDE 150 MG: 150 CAPSULE, EXTENDED RELEASE ORAL at 09:03

## 2018-05-25 RX ADMIN — HYDRALAZINE HYDROCHLORIDE 100 MG: 50 TABLET, FILM COATED ORAL at 09:01

## 2018-05-25 RX ADMIN — ALPRAZOLAM 0.25 MG: 0.5 TABLET ORAL at 20:00

## 2018-05-25 RX ADMIN — LEVOTHYROXINE SODIUM 25 MCG: 25 TABLET ORAL at 09:02

## 2018-05-26 LAB
ANION GAP SERPL CALCULATED.3IONS-SCNC: 12.8 MMOL/L
BUN BLD-MCNC: 24 MG/DL (ref 8–23)
BUN/CREAT SERPL: 25.8 (ref 7–25)
CALCIUM SPEC-SCNC: 9 MG/DL (ref 8.6–10.5)
CHLORIDE SERPL-SCNC: 96 MMOL/L (ref 98–107)
CO2 SERPL-SCNC: 25.2 MMOL/L (ref 22–29)
CREAT BLD-MCNC: 0.93 MG/DL (ref 0.57–1)
GFR SERPL CREATININE-BSD FRML MDRD: 57 ML/MIN/1.73
GLUCOSE BLD-MCNC: 94 MG/DL (ref 65–99)
GLUCOSE BLDC GLUCOMTR-MCNC: 108 MG/DL (ref 70–130)
POTASSIUM BLD-SCNC: 3.6 MMOL/L (ref 3.5–5.2)
SODIUM BLD-SCNC: 134 MMOL/L (ref 136–145)

## 2018-05-26 PROCEDURE — 82962 GLUCOSE BLOOD TEST: CPT

## 2018-05-26 PROCEDURE — 80048 BASIC METABOLIC PNL TOTAL CA: CPT | Performed by: INTERNAL MEDICINE

## 2018-05-26 RX ORDER — METHYLPHENIDATE HYDROCHLORIDE 5 MG/1
5 TABLET ORAL 2 TIMES DAILY
Status: DISCONTINUED | OUTPATIENT
Start: 2018-05-26 | End: 2018-05-27

## 2018-05-26 RX ORDER — METHYLPHENIDATE HYDROCHLORIDE 5 MG/1
5 TABLET ORAL ONCE
Status: DISCONTINUED | OUTPATIENT
Start: 2018-05-26 | End: 2018-05-26

## 2018-05-26 RX ADMIN — NIFEDIPINE 60 MG: 60 TABLET, FILM COATED, EXTENDED RELEASE ORAL at 08:40

## 2018-05-26 RX ADMIN — ALPRAZOLAM 0.25 MG: 0.5 TABLET ORAL at 20:34

## 2018-05-26 RX ADMIN — VENLAFAXINE HYDROCHLORIDE 150 MG: 150 CAPSULE, EXTENDED RELEASE ORAL at 07:49

## 2018-05-26 RX ADMIN — ARIPIPRAZOLE 10 MG: 10 TABLET ORAL at 08:41

## 2018-05-26 RX ADMIN — METHYLPHENIDATE HYDROCHLORIDE 5 MG: 5 TABLET ORAL at 13:48

## 2018-05-26 RX ADMIN — METHYLPHENIDATE HYDROCHLORIDE 5 MG: 5 TABLET ORAL at 08:39

## 2018-05-26 RX ADMIN — HYDRALAZINE HYDROCHLORIDE 100 MG: 50 TABLET, FILM COATED ORAL at 06:31

## 2018-05-26 RX ADMIN — HYDRALAZINE HYDROCHLORIDE 100 MG: 50 TABLET, FILM COATED ORAL at 22:22

## 2018-05-26 RX ADMIN — ASPIRIN 81 MG: 81 TABLET, CHEWABLE ORAL at 08:41

## 2018-05-26 RX ADMIN — ALPRAZOLAM 0.25 MG: 0.5 TABLET ORAL at 16:54

## 2018-05-26 RX ADMIN — ALPRAZOLAM 0.25 MG: 0.5 TABLET ORAL at 08:41

## 2018-05-26 RX ADMIN — PANTOPRAZOLE SODIUM 40 MG: 40 TABLET, DELAYED RELEASE ORAL at 07:49

## 2018-05-26 RX ADMIN — LATANOPROST 1 DROP: 50 SOLUTION OPHTHALMIC at 20:34

## 2018-05-26 RX ADMIN — LEVOTHYROXINE SODIUM 25 MCG: 25 TABLET ORAL at 07:49

## 2018-05-26 RX ADMIN — TIMOLOL MALEATE 1 DROP: 5 SOLUTION/ DROPS OPHTHALMIC at 08:41

## 2018-05-27 RX ORDER — METHYLPHENIDATE HYDROCHLORIDE 5 MG/1
10 TABLET ORAL 2 TIMES DAILY
Status: DISCONTINUED | OUTPATIENT
Start: 2018-05-28 | End: 2018-05-29 | Stop reason: HOSPADM

## 2018-05-27 RX ADMIN — TIMOLOL MALEATE 1 DROP: 5 SOLUTION/ DROPS OPHTHALMIC at 08:24

## 2018-05-27 RX ADMIN — HYDRALAZINE HYDROCHLORIDE 100 MG: 50 TABLET, FILM COATED ORAL at 21:04

## 2018-05-27 RX ADMIN — ASPIRIN 81 MG: 81 TABLET, CHEWABLE ORAL at 08:25

## 2018-05-27 RX ADMIN — ALPRAZOLAM 0.25 MG: 0.5 TABLET ORAL at 08:24

## 2018-05-27 RX ADMIN — ARIPIPRAZOLE 10 MG: 10 TABLET ORAL at 08:25

## 2018-05-27 RX ADMIN — ALPRAZOLAM 0.25 MG: 0.5 TABLET ORAL at 17:18

## 2018-05-27 RX ADMIN — LATANOPROST 1 DROP: 50 SOLUTION OPHTHALMIC at 20:16

## 2018-05-27 RX ADMIN — PANTOPRAZOLE SODIUM 40 MG: 40 TABLET, DELAYED RELEASE ORAL at 06:20

## 2018-05-27 RX ADMIN — HYDRALAZINE HYDROCHLORIDE 100 MG: 50 TABLET, FILM COATED ORAL at 06:20

## 2018-05-27 RX ADMIN — ALPRAZOLAM 0.25 MG: 0.5 TABLET ORAL at 20:16

## 2018-05-27 RX ADMIN — METHYLPHENIDATE HYDROCHLORIDE 5 MG: 5 TABLET ORAL at 12:21

## 2018-05-27 RX ADMIN — METHYLPHENIDATE HYDROCHLORIDE 5 MG: 5 TABLET ORAL at 07:37

## 2018-05-27 RX ADMIN — VENLAFAXINE HYDROCHLORIDE 150 MG: 150 CAPSULE, EXTENDED RELEASE ORAL at 07:37

## 2018-05-27 RX ADMIN — LEVOTHYROXINE SODIUM 25 MCG: 25 TABLET ORAL at 06:21

## 2018-05-27 RX ADMIN — HYDRALAZINE HYDROCHLORIDE 100 MG: 50 TABLET, FILM COATED ORAL at 14:36

## 2018-05-27 RX ADMIN — NIFEDIPINE 60 MG: 60 TABLET, FILM COATED, EXTENDED RELEASE ORAL at 08:25

## 2018-05-28 LAB
ANION GAP SERPL CALCULATED.3IONS-SCNC: 11 MMOL/L
BUN BLD-MCNC: 19 MG/DL (ref 8–23)
BUN/CREAT SERPL: 22.9 (ref 7–25)
CALCIUM SPEC-SCNC: 9.1 MG/DL (ref 8.6–10.5)
CHLORIDE SERPL-SCNC: 97 MMOL/L (ref 98–107)
CO2 SERPL-SCNC: 25 MMOL/L (ref 22–29)
CREAT BLD-MCNC: 0.83 MG/DL (ref 0.57–1)
GFR SERPL CREATININE-BSD FRML MDRD: 65 ML/MIN/1.73
GLUCOSE BLD-MCNC: 89 MG/DL (ref 65–99)
POTASSIUM BLD-SCNC: 3.4 MMOL/L (ref 3.5–5.2)
SODIUM BLD-SCNC: 133 MMOL/L (ref 136–145)

## 2018-05-28 PROCEDURE — 80048 BASIC METABOLIC PNL TOTAL CA: CPT | Performed by: INTERNAL MEDICINE

## 2018-05-28 RX ORDER — POTASSIUM CHLORIDE 750 MG/1
20 CAPSULE, EXTENDED RELEASE ORAL ONCE
Status: COMPLETED | OUTPATIENT
Start: 2018-05-28 | End: 2018-05-28

## 2018-05-28 RX ADMIN — LEVOTHYROXINE SODIUM 25 MCG: 25 TABLET ORAL at 05:45

## 2018-05-28 RX ADMIN — POTASSIUM CHLORIDE 20 MEQ: 750 CAPSULE, EXTENDED RELEASE ORAL at 11:00

## 2018-05-28 RX ADMIN — HYDRALAZINE HYDROCHLORIDE 100 MG: 50 TABLET, FILM COATED ORAL at 05:44

## 2018-05-28 RX ADMIN — ALPRAZOLAM 0.25 MG: 0.5 TABLET ORAL at 20:54

## 2018-05-28 RX ADMIN — ASPIRIN 81 MG: 81 TABLET, CHEWABLE ORAL at 09:24

## 2018-05-28 RX ADMIN — HYDRALAZINE HYDROCHLORIDE 100 MG: 50 TABLET, FILM COATED ORAL at 16:33

## 2018-05-28 RX ADMIN — ARIPIPRAZOLE 10 MG: 10 TABLET ORAL at 09:24

## 2018-05-28 RX ADMIN — METHYLPHENIDATE HYDROCHLORIDE 10 MG: 5 TABLET ORAL at 09:24

## 2018-05-28 RX ADMIN — PANTOPRAZOLE SODIUM 40 MG: 40 TABLET, DELAYED RELEASE ORAL at 05:44

## 2018-05-28 RX ADMIN — VENLAFAXINE HYDROCHLORIDE 150 MG: 150 CAPSULE, EXTENDED RELEASE ORAL at 09:24

## 2018-05-28 RX ADMIN — LATANOPROST 1 DROP: 50 SOLUTION OPHTHALMIC at 20:52

## 2018-05-28 RX ADMIN — HYDRALAZINE HYDROCHLORIDE 100 MG: 50 TABLET, FILM COATED ORAL at 21:20

## 2018-05-28 RX ADMIN — ALPRAZOLAM 0.25 MG: 0.5 TABLET ORAL at 09:23

## 2018-05-28 RX ADMIN — NIFEDIPINE 60 MG: 60 TABLET, FILM COATED, EXTENDED RELEASE ORAL at 09:24

## 2018-05-28 RX ADMIN — TIMOLOL MALEATE 1 DROP: 5 SOLUTION/ DROPS OPHTHALMIC at 09:25

## 2018-05-28 RX ADMIN — METHYLPHENIDATE HYDROCHLORIDE 10 MG: 5 TABLET ORAL at 16:33

## 2018-05-28 RX ADMIN — ALPRAZOLAM 0.25 MG: 0.5 TABLET ORAL at 16:34

## 2018-05-29 VITALS
DIASTOLIC BLOOD PRESSURE: 79 MMHG | HEART RATE: 89 BPM | SYSTOLIC BLOOD PRESSURE: 133 MMHG | OXYGEN SATURATION: 97 % | TEMPERATURE: 97.3 F | HEIGHT: 62 IN | RESPIRATION RATE: 18 BRPM

## 2018-05-29 LAB
ANION GAP SERPL CALCULATED.3IONS-SCNC: 12.6 MMOL/L
BUN BLD-MCNC: 24 MG/DL (ref 8–23)
BUN/CREAT SERPL: 23.8 (ref 7–25)
CALCIUM SPEC-SCNC: 9 MG/DL (ref 8.6–10.5)
CHLORIDE SERPL-SCNC: 97 MMOL/L (ref 98–107)
CO2 SERPL-SCNC: 23.4 MMOL/L (ref 22–29)
CREAT BLD-MCNC: 1.01 MG/DL (ref 0.57–1)
GFR SERPL CREATININE-BSD FRML MDRD: 52 ML/MIN/1.73
GLUCOSE BLD-MCNC: 106 MG/DL (ref 65–99)
MAGNESIUM SERPL-MCNC: 2 MG/DL (ref 1.6–2.4)
POTASSIUM BLD-SCNC: 3.8 MMOL/L (ref 3.5–5.2)
SODIUM BLD-SCNC: 133 MMOL/L (ref 136–145)

## 2018-05-29 PROCEDURE — 97110 THERAPEUTIC EXERCISES: CPT

## 2018-05-29 PROCEDURE — 97535 SELF CARE MNGMENT TRAINING: CPT

## 2018-05-29 PROCEDURE — 80048 BASIC METABOLIC PNL TOTAL CA: CPT | Performed by: INTERNAL MEDICINE

## 2018-05-29 PROCEDURE — 83735 ASSAY OF MAGNESIUM: CPT | Performed by: INTERNAL MEDICINE

## 2018-05-29 RX ORDER — METHYLPHENIDATE HYDROCHLORIDE 10 MG/1
10 TABLET ORAL 2 TIMES DAILY
Qty: 60 TABLET | Refills: 0 | Status: SHIPPED | OUTPATIENT
Start: 2018-05-29 | End: 2018-06-03

## 2018-05-29 RX ORDER — ARIPIPRAZOLE 10 MG/1
10 TABLET ORAL DAILY
Qty: 30 TABLET | Refills: 0 | Status: SHIPPED | OUTPATIENT
Start: 2018-05-30

## 2018-05-29 RX ORDER — NIFEDIPINE 60 MG/1
60 TABLET, FILM COATED, EXTENDED RELEASE ORAL
Qty: 30 TABLET | Refills: 0 | Status: SHIPPED | OUTPATIENT
Start: 2018-05-30

## 2018-05-29 RX ORDER — ALPRAZOLAM 0.25 MG/1
0.25 TABLET ORAL 3 TIMES DAILY
Qty: 90 TABLET | Refills: 0 | Status: SHIPPED | OUTPATIENT
Start: 2018-05-29 | End: 2018-06-02

## 2018-05-29 RX ORDER — VENLAFAXINE HYDROCHLORIDE 150 MG/1
150 CAPSULE, EXTENDED RELEASE ORAL
Qty: 30 CAPSULE | Refills: 1 | Status: SHIPPED | OUTPATIENT
Start: 2018-05-30

## 2018-05-29 RX ORDER — HYDRALAZINE HYDROCHLORIDE 100 MG/1
100 TABLET, FILM COATED ORAL 3 TIMES DAILY
Qty: 90 TABLET | Refills: 0 | Status: SHIPPED | OUTPATIENT
Start: 2018-05-29

## 2018-05-29 RX ADMIN — PANTOPRAZOLE SODIUM 40 MG: 40 TABLET, DELAYED RELEASE ORAL at 06:26

## 2018-05-29 RX ADMIN — HYDRALAZINE HYDROCHLORIDE 100 MG: 50 TABLET, FILM COATED ORAL at 06:26

## 2018-05-29 RX ADMIN — ASPIRIN 81 MG: 81 TABLET, CHEWABLE ORAL at 08:57

## 2018-05-29 RX ADMIN — NIFEDIPINE 60 MG: 60 TABLET, FILM COATED, EXTENDED RELEASE ORAL at 08:57

## 2018-05-29 RX ADMIN — METHYLPHENIDATE HYDROCHLORIDE 10 MG: 5 TABLET ORAL at 08:57

## 2018-05-29 RX ADMIN — VENLAFAXINE HYDROCHLORIDE 150 MG: 150 CAPSULE, EXTENDED RELEASE ORAL at 08:58

## 2018-05-29 RX ADMIN — ARIPIPRAZOLE 10 MG: 10 TABLET ORAL at 08:57

## 2018-05-29 RX ADMIN — ALPRAZOLAM 0.25 MG: 0.5 TABLET ORAL at 08:57

## 2018-05-29 RX ADMIN — LEVOTHYROXINE SODIUM 25 MCG: 25 TABLET ORAL at 06:26

## 2020-03-11 NOTE — SIGNIFICANT NOTE
05/03/18 1028   Rehab Time/Intention   Evaluation Not Performed other (see comments)  (Pt on regular diet and thins. Pt non verbal and closing mouth to all PO presentations from SLP and patient's son. SLP will continue to follow as indicated. )   Rehab Treatment   Discipline speech language pathologist   Recommendations   SLP - Next Appointment (PRN pending pt acceptance of PO)      Dysuria

## 2022-08-04 NOTE — NURSING NOTE
Sukhwinder Suero for admitting orders. Awaiting phone call.    JONI Briceno    Complex Repair And M Plasty Text: The defect edges were debeveled with a #15 scalpel blade.  The primary defect was closed partially with a complex linear closure.  Given the location of the remaining defect, shape of the defect and the proximity to free margins an M plasty was deemed most appropriate for complete closure of the defect.  Using a sterile surgical marker, an appropriate advancement flap was drawn incorporating the defect and placing the expected incisions within the relaxed skin tension lines where possible.    The area thus outlined was incised deep to adipose tissue with a #15 scalpel blade.  The skin margins were undermined to an appropriate distance in all directions utilizing iris scissors.